# Patient Record
Sex: MALE | Race: WHITE | URBAN - METROPOLITAN AREA
[De-identification: names, ages, dates, MRNs, and addresses within clinical notes are randomized per-mention and may not be internally consistent; named-entity substitution may affect disease eponyms.]

---

## 2017-09-28 ENCOUNTER — TRANSFERRED RECORDS (OUTPATIENT)
Dept: HEALTH INFORMATION MANAGEMENT | Facility: CLINIC | Age: 82
End: 2017-09-28

## 2018-01-29 ENCOUNTER — TRANSFERRED RECORDS (OUTPATIENT)
Dept: HEALTH INFORMATION MANAGEMENT | Facility: CLINIC | Age: 83
End: 2018-01-29

## 2018-02-02 ENCOUNTER — TELEPHONE (OUTPATIENT)
Dept: SURGERY | Facility: CLINIC | Age: 83
End: 2018-02-02

## 2018-02-02 NOTE — TELEPHONE ENCOUNTER
Received referral from [facility] for newly diagnosed colon cancer.  Called and spoke with patient's daughter (per referral request).  Patient is scheduled 2/9/18 at 4:30 pm.  Provided address and check-in location.  [place] [facility].  Contacted our film room to link images, and left a message.

## 2018-02-09 ENCOUNTER — OFFICE VISIT (OUTPATIENT)
Dept: SURGERY | Facility: CLINIC | Age: 83
End: 2018-02-09
Payer: MEDICARE

## 2018-02-09 VITALS
WEIGHT: 199.4 LBS | DIASTOLIC BLOOD PRESSURE: 46 MMHG | BODY MASS INDEX: 31.3 KG/M2 | HEART RATE: 65 BPM | TEMPERATURE: 98.6 F | OXYGEN SATURATION: 96 % | HEIGHT: 67 IN | SYSTOLIC BLOOD PRESSURE: 114 MMHG

## 2018-02-09 DIAGNOSIS — I73.9 PVD (PERIPHERAL VASCULAR DISEASE) (H): ICD-10-CM

## 2018-02-09 DIAGNOSIS — C18.2 MALIGNANT NEOPLASM OF ASCENDING COLON (H): Primary | ICD-10-CM

## 2018-02-09 RX ORDER — CITALOPRAM HYDROBROMIDE 40 MG/1
40 TABLET ORAL EVERY MORNING
COMMUNITY
Start: 2016-07-13

## 2018-02-09 RX ORDER — SIMVASTATIN 20 MG
20 TABLET ORAL AT BEDTIME
COMMUNITY
Start: 2016-07-27

## 2018-02-09 RX ORDER — LISINOPRIL AND HYDROCHLOROTHIAZIDE 12.5; 2 MG/1; MG/1
TABLET ORAL
Status: ON HOLD | COMMUNITY
Start: 2015-09-09 | End: 2018-03-30

## 2018-02-09 RX ORDER — ASPIRIN 325 MG
325 TABLET ORAL EVERY MORNING
Status: ON HOLD | COMMUNITY
Start: 2017-04-10 | End: 2018-03-23

## 2018-02-09 RX ORDER — GABAPENTIN 300 MG/1
300 CAPSULE ORAL EVERY MORNING
COMMUNITY
Start: 2016-08-12

## 2018-02-09 ASSESSMENT — ENCOUNTER SYMPTOMS
BRUISES/BLEEDS EASILY: 0
BOWEL INCONTINENCE: 0
ABDOMINAL PAIN: 0
BLOATING: 0
NAUSEA: 0
RECTAL PAIN: 0
DIARRHEA: 0
EXERCISE INTOLERANCE: 1
CONSTIPATION: 0
PALPITATIONS: 0
VOMITING: 0
HYPOTENSION: 0
LIGHT-HEADEDNESS: 1
SWOLLEN GLANDS: 0
BLOOD IN STOOL: 0
HYPERTENSION: 0
SLEEP DISTURBANCES DUE TO BREATHING: 0
LEG PAIN: 1
SYNCOPE: 0
HEARTBURN: 0
JAUNDICE: 0
ORTHOPNEA: 0

## 2018-02-09 ASSESSMENT — PAIN SCALES - GENERAL: PAINLEVEL: NO PAIN (0)

## 2018-02-09 NOTE — MR AVS SNAPSHOT
After Visit Summary   2/9/2018    Pranav Jackson    MRN: [id number]           Patient Information     Date Of Birth          11/28/1934        Visit Information        Provider Department      2/9/2018 4:30 PM Brandin Ibarra MD [facility] Colon and Rectal Surgery        Today's Diagnoses     Malignant neoplasm of ascending colon (H)    -  1    PVD (peripheral vascular disease) (H)           Follow-ups after your visit        Additional Services     PAC Visit Referral (For [facility] Only)                 Your next 10 appointments already scheduled     Mar 07, 2018 11:40 AM CST   (Arrive by 11:25 AM)   CT CHEST W/O & W CONTRAST with UCCT2   [facility] Imaging Center CT ([facility])    [address]  [facility] [address] [phone]           Please bring any scans or X-rays taken at other hospitals, if similar tests were done. Also bring a list of your medicines, including vitamins, minerals and over-the-counter drugs. It is safest to leave personal items at home.  Be sure to tell your doctor:   If you have any allergies.   If there s any chance you are pregnant.   If you are breastfeeding.    If you have diabetes as your medication may need to be adjusted for this exam.  You will have contrast for this exam. To prepare:   Do not eat or drink for 2 hours before your exam. If you need to take medicine, you may take it with small sips of water. (We may ask you to take liquid medicine as well.)   The day before your exam, drink extra fluids at least six 8-ounce glasses (unless your doctor tells you to restrict your fluids).  Patients over 70 or patients with diabetes or kidney problems:   If you haven t had a blood test (creatinine test) within the last 30 days, the Cardiologist/Radiologist may require you to get this test prior to your exam.  Please wear loose clothing, such as a sweat suit or jogging clothes. Avoid snaps, zippers and other metal. We may ask you to  undress and put on a hospital gown.  If you have any questions, please call the Imaging Department where you will have your exam.            Mar 07, 2018  1:00 PM CST   (Arrive by 12:45 PM)   PAC EVALUATION with Torrey Pac Lilia 1   [facility] Preoperative Assessment [place] ([facility])    [address] [place] [id number]   [phone]            Mar 07, 2018  2:00 PM CST   (Arrive by 1:45 PM)   PAC RN ASSESSMENT with Torrey Pac Rn   [facility] Preoperative Assessment [place] ([facility])    [address] [place] [id number]   [phone]            Mar 07, 2018  2:30 PM CST   (Arrive by 2:15 PM)   PAC Anesthesia Consult with Torrey Pac Anesthesiologist   [facility] Assessment [place] ([facility])    [address] [place] [address]   [phone]              Future tests that were ordered for you today     Open Future Orders        Priority Expected Expires Ordered    CT Chest w & w/o contrast Routine  2/9/2019 2/9/2018    US Carotid Bilateral Routine  2/9/2019 2/9/2018            Who to contact     Please call your clinic at [phone] to:    Ask questions about your health    Make or cancel appointments    Discuss your medicines    Learn about your test results    Speak to your doctor            Additional Information About Your Visit        MyChart Information     [employer] is an electronic gateway that provides easy, online access to your medical records. With [email or web address], you can request a clinic appointment, read your test results, renew a prescription or communicate with your care team.     To sign up for [employer] visit the website at [email or web address]   You will be asked to enter the access code listed below, as well as some personal information. Please follow the directions to create your username and password.     Your access code is: [email or web address]  Expires:  "2018  6:30 AM     Your access code will  in 90 days. If you need help or a new code, please contact your [facility] or call [phone] for assistance.        Care EveryWhere ID     This is your Care EveryWhere ID. This could be used by other organizations to access your [place] medical records  [id number]        Your Vitals Were     Pulse Temperature Height Pulse Oximetry BMI (Body Mass Index)       65 98.6  F (37  C) (Oral) 5' 7\" 96% 31.23 kg/m2        Blood Pressure from Last 3 Encounters:   18 114/46    Weight from Last 3 Encounters:   18 199 lb 6.4 oz              We Performed the Following     PAC Visit Referral (For [facility] Only)     Melony-Operative Worksheet        Primary Care Provider Office Phone # Fax #    Beto WILKS Josiah,  [phone]       [facility] [address]   [facility] [address]        Equal Access to Services     SAGE ROBLEDO : Hadii aad ku hadasho Soomaali, waaxda luqadaha, qaybta kaalmada adeegyada, waxay idiin hayaan adeeg kharash la'aan ah. So [facility] [phone].    ATENCIÓN: Si habla español, tiene a stoner disposición servicios gratuitos de asistencia lingüística. Kathy al [phone].    We comply with applicable federal civil rights laws and [place] laws. We do not discriminate on the basis of race, color, national origin, age, disability, sex, sexual orientation, or gender identity.            Thank you!     Thank you for choosing [facility] COLON AND RECTAL SURGERY  for your care. Our goal is always to provide you with excellent care. Hearing back from our patients is one way we can continue to improve our services. Please take a few minutes to complete the written survey that you may receive in the mail after your visit with us. Thank you!             Your Updated Medication List - Protect others around you: Learn how to safely use, store and throw away your medicines at www.disposemymeds.org.        "   This list is accurate as of 2/9/18  6:03 PM.  Always use your most recent med list.                   Brand Name Dispense Instructions for use Diagnosis    aspirin 325 MG tablet      Take 325 mg by mouth        citalopram 40 MG tablet    celeXA     Take 40 mg by mouth        gabapentin 300 MG capsule    NEURONTIN     Take 300 mg by mouth        IRON SUPPLEMENT PO           lisinopril-hydrochlorothiazide 20-12.5 MG per tablet    PRINZIDE/ZESTORETIC     TAKE ONE TABLET BY MOUTH ONCE DAILY        metFORMIN 1000 MG tablet    GLUCOPHAGE     Take 1,000 mg by mouth        MULTIVITAMIN ADULT PO           omeprazole 20 MG CR capsule    priLOSEC     Take 20 mg by mouth        simvastatin 20 MG tablet    ZOCOR     Take 20 mg by mouth        [facility]

## 2018-02-09 NOTE — LETTER
"2018       RE: Pranav Jackson  [address]    [place] [address]     Dear Colleague,    Thank you for referring your patient, Pranav Jackson, to the [facility] COLON AND RECTAL SURGERY at [facility]. Please see a copy of my visit note below.    Colon and Rectal Surgery Clinic Note      RE: Pranav Jackson  : 1934  SANDRA: 2018    Pranav Jackson is a very pleasant 83-year-old male who presents today for ascending colon cancer.    Pranav presents today with his daughter Sherri, son Ozzy, and son-in-law Dale.  He underwent a colonoscopy on 2018 for anemia and black stools.  He was found to have a tumor in the ascending colon that covered at least 1/4-1/3 of the circumference of the bowel.  This was tattooed.  Biopsy showed invasive colorectal adenocarcinoma, moderately differentiated, with focal mucin production.  CT abdomen pelvis with no evidence of metastatic disease.  CEA 0.9.  Hemoglobin 10.4.  Pranav reports that he has lost 12-14 pounds recently but also changed his diet as his diabetes was poorly managed.  He denies any nausea or vomiting.  He denies any abdominal pain.  He has been having black stools.  He has reportedly had polyps on colonoscopies in the past.  He denies any family history of any colon cancer.    PMH: Diabetic on trulicity.  HTN. ASPVD. He reportedly was told that his aorta is \"like a rock\" and he was unable to have an abdominal bypass so he underwent a femoral to femoral bypass.  The patient recently underwent evaluation for orthopedic surgery and had an exercise stress test within the last 3 months.  This apparently was normal.  He also is known to have some degree of carotid stenosis but he had a carotid ultrasound 3 months ago that did not preclude surgery    PSH: Orthopedic surgeries and femoral bypass.  No prior history of anesthesia complications.    Social: Pranav lives independently in an apartment.  He quit smoking 15 years ago.  " He is a retired .  Moderate alcohol use.     PHYSICAL EXAM: Examination was chaperoned by Yakelin Cisneros RN.  The abdomen is soft.  There is a well-healed midline laparotomy scar.  There are no palpable masses.  The liver is not enlarged.  There is minimal tenderness in the left lower quadrant.    IMAGING: I personally reviewed the patient's abdominal pelvis CT.  I cannot clearly delineate the mass, described in the report at the hepatic flexure.  There appears to be bowel wall thickening and mild stranding in the left lower quadrant consistent with diverticulitis.  The patient has not been acutely ill with diverticulitis, but he allows he has had some left lower quadrant discomfort around the time of his colonoscopy.    PLAN: We will obtain a chest CT to complete the metastatic workup. Will obtain cardiac stress test and carotid ultrasound from [place], [place].  The patient will need to be seen in the preanesthesia clinic prior to surgery.  We offered the patient a surgery date in about 10 days, but due to family considerations he would like to put the surgery off for an additional month.  I advised that there was probably some small risk of tumor spread during that interval but it would be reasonable to proceed according to their wishes.  We discussed the medical and surgical risks associated with colectomy.  I plan a laparoscopic approach.  Our discussion included but was not limited to bleeding, infection, DVT/PE, pneumonia, MI/CVA, anastomotic failure and its potential consequences, and death.  We discussed his increased risk of cardiovascular morbidity based on his significant peripheral vascular disease.  I answered all of Pranav and his family's questions to their stated satisfaction.  They expressed her understanding and agreement with the proposed plan.    Total time 45 minutes.  Greater than half the time was spent counseling.    For details of past medical history, surgical history, family  "history, medications, allergies, and review of systems, please see details below.    Medical history:  No past medical history on file.    Surgical history:  No past surgical history on file.    Family history:  No family history on file.    Medications:  Current Outpatient Prescriptions   Medication Sig Dispense Refill     aspirin 325 MG tablet Take 325 mg by mouth       Multiple Vitamins-Minerals (MULTIVITAMIN ADULT PO)        gabapentin (NEURONTIN) 300 MG capsule Take 300 mg by mouth       omeprazole (PRILOSEC) 20 MG CR capsule Take 20 mg by mouth       lisinopril-hydrochlorothiazide (PRINZIDE/ZESTORETIC) 20-12.5 MG per tablet TAKE ONE TABLET BY MOUTH ONCE DAILY       metFORMIN (GLUCOPHAGE) 1000 MG tablet Take 1,000 mg by mouth       simvastatin (ZOCOR) 20 MG tablet Take 20 mg by mouth       citalopram (CELEXA) 40 MG tablet Take 40 mg by mouth       Ferrous Sulfate (IRON SUPPLEMENT PO)        Dulaglutide (TRULICITY SC)        Allergies:  The patientis allergic to penicillins.    Social history:  Social History   Substance Use Topics     Smoking status: Former Smoker     Smokeless tobacco: Never Used     Alcohol use Yes     Marital status: .    Review of Systems:  Nursing Notes:   Sanjana Marcus LPN  2/9/2018  4:01 PM  Signed  Chief Complaint   Patient presents with     Clinic Care Coordination - Initial     New pt consult, colon ca.        Vitals:    02/09/18 1553   BP: 114/46   BP Location: Left arm   Patient Position: Chair   Cuff Size: Adult Large   Pulse: 65   Temp: 98.6  F (37  C)   TempSrc: Oral   SpO2: 96%   Weight: 199 lb 6.4 oz   Height: 5' 7\"       Body mass index is 31.23 kg/(m^2).    Jordi HUITRON LPN     Referring Provider:  Beto Rahman DO  [facility]  [address] [place] [address]     Primary Care Provider:  Beto Rahman    Again, thank you for allowing me to participate in the care of your patient.      Sincerely,    Brandin Ibarra MD      "

## 2018-02-09 NOTE — PROGRESS NOTES
"Colon and Rectal Surgery Clinic Note      RE: Pranav Jackson  : 1934  SANDRA: 2018    Pranav Jackson is a very pleasant 83-year-old male who presents today for ascending colon cancer.    Pranav presents today with his daughter Sherri, son Ozzy, and son-in-law Dale.  He underwent a colonoscopy on 2018 for anemia and black stools.  He was found to have a tumor in the ascending colon that covered at least 1/4-1/3 of the circumference of the bowel.  This was tattooed.  Biopsy showed invasive colorectal adenocarcinoma, moderately differentiated, with focal mucin production.  CT abdomen pelvis with no evidence of metastatic disease.  CEA 0.9.  Hemoglobin 10.4.  Pranav reports that he has lost 12-14 pounds recently but also changed his diet as his diabetes was poorly managed.  He denies any nausea or vomiting.  He denies any abdominal pain.  He has been having black stools.  He has reportedly had polyps on colonoscopies in the past.  He denies any family history of any colon cancer.    PMH: Diabetic on trulicity.  HTN. ASPVD. He reportedly was told that his aorta is \"like a rock\" and he was unable to have an abdominal bypass so he underwent a femoral to femoral bypass.  The patient recently underwent evaluation for orthopedic surgery and had an exercise stress test within the last 3 months.  This apparently was normal.  He also is known to have some degree of carotid stenosis but he had a carotid ultrasound 3 months ago that did not preclude surgery    PSH: Orthopedic surgeries and femoral bypass.  No prior history of anesthesia complications.    Social: Pranav lives independently in an apartment.  He quit smoking 15 years ago.  He is a retired .  Moderate alcohol use.     PHYSICAL EXAM: Examination was chaperoned by Yakelin Cisneros RN.  The abdomen is soft.  There is a well-healed midline laparotomy scar.  There are no palpable masses.  The liver is not enlarged.  There is minimal tenderness in the " left lower quadrant.    IMAGING: I personally reviewed the patient's abdominal pelvis CT.  I cannot clearly delineate the mass, described in the report at the hepatic flexure.  There appears to be bowel wall thickening and mild stranding in the left lower quadrant consistent with diverticulitis.  The patient has not been acutely ill with diverticulitis, but he allows he has had some left lower quadrant discomfort around the time of his colonoscopy.    PLAN: We will obtain a chest CT to complete the metastatic workup. Will obtain cardiac stress test and carotid ultrasound from [place], [place].  The patient will need to be seen in the preanesthesia clinic prior to surgery.  We offered the patient a surgery date in about 10 days, but due to family considerations he would like to put the surgery off for an additional month.  I advised that there was probably some small risk of tumor spread during that interval but it would be reasonable to proceed according to their wishes.  We discussed the medical and surgical risks associated with colectomy.  I plan a laparoscopic approach.  Our discussion included but was not limited to bleeding, infection, DVT/PE, pneumonia, MI/CVA, anastomotic failure and its potential consequences, and death.  We discussed his increased risk of cardiovascular morbidity based on his significant peripheral vascular disease.  I answered all of Pranav and his family's questions to their stated satisfaction.  They expressed her understanding and agreement with the proposed plan.    Total time 45 minutes.  Greater than half the time was spent counseling.    For details of past medical history, surgical history, family history, medications, allergies, and review of systems, please see details below.    Medical history:  No past medical history on file.    Surgical history:  No past surgical history on file.    Family history:  No family history on file.    Medications:  Current Outpatient Prescriptions  "  Medication Sig Dispense Refill     aspirin 325 MG tablet Take 325 mg by mouth       Multiple Vitamins-Minerals (MULTIVITAMIN ADULT PO)        gabapentin (NEURONTIN) 300 MG capsule Take 300 mg by mouth       omeprazole (PRILOSEC) 20 MG CR capsule Take 20 mg by mouth       lisinopril-hydrochlorothiazide (PRINZIDE/ZESTORETIC) 20-12.5 MG per tablet TAKE ONE TABLET BY MOUTH ONCE DAILY       metFORMIN (GLUCOPHAGE) 1000 MG tablet Take 1,000 mg by mouth       simvastatin (ZOCOR) 20 MG tablet Take 20 mg by mouth       citalopram (CELEXA) 40 MG tablet Take 40 mg by mouth       Ferrous Sulfate (IRON SUPPLEMENT PO)        Dulaglutide (TRULICITY SC)        Allergies:  The patientis allergic to penicillins.    Social history:  Social History   Substance Use Topics     Smoking status: Former Smoker     Smokeless tobacco: Never Used     Alcohol use Yes     Marital status: .    Review of Systems:  Nursing Notes:   Sanjana Marcus LPN  2/9/2018  4:01 PM  Signed  Chief Complaint   Patient presents with     Clinic Care Coordination - Initial     New pt consult, colon ca.        Vitals:    02/09/18 1553   BP: 114/46   BP Location: Left arm   Patient Position: Chair   Cuff Size: Adult Large   Pulse: 65   Temp: 98.6  F (37  C)   TempSrc: Oral   SpO2: 96%   Weight: 199 lb 6.4 oz   Height: 5' 7\"       Body mass index is 31.23 kg/(m^2).    JULIÁN Pierce MD   Professor and Chief  Division of Colon and Rectal Surgery  [facility] [place]      Referring Provider:  Beto Rahman [facility]  [address] [place] [address]     Primary Care Provider:  Beto Rahman  "

## 2018-02-20 ENCOUNTER — TELEPHONE (OUTPATIENT)
Dept: SURGERY | Facility: CLINIC | Age: 83
End: 2018-02-20

## 2018-02-20 DIAGNOSIS — C18.9 COLON CANCER (H): Primary | ICD-10-CM

## 2018-02-20 NOTE — TELEPHONE ENCOUNTER
Called patient daughter and left message stating that she needed to call this RN and to discuss fathers plan of care. Direct number was left for patient's daughter ness.

## 2018-02-21 ENCOUNTER — TELEPHONE (OUTPATIENT)
Dept: SURGERY | Facility: CLINIC | Age: 83
End: 2018-02-21

## 2018-02-21 NOTE — TELEPHONE ENCOUNTER
Sherri, the patients daughter returned this RN phone call today. This RN explained to the daughter that the carotid  artery studies that were obtained from [facility] showed blockage in his carotid arteries. She was informed that before Dr. Ibarra performs surgery on the patient he would like him to have an office visit with the vascular surgeon Dr. Walters to determine if it  is safe for him to undergo an abdominal surgery. Daughter verbalized understanding and will await for Renita Beauchamp to call her with a date and time of an office visit. The daughter Sherri, gave other contact numbers to be reached during the day, these will be listed below.    Sherri:  [phone]  Cell [phone]

## 2018-02-27 ENCOUNTER — TELEPHONE (OUTPATIENT)
Dept: SURGERY | Facility: CLINIC | Age: 83
End: 2018-02-27

## 2018-02-27 NOTE — TELEPHONE ENCOUNTER
Patient is scheduled to see Dr. Walters this Thursday 3/1/18 at 1:15 pm.  Called and spoke with patient's daughter Sherri.  Sherri confirms appointment and check-in location.  Offered to reschedule PAC and CT for same day.  Sherri kindly declined as there would be a larger wait time between PAC and other appointments.  Sherri has my direct number for additional questions or concerns.

## 2018-03-01 ENCOUNTER — RADIANT APPOINTMENT (OUTPATIENT)
Dept: ULTRASOUND IMAGING | Facility: CLINIC | Age: 83
End: 2018-03-01
Attending: COLON & RECTAL SURGERY
Payer: MEDICARE

## 2018-03-01 ENCOUNTER — RADIANT APPOINTMENT (OUTPATIENT)
Dept: CT IMAGING | Facility: CLINIC | Age: 83
End: 2018-03-01
Attending: SURGERY
Payer: MEDICARE

## 2018-03-01 ENCOUNTER — OFFICE VISIT (OUTPATIENT)
Dept: VASCULAR SURGERY | Facility: CLINIC | Age: 83
End: 2018-03-01
Payer: MEDICARE

## 2018-03-01 ENCOUNTER — CARE COORDINATION (OUTPATIENT)
Dept: VASCULAR SURGERY | Facility: CLINIC | Age: 83
End: 2018-03-01

## 2018-03-01 VITALS
HEART RATE: 57 BPM | SYSTOLIC BLOOD PRESSURE: 122 MMHG | RESPIRATION RATE: 16 BRPM | DIASTOLIC BLOOD PRESSURE: 58 MMHG | OXYGEN SATURATION: 97 %

## 2018-03-01 DIAGNOSIS — I65.23 CAROTID STENOSIS, ASYMPTOMATIC, BILATERAL: ICD-10-CM

## 2018-03-01 DIAGNOSIS — I65.23 CAROTID STENOSIS, ASYMPTOMATIC, BILATERAL: Primary | ICD-10-CM

## 2018-03-01 DIAGNOSIS — I65.23 BILATERAL CAROTID ARTERY STENOSIS WITHOUT CEREBRAL INFARCTION: Primary | ICD-10-CM

## 2018-03-01 DIAGNOSIS — C18.2 MALIGNANT NEOPLASM OF ASCENDING COLON (H): ICD-10-CM

## 2018-03-01 LAB
CREAT BLD-MCNC: 1.1 MG/DL (ref 0.66–1.25)
GFR SERPL CREATININE-BSD FRML MDRD: 64 ML/MIN/1.7M2

## 2018-03-01 RX ORDER — IOPAMIDOL 755 MG/ML
75 INJECTION, SOLUTION INTRAVASCULAR ONCE
Status: COMPLETED | OUTPATIENT
Start: 2018-03-01 | End: 2018-03-01

## 2018-03-01 RX ADMIN — IOPAMIDOL 75 ML: 755 INJECTION, SOLUTION INTRAVASCULAR at 12:41

## 2018-03-01 ASSESSMENT — PAIN SCALES - GENERAL: PAINLEVEL: NO PAIN (0)

## 2018-03-01 NOTE — MR AVS SNAPSHOT
After Visit Summary   3/1/2018    Pranav Jackson    MRN: [id number]           Patient Information     Date Of Birth          11/28/1934        Visit Information        Provider Department      3/1/2018 1:15 PM Allyn Walters MD [facility]        Today's Diagnoses     Bilateral carotid artery stenosis without cerebral infarction    -  1       Follow-ups after your visit        Follow-up notes from your care team     Return in about 3 months (around 6/1/2018).      Your next 10 appointments already scheduled     Mar 07, 2018 11:40 AM CST   (Arrive by 11:25 AM)   CT CHEST W/O & W CONTRAST with UCCT2   [facility] [place] [facility] ([facility])    [address]  1st Floor  [facility] [address] [phone]           Please bring any scans or X-rays taken at other hospitals, if similar tests were done. Also bring a list of your medicines, including vitamins, minerals and over-the-counter drugs. It is safest to leave personal items at home.  Be sure to tell your doctor:   If you have any allergies.   If there s any chance you are pregnant.   If you are breastfeeding.    If you have diabetes as your medication may need to be adjusted for this exam.  You will have contrast for this exam. To prepare:   Do not eat or drink for 2 hours before your exam. If you need to take medicine, you may take it with small sips of water. (We may ask you to take liquid medicine as well.)   The day before your exam, drink extra fluids at least six 8-ounce glasses (unless your doctor tells you to restrict your fluids).  Patients over 70 or patients with diabetes or kidney problems:   If you haven t had a blood test (creatinine test) within the last 30 days, the Cardiologist/Radiologist may require you to get this test prior to your exam.  Please wear loose clothing, such as a sweat suit or jogging clothes. Avoid snaps, zippers and other metal. We may ask you to undress and put on a  [facility].  If you have any questions, please call the Imaging Department where you will have your exam.            Mar 07, 2018  1:00 PM CST   (Arrive by 12:45 PM)   PAC EVALUATION with  Pac Lilia 1   [facility] ([facility])    [address] [place] [address]  [address] [facility] [address] [phone]            Mar 07, 2018  2:00 PM CST   (Arrive by 1:45 PM)   PAC RN ASSESSMENT with  Pac Rn   [facility] ([facility])    [address] [place] [address]  4th [facility] [id number] [phone]            Mar 07, 2018  2:30 PM CST   (Arrive by 2:15 PM)   PAC Anesthesia Consult with  Pac Anesthesiologist   [facility] [place] ([facility])    [address] [place] [address]  4th [facility] [id number] [phone]            Mar 21, 2018   Procedure with Brandin Ibarra MD   [facility] [place], Same Day Surgery (--)    [address] [address] [phone]            Jun 07, 2018 12:30 PM CDT   US CAROTID BILATERAL with UCUSV2   [facility] ([facility])    [address] [place] [address]  [address]st [facility] [id number] [phone]           Please bring a list of your medicines (including vitamins, minerals and over-the-counter drugs). Also, tell your doctor about any allergies you may have. Wear comfortable clothes and leave your valuables at home.  You do not need to do anything special to prepare for your exam.  Please call the Imaging Department at your exam site with any questions.            Jun 07, 2018  1:45 PM CDT   (Arrive by 1:30 PM)   Return Vascular Visit with Allyn Walters MD   [facility] ([facility])    [address] [place] [address]  3rd [facility] [id number] [phone]              Who to contact     Please call your clinic at [phone] to:    Ask  questions about your health    Make or cancel appointments    Discuss your medicines    Learn about your test results    Speak to your doctor            Additional Information About Your Visit        MyChart Information     MedPassage is an electronic gateway that provides easy, online access to your medical records. With MedPassage, you can request a clinic appointment, read your test results, renew a prescription or communicate with your care team.     To sign up for MedPassage visit the website at [email or web address]   You will be asked to enter the access code listed below, as well as some personal information. Please follow the directions to create your username and password.     Your access code is: [email or web address]  Expires: 2018  6:30 AM     Your access code will  in 90 days. If you need help or a new code, please contact your [facility] or call [phone] for assistance.        Care EveryWhere ID     This is your Care EveryWhere ID. This could be used by other organizations to access your [facility] medical records  [id number]        Your Vitals Were     Pulse Respirations Pulse Oximetry             57 16 97%          Blood Pressure from Last 3 Encounters:   18 122/58   18 114/46    Weight from Last 3 Encounters:   18 199 lb 6.4 oz               Primary Care Provider Office Phone # Fax #    Beto WILKS DO Josiah [phone]       [facility] [place] [address]   [facility] [address]        Equal Access to Services     SAGE ROBLEDO AH: Hadii aad ku hadasho Soomaali, waaxda luqadaha, qaybta kaalmada adeegyada, waxay alma hayac butler la'ac cohen. So [facility] [phone].    ATENCIÓN: Si habla español, tiene a stoner disposición servicios gratuitos de asistencia lingüística. Llame al [phone].    We comply with applicable federal civil rights laws and [place] laws. We do not discriminate on the basis of race, color, national  origin, age, disability, sex, sexual orientation, or gender identity.            Thank you!     Thank you for choosing [facility]  for your care. Our goal is always to provide you with excellent care. Hearing back from our patients is one way we can continue to improve our services. Please take a few minutes to complete the written survey that you may receive in the mail after your visit with us. Thank you!             Your Updated Medication List - Protect others around you: Learn how to safely use, store and throw away your medicines at www.disposemymeds.org.          This list is accurate as of 3/1/18 11:59 PM.  Always use your most recent med list.                   Brand Name Dispense Instructions for use Diagnosis    aspirin 325 MG tablet      Take 325 mg by mouth        citalopram 40 MG tablet    celeXA     Take 40 mg by mouth        gabapentin 300 MG capsule    NEURONTIN     Take 300 mg by mouth        IRON SUPPLEMENT PO           lisinopril-hydrochlorothiazide 20-12.5 MG per tablet    PRINZIDE/ZESTORETIC     TAKE ONE TABLET BY MOUTH ONCE DAILY        metFORMIN 1000 MG tablet    GLUCOPHAGE     Take 1,000 mg by mouth        MULTIVITAMIN ADULT PO           omeprazole 20 MG CR capsule    priLOSEC     Take 20 mg by mouth        simvastatin 20 MG tablet    ZOCOR     Take 20 mg by mouth        [facility]

## 2018-03-01 NOTE — PROGRESS NOTES
Mr Manuel referred to Dr Walters for evaluation of carotid artery stenosis pre surgery.    Mr Manuel to have a CT scan prior to appointment since there is no recent imaging available.  Spoke with daughter Sherri to confirm plan.

## 2018-03-01 NOTE — NURSING NOTE
Chief Complaint   Patient presents with     Consult     Consult carotid stenosis       Vitals:    03/01/18 1339   BP: 122/58   BP Location: Right arm   Pulse: 57   Resp: 16   SpO2: 97%       There is no height or weight on file to calculate BMI.                 Juana Wilde LPN

## 2018-03-01 NOTE — LETTER
"3/1/2018       RE: Pranav Jackson  [address]   [facility] [address]     Dear Colleague,    Thank you for referring your patient, Pranav Jackson, to the [facility] at [facility]. Please see a copy of my visit note below.    VASCULAR SURGERY CLINIC CONSULTATION   Vascular surgeon: Allyn Walters MD  Date of Service: 3/1/2018    Pranav Jackson   Medical Record #:  [id number]  YOB: 1934  Age:  83 year old   PCP: Beto Rahman    Reason for visit:  Carotid artery stenosis    HPI:  Pranav Jackson is a 83 year old year old male who has a PMH significant for Diabetic on trulicity.  HTN. ASPVD. Pt was referred from Dr. Ibarra of colorectal surgery for evaluation of carotid artery stenosis. Pt is asymptomatic and has never had a stroke, TIA, limb or facial droop, monocular vision changes, or loss of speech. Pt does have a history of vascular intervention in [place] by Dr. Mitchell (sp?) around 16 years ago. He was supposed to have what sounds like and aorto-bifem bypass, but  was told that his aorta is \"like a rock\" and he was unable to have an abdominal bypass so he underwent a femoral to femoral bypass. Pt feels his bypass is still intact. He is able to move well without problems. Pt has not noted any LE edema, redness, or problems. As noted below, pt's family history is significant for mother having strokes. The patient recently underwent evaluation for orthopedic surgery and had an exercise stress test within the last 3 months.  This apparently was normal.     Review of Systems:  General: Denies F/C  Respiratory: Denies SOB  Cardiovascular: Denies CP  Gastrointestinal: Denies N/V  Musculoskeletal: Denies LE pain  : denies changes to bladder habits  Neurologic: Denies HA  Psychiatric: Denies confusion  Skin: no concerning lesions  Hematology/immunology: no unexpected bruising    Past medical History:  Diabetic on trulicity.  HTN. ASPVD    Past " Surgical History:   Orthopedic surgeries and femoral bypass.  No prior history of anesthesia complications.    Family History:  No siblings. Mother  of cancer and strokes. Dad  of cancer    SOCIAL HISTORY:   Social History   Substance Use Topics     Smoking status: Former Smoker     Smokeless tobacco: Never Used     Alcohol use Yes     Home medications:  Prior to Admission medications    Medication Sig Start Date End Date Taking? Authorizing Provider   aspirin 325 MG tablet Take 325 mg by mouth 4/10/17   Reported, Patient   Multiple Vitamins-Minerals (MULTIVITAMIN ADULT PO)     Reported, Patient   gabapentin (NEURONTIN) 300 MG capsule Take 300 mg by mouth 16   Reported, Patient   omeprazole (PRILOSEC) 20 MG CR capsule Take 20 mg by mouth 16   Reported, Patient   lisinopril-hydrochlorothiazide (PRINZIDE/ZESTORETIC) 20-12.5 MG per tablet TAKE ONE TABLET BY MOUTH ONCE DAILY 9/9/15   Reported, Patient   metFORMIN (GLUCOPHAGE) 1000 MG tablet Take 1,000 mg by mouth 12/16/15   Reported, Patient   simvastatin (ZOCOR) 20 MG tablet Take 20 mg by mouth 16   Reported, Patient   citalopram (CELEXA) 40 MG tablet Take 40 mg by mouth 16   Reported, Patient   Ferrous Sulfate (IRON SUPPLEMENT PO)     Reported, Patient   Dulaglutide (TRULICITY SC)     Reported, Patient     Vital signs:  /58 (BP Location: Right arm)  Pulse 57  Resp 16  SpO2 97%    0 lbs 0 oz    Physical exam:  Constitutional: healthy, alert, no acute distress and cooperative   Cardiovascular: RRR  Respiratory: breathing unlabored without secondary muscle use  Psychiatric: mentation appears normal and affect normal/bright  Neck: no asymmetry  GI/Abdomen: abdomen soft, non-tender. No palpable masses  MSK: able to move all extremities without weakness or ataxia  Extremities: no open lesions, extremities warm and well perfused  Hematology: no bruising on visible skin  Pulses: right common femoral: 1+ Left common femoral 0+, bilateral  DP: 0+, Bilateral PT: 0+    Imaging:    Right Carotid      Left carotid    I personally reviewed the images and my interpretation is bilateral carotid artery stenosis    Assessment:  Bilateral carotid artery stenosis with plaques on both sides. Plaque on the right is soft, plaque on the left is calcified and irregular.     Plan:  -Continue Aspirin, Simvastatin  -Could consider a carotid endarterectomy, but would likely be best to do electively once pt has fully recovered from his upcoming 3/21/18 colectomy with Dr. Ibarra  -F/U in clinic with Dr. Walters in 3 months to discuss possible procedure with a carotid duplex      TOBACCO CESSATION: Pt quit smoking ~20 years ago      Alice Luevano PA-C   Division of Vascular Surgery  [facility]      I have seen and assessed this patient with the above provider and agree with her above documentation, impression and plan. This patient is a vasculopath with asymptomatic bilateral carotid stenosis and a history of what sounds like a femoral-femoral crossover graft to improve flow to his left leg.  He is sent to me primarily for the asymptomatic carotid disease. This appears to be about 70% on each side.  He also has a very poor [place] and that the posterior communicating arteries appear to be nonexistent on the right and fairly trivial on the left. In theory this means that he has a higher risk of watershed stroke with severe hypotension.  This might be a factor if we were contemplating cardiac bypass on pump, but there is reason to believe that he is importantly at risk of stroke at the time of colectomy.  Overall after colectomy is done surgery to lower stroke risk may be worthwhile, but this assumes a 5 year survival, and so we might wait for two years to look for early cancer recurrence.  Will plan on seeing him back in three months and revisiting this question.  Risk for stroke with colectomy is likely in the 1- 2% range and I anesthesia should make  an effort to avoid hypotension.    I spent>50% of this 45 min visit in counseling.    Again, thank you for allowing me to participate in the care of your patient.      Sincerely,    Allyn Walters MD

## 2018-03-02 NOTE — PROGRESS NOTES
I have seen and assessed this patient with the above provider and agree with her above documentation, impression and plan. This patient is a vasculopath with asymptomatic bilateral carotid stenosis and a history of what sounds like a femoral-femoral crossover graft to improve flow to his left leg.  He is sent to me primarily for the asymptomatic carotid disease. This appears to be about 70% on each side.  He also has a very poor [place] of [place] and that the posterior communicating arteries appear to be nonexistent on the right and fairly trivial on the left. In theory this means that he has a higher risk of watershed stroke with severe hypotension.  This might be a factor if we were contemplating cardiac bypass on pump, but there is reason to believe that he is importantly at risk of stroke at the time of colectomy.  Overall after colectomy is done surgery to lower stroke risk may be worthwhile, but this assumes a 5 year survival, and so we might wait for two years to look for early cancer recurrence.  Will plan on seeing him back in three months and revisiting this question.  Risk for stroke with colectomy is likely in the 1- 2% range and I anesthesia should make an effort to avoid hypotension.    I spent>50% of this 45 min visit in counseling.

## 2018-03-05 LAB — RADIOLOGIST FLAGS: NORMAL

## 2018-03-07 ENCOUNTER — APPOINTMENT (OUTPATIENT)
Dept: SURGERY | Facility: CLINIC | Age: 83
End: 2018-03-07
Payer: MEDICARE

## 2018-03-07 ENCOUNTER — OFFICE VISIT (OUTPATIENT)
Dept: SURGERY | Facility: CLINIC | Age: 83
End: 2018-03-07
Payer: MEDICARE

## 2018-03-07 ENCOUNTER — RADIANT APPOINTMENT (OUTPATIENT)
Dept: CT IMAGING | Facility: CLINIC | Age: 83
End: 2018-03-07
Attending: COLON & RECTAL SURGERY
Payer: MEDICARE

## 2018-03-07 ENCOUNTER — ANESTHESIA EVENT (OUTPATIENT)
Dept: SURGERY | Facility: CLINIC | Age: 83
DRG: 331 | End: 2018-03-07
Payer: MEDICARE

## 2018-03-07 ENCOUNTER — ALLIED HEALTH/NURSE VISIT (OUTPATIENT)
Dept: SURGERY | Facility: CLINIC | Age: 83
End: 2018-03-07
Payer: MEDICARE

## 2018-03-07 VITALS
SYSTOLIC BLOOD PRESSURE: 133 MMHG | WEIGHT: 204.7 LBS | OXYGEN SATURATION: 97 % | RESPIRATION RATE: 18 BRPM | HEIGHT: 67 IN | TEMPERATURE: 97.5 F | BODY MASS INDEX: 32.13 KG/M2 | DIASTOLIC BLOOD PRESSURE: 62 MMHG | HEART RATE: 61 BPM

## 2018-03-07 DIAGNOSIS — C18.9 MALIGNANT NEOPLASM OF COLON, UNSPECIFIED PART OF COLON (H): ICD-10-CM

## 2018-03-07 DIAGNOSIS — C18.2 MALIGNANT NEOPLASM OF ASCENDING COLON (H): ICD-10-CM

## 2018-03-07 DIAGNOSIS — Z01.818 PREOP EXAMINATION: Primary | ICD-10-CM

## 2018-03-07 LAB
ANION GAP SERPL CALCULATED.3IONS-SCNC: 9 MMOL/L (ref 3–14)
BUN SERPL-MCNC: 27 MG/DL (ref 7–30)
CALCIUM SERPL-MCNC: 8.7 MG/DL (ref 8.5–10.1)
CHLORIDE SERPL-SCNC: 104 MMOL/L (ref 94–109)
CO2 SERPL-SCNC: 23 MMOL/L (ref 20–32)
CREAT SERPL-MCNC: 0.86 MG/DL (ref 0.66–1.25)
ERYTHROCYTE [DISTWIDTH] IN BLOOD BY AUTOMATED COUNT: 17.1 % (ref 10–15)
GFR SERPL CREATININE-BSD FRML MDRD: 85 ML/MIN/1.7M2
GLUCOSE SERPL-MCNC: 111 MG/DL (ref 70–99)
HBA1C MFR BLD: 6.8 % (ref 4.3–6)
HCT VFR BLD AUTO: 33.5 % (ref 40–53)
HGB BLD-MCNC: 10.2 G/DL (ref 13.3–17.7)
MCH RBC QN AUTO: 27.7 PG (ref 26.5–33)
MCHC RBC AUTO-ENTMCNC: 30.4 G/DL (ref 31.5–36.5)
MCV RBC AUTO: 91 FL (ref 78–100)
PLATELET # BLD AUTO: 332 10E9/L (ref 150–450)
POTASSIUM SERPL-SCNC: 4.3 MMOL/L (ref 3.4–5.3)
RBC # BLD AUTO: 3.68 10E12/L (ref 4.4–5.9)
SODIUM SERPL-SCNC: 136 MMOL/L (ref 133–144)
WBC # BLD AUTO: 9.6 10E9/L (ref 4–11)

## 2018-03-07 RX ORDER — IOPAMIDOL 755 MG/ML
97 INJECTION, SOLUTION INTRAVASCULAR ONCE
Status: COMPLETED | OUTPATIENT
Start: 2018-03-07 | End: 2018-03-07

## 2018-03-07 RX ADMIN — IOPAMIDOL 97 ML: 755 INJECTION, SOLUTION INTRAVASCULAR at 11:48

## 2018-03-07 ASSESSMENT — LIFESTYLE VARIABLES: TOBACCO_USE: 1

## 2018-03-07 NOTE — ANESTHESIA PREPROCEDURE EVALUATION
Anesthesia Evaluation     . Pt has had prior anesthetic. Type: General and MAC    No history of anesthetic complications          ROS/MED HX    ENT/Pulmonary: Comment: 40 pack year history, quit 20 years ago    (+)tobacco use, Past use , . .    Neurologic:     (+)neuropathy - BLE,     Cardiovascular: Comment: S/p fem-fem bypass    (+) Dyslipidemia, hypertension-range: diastolic typically 40-50s, Peripheral Vascular Disease-- Carotid Stenosis and Other, --. Taking blood thinners Pt has received instructions: Instructions Given to patient: Will switch to ASA 81 mg one week before surgery and remain on until surgically safe. . . :. . Previous cardiac testing date:results:Stress Testdate:9/28/17 results:ECG reviewed date:9/2017 results:NSR, no acute ischemic changes date: results:          METS/Exercise Tolerance: Comment: Walks with walker due to hip and leg pain 1 - Eating, dressing   Hematologic:     (+) Other Hematologic Disorder-Family history of DVT and Factor V Leiden in children (wife had clot history), no issues for patient     (-) History of Transfusion   Musculoskeletal:   (+) , , other musculoskeletal- Left hip pain, needs replacement      GI/Hepatic:     (+) GERD Asymptomatic on medication,       Renal/Genitourinary:     (+) chronic renal disease, type: CRI, Pt does not require dialysis, Pt has no history of transplant,       Endo:     (+) type II DM Last HgA1c: 6.8 date: 3/7/18 Not using insulin - not using insulin pump Normal glucose range: 100-140 Diabetic complications: nephropathy neuropathy, .      Psychiatric:     (+) psychiatric history anxiety      Infectious Disease:  - neg infectious disease ROS       Malignancy:   (+) Malignancy History of Other  Other CA colon cancer Active status post         Other:    (+) No chance of pregnancy C-spine cleared: N/A, H/O Chronic Pain,no other significant disability                    Physical Exam      Airway   Mallampati: II  TM distance: >3 FB  Neck ROM:  limited    Dental   (+) upper dentures and lower dentures    Cardiovascular   Rhythm and rate: regular and normal      Pulmonary    breath sounds clear to auscultation    Other findings: 3/1/18 Carotid US  Impression:     1. Right side:         Degree of stenosis: >/= 70%. Compatible with same-day CTA  findings.     2. Left side:          Degree of stenosis: >/= 70%. Compatible with same-day CTA  findings.     3. A vessel in the region of the right vertebral artery demonstrates  flow with a velocity of 125 cm/s, however, same-day CTA demonstrates  occlusion of the right vertebral artery. The measured vessel may  represent the most proximal segment of the vertebral artery arising  from the subclavian artery, proximal to the occlusion.    CTA Head 3/1/18  Impression:    1. 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  2. Approximately 65-75% stenosis of the proximal left internal carotid  artery and 55-65% stenosis of the proximal right internal carotid  artery.  3. Widely patent major intracranial arteries. No aneurysm or stenosis.  4. No acute intracranial pathology. 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  5. 4 mm right apical pulmonary nodule. Per the Fleischner guidelines,  follow up can be considered in 12 months if the patient is at high  risk for developing lung cancer.     9/28/17 Stress test   Indications:              Pre-op    ECG Stress Conclusions        Regadenoson stress was performed.         No chest pain with exercise.         Resting and Stress ECG reported separately.       Myocardial Perfusion Conclusions        Myocardial perfusion imaging is normal.  There is no scan         evidence of ischemia or infarction.         Left ventricular ejection fraction is 70%.               PAC Discussion and Assessment    ASA Classification: 3  Case is suitable for: [place]  Anesthetic techniques and relevant risks  discussed: GA and GA with regional block for post-op pain control  Invasive monitoring and risk discussed: No  Types:   Possibility and Risk of blood transfusion discussed: Yes  NPO instructions given:   Additional anesthetic preparation and risks discussed:   Needs early admission to pre-op area:   Other:     PAC Resident/NP Anesthesia Assessment:  Pranav Jackson is a 83 year old male scheduled to undergo laparoscopic right hemicolectomy with Dr. Ibarra on 3/21/18. He has the following specific operative considerations:   - RCRI : No serious risk of major adverse cardiac event.   - VTE risk: 3%  - HARRIETT # of risks 4/8 = Intermediate risk  - Risk of PONV score = 2.  If > 2, anti-emetic intervention recommended.    Last procedure for endoscopy. GA in past. No history of problems with anesthesia.     --Adenocarcinoma of colon. Above procedure now planned.   --HLD. Simvastatin. HTN. Will hold Lisinopril-HCTZ on DOS. PVD, s/p carotid artery stenosis and history of fem-fem bypass. Evaluated by Vascular surgery with recommendation for CEA after above procedure.  mg daily and Vascular prefers that he stay on ASA if possible. Recommended that patient switch to 81 mg one week before surgery, and remain on during periop period. After it is surgically safe he should resume his 325 mg. Confirmed with surgery team. Testing above. Good activity tolerance. Consider arterial catheter during surgery to monitor MAP.   --Former smoker. No pulmonary symptoms.    --GERD. Will take Omeprazole on DOS.    --DM II. Last A1C 6.8. Will hold Trulicity and Metformin on DOS. Neuropathy. Will take Gabapentin on DOS.    --Nephropathy. Cr normal today.   --Anxiety. Will take Citalopram on DOS.    --Pain management. Discussed possibility of nerve block if appropriate for patient's procedure. Final counseling and decisions by regional team on DOS.   --Severe DJD left hip. Walks with walker. Will require careful positioning.    --Difficult IV  stick. Patient becomes easily agitated. May require Vascular Access team.   --Enhanced recovery pathway initiated.  Type and screen drawn today.    Discussed with Dr. Holcomb.    Reviewed and Signed by PAC Mid-Level Provider/Resident  Mid-Level Provider/Resident: PARTH Steele CNS  Date: 3/7/18  Time: 12:09am    Attending Anesthesiologist Anesthesia Assessment:  83 year old for lap right hemicolectomy. Patient has no significant cardiac or pulmonary disease; normal stress test. Known PAD, with carotid artery stenosis (occluded right vertebral, 65% right and left ICU) and S/P fem-fem bypass; vascular has recommended CEA, but will be done after this cancer surgery. BP today normal (133/80) so can likely achieve adequate cerebral perfusion with normal MAP; might consider art line as any period of hypotension might put him at risk for stroke.    Patient/case discussed with UMA. No need to see patient. Patient is appropriate for the planned procedure without further work-up or medical management.      Reviewed and Signed by PAC Anesthesiologist  Anesthesiologist: ray  Date: 3/7/2018  Time:   Pass/Fail: Pass  Disposition:     PAC Pharmacist Assessment:        Pharmacist:   Date:   Time:      Anesthesia Plan      History & Physical Review  History and physical reviewed and following examination; no interval change.    ASA Status:  3 .    NPO Status:  > 8 hours    Plan for General and ETT with Intravenous and Propofol induction. Maintenance will be Balanced.    PONV prophylaxis:  Ondansetron (or other 5HT-3)  Additional equipment: 2nd IV and Arterial Line      Postoperative Care  Postoperative pain management:  Multi-modal analgesia.      Consents  Anesthetic plan, risks, benefits and alternatives discussed with:  Patient.  Use of blood products discussed: Yes.   Use of blood products discussed with Patient.  Consented to blood products.  .                          .

## 2018-03-07 NOTE — MR AVS SNAPSHOT
After Visit Summary   3/7/2018    Pranav Jackson    MRN: [id number]           Patient Information     Date Of Birth          1934        Visit Information        Provider Department      3/7/2018 2:00 PM Rn, [facility]        Care Instructions    Preparing for Your Surgery      Name:  Pranav Jackson   MRN:  [id number]   :  1934   Today's Date:  3/7/2018     Arriving for surgery:  Surgery date:  18  Arrival time:  0530 AM  Please come to:       [facility] [address]  [address] [place]  [address]    -  [employer] parking is available in front of the hospital from 5:15 am to 8:00 pm    -  Stop at the Information Desk in the lobby    -   Inform the information person that you are here for surgery. An escort to 3c will be provided. If you would not like an escort, please proceed to 3C on the 3rd floor. [phone]     What can I eat or drink?  -  You may have solid food or milk products until 8 hours prior to your surgery. 1100 PM Tuesday tonio.  -  You may have water, apple juice or 7up/Sprite until 2 hours prior to your surgery. 0530 AM Wednesday.    Which medicines can I take?  -  Do NOT take these medications in the morning, the day of surgery:  HOLD Lisinopril/HCTZ (BP MED) AM OF SURGERY. HOLD METFORMIN AM OF SURGERY, HOLD MULTIVITAMIN AND IRON  AM OF SURGERY.  STOP ASPIRIN 325 mg. 18  -  Please take these medications the day of surgery:  Scheduled meds. START ASPIRIN 81 MG 18 and take up through AM Of Surgery.    How do I prepare myself?  -  Take two showers: one the night before surgery; and one the morning of surgery.         Use Scrubcare or Hibiclens to wash from neck down.  You may use your own shampoo and conditioner. No other hair products.   -  Do NOT use lotion, powder, deodorant, or antiperspirant the day of your surgery.  -  Do NOT wear any makeup, fingernail polish or jewelry.  -   Begin using Incentive Spirometer 1 week prior to surgery.  Use 4 times per day, up to 5 breaths each time.  Bring Incentive Spirometer to hospital.  -Do not bring your own medications to the hospital, except for inhalers and eye drops.  -  Bring your ID and insurance card.    Questions or Concerns:  If you have questions or concerns, please call the  Preoperative Assessment Center (PAC), Monday-Friday 7AM-7PM:  Call [phone], PAC, for questions regarding the day of surgery.  After surgery please call your surgeons office.   AFTER YOUR SURGERY  Breathing exercises   Breathing exercises help you recover faster. Take deep breaths and let the air out slowly. This will:     Help you wake up after surgery.    Help prevent complications like pneumonia.  Preventing complications will help you go home sooner.   We may give you a breathing device (incentive spirometer) to encourage you to breathe deeply.   Nausea and vomiting   You may feel sick to your stomach after surgery; if so, let your nurse know.    Pain control:  After surgery, you may have pain. Our goal is to help you manage your pain. Pain medicine will help you feel comfortable enough to do activities that will help you heal.  These activities may include breathing exercises, walking and physical therapy.   To help your health care team treat your pain we will ask: 1) If you have pain  2) where it is located 3) describe your pain in your words  Methods of pain control include medications given by mouth, vein or by nerve block for some surgeries.  We may give you a pain control pump that will:  1) Deliver the medicine through a tube placed in your vein  2) Control the amount of medicine you receive  3) Allow you to push a button to deliver a dose of pain medicine  Sequential Compression Device (SCD) or Pneumo Boots:  You may need to wear SCD S on your legs or feet. These are wraps connected to a machine that pumps in air and releases it. The repeated pumping  helps prevent blood clots from forming.   Using an Incentive Spirometer    An incentive spirometer is a device that helps you do deep breathing exercises. These exercises expand your lungs, aid in circulation, and help prevent pneumonia. Deep breathing exercises also help you breathe better and improve the function of your lungs by:    Keeping your lungs clear    Strengthening your breathing muscles    Helping prevent respiratory complications or problems  The incentive spirometer gives you a way to take an active part in your care. A nurse or therapist will teach you breathing exercises. To do these exercises, you will breathe in through your mouth and not your nose. The incentive spirometer only works correctly if you breathe in through your mouth.    Steps to clear lungs  Step 1. Exhale normally. Then, inhale normally.    Relax and breathe out.  Step 2. Place your lips tightly around the mouthpiece.    Make sure the device is upright and not tilted.  Step 3. Inhale as much air as you can through the mouthpiece (don't breath through your nose).    Inhale slowly and deeply.    Hold your breath long enough to keep the balls or disk raised for at least 3 to 5 seconds, or as instructed by your healthcare provider.  Step 4. Repeat the exercise regularly.    Begin using the Incentive Spirometer one week prior to your surgery, 4 times per day-5 times each.  Enhanced Recovery After Surgery     This is a team effort, including you, to get you back on your feet, eating and drinking normally and out of the hospital as quickly as possible.  The goals are: 1) NO INFECTIONS and   2) RETURN TO NORMAL DIET    How can we achieve these goals?  1) STAY ACTIVE: Walk every day before your surgery; try to increase the amount every day.  Walk after surgery as much as you can-the nurses will help you.  Walking speeds healing and gets you home quicker, you heal better at home and have less risk of infection.     2) INCENTIVE SPIROMETER:  Practice your incentive spirometer 4 times per day with 5-10 repetitions each time.  Using the incentive spirometer can strengthen your muscles between your ribs and help you have a strong cough after surgery.  A more effective cough can help prevent problems with your lungs.    3) STAY HYDRATED: Drink clear liquids up until 2 hours before your surgery. We would like you to purchase a drink such as Gatorade or Ensure Clear (not the milkshake type).  Drink this before bedtime and on the way into the hospital, drink between 8-12 ounces or until you feel hydrated.  Keeping well hydrated leads to your veins being plump, you wake up faster, and you are less likely to be nauseated. Start drinking water as soon as you can after surgery and advance to clear liquids and food as tolerated.  IV fluids contain salt, drinking fluids will minimize the amount of IV fluids you need and decrease the amount of salt you get.    The most common reason for the patient to be readmitted is dehydration. Staying hydrated after you go home from the hospital is very important.  Ensure or Ensure Clear are good options to keep you hydrated.     4) PAIN MANAGEMENT: If we minimize the amount of opioids and narcotics, and use regional blocks (which numb the area where your surgery is) along with oral pain medications; you will have less side effects of nausea and constipation. Narcotics can slow down your bowels and cause you to stay in the hospital longer.     Our goal is to keep you comfortable; eating and drinking normally and back home safely.                       Follow-ups after your visit        Your next 10 appointments already scheduled     Mar 07, 2018  2:00 PM CST   (Arrive by 1:45 PM)   PAC RN ASSESSMENT with Torrey Pac Rn   [facility] ([facility])    [address] [place] [address]  4th [facility] [id number] [phone]            Mar 07, 2018  2:30 PM CST   (Arrive by 2:15 PM)    PAC Anesthesia Consult with  Pac Anesthesiologist   [facility] ([facility])    [address] [facility]  4th [facility] [id number] [phone]            Mar 07, 2018  2:45 PM CST   LAB with  LAB   [facility] Lab ([facility])    [address] [place] [id number] [phone]           Please do not eat 10-12 hours before your appointment if you are coming in fasting for labs on lipids, cholesterol, or glucose (sugar). This does not apply to pregnant women. Water, hot tea and black coffee (with nothing added) are okay. Do not drink other fluids, diet soda or chew gum.            Mar 21, 2018   Procedure with Brandin Ibarra MD   [facility] [place], Same Day Surgery (--)    [address]  [facility] [place] [address] [phone]            Jun 07, 2018 12:30 PM CDT   US CAROTID BILATERAL with [id number]   [facility] US ([facility])    [address] [place] [id number] [phone]           Please bring a list of your medicines (including vitamins, minerals and over-the-counter drugs). Also, tell your doctor about any allergies you may have. Wear comfortable clothes and leave your valuables at home.  You do not need to do anything special to prepare for your exam.  Please call the Imaging Department at your exam site with any questions.            Jun 07, 2018  1:45 PM CDT   (Arrive by 1:30 PM)   Return Vascular Visit with Allyn Walters MD   [facility] ([facility])    [address] [place] [address]  3rd [facility] [id number] [phone]              Future tests that were ordered for you today     Open Future Orders        Priority Expected Expires Ordered    ABO/Rh type and screen Routine 3/7/2018 4/6/2018 3/7/2018    Basic metabolic panel Routine 3/7/2018 4/6/2018 3/7/2018    CBC with platelets Routine  3/7/2018 2018 3/7/2018    Hemoglobin A1c Routine 3/7/2018 2018 3/7/2018            Who to contact     Please call your clinic at [phone] to:    Ask questions about your health    Make or cancel appointments    Discuss your medicines    Learn about your test results    Speak to your doctor            Additional Information About Your Visit        MyChart Information     [employer] is an electronic gateway that provides easy, online access to your medical records. With [employer], you can request a clinic appointment, read your test results, renew a prescription or communicate with your care team.     To sign up for [employer] visit the website at [email or web address]   You will be asked to enter the access code listed below, as well as some personal information. Please follow the directions to create your username and password.     Your access code is: [email or web address]  Expires: 2018  6:30 AM     Your access code will  in 90 days. If you need help or a new code, please contact your [facility] or call [phone] for assistance.        Care EveryWhere ID     This is your Care EveryWhere ID. This could be used by other organizations to access your [facility] medical records  [id number]         Blood Pressure from Last 3 Encounters:   18 133/62   18 122/58   18 114/46    Weight from Last 3 Encounters:   18 92.9 kg (204 lb 11.2 oz)   18 90.4 kg (199 lb 6.4 oz)              Today, you had the following     No orders found for display       Primary Care Provider Office Phone # Fax #    Beto Rahman [phone]       [facility] [address]   [facility] [address]        Equal Access to Services     SAGE ROBLEDO AH: Hadii aad ku hadasho Soomaali, waaxda luqadaha, qaybta kaalmada adeegyada, waxay alma hayac cohen. So [facility] [phone].    ATENCIÓN: Si habla español, tiene a stoner disposición  servicios gratuitos de asistencia lingüística. Kathy arreola [phone].    We comply with applicable federal civil rights laws and [place] laws. We do not discriminate on the basis of race, color, national origin, age, disability, sex, sexual orientation, or gender identity.            Thank you!     Thank you for choosing [facility]  for your care. Our goal is always to provide you with excellent care. Hearing back from our patients is one way we can continue to improve our services. Please take a few minutes to complete the written survey that you may receive in the mail after your visit with us. Thank you!             Your Updated Medication List - Protect others around you: Learn how to safely use, store and throw away your medicines at www.disposemymeds.org.          This list is accurate as of 3/7/18  1:55 PM.  Always use your most recent med list.                   Brand Name Dispense Instructions for use Diagnosis    ACETAMINOPHEN PO      Take 1,000 mg by mouth 2 times daily        aspirin 325 MG tablet      Take 325 mg by mouth every morning        citalopram 40 MG tablet    celeXA     Take 40 mg by mouth every morning        gabapentin 300 MG capsule    NEURONTIN     Take 300 mg by mouth every morning        IRON SUPPLEMENT PO      Take 65 mg by mouth 2 times daily (with meals)        lisinopril-hydrochlorothiazide 20-12.5 MG per tablet    PRINZIDE/ZESTORETIC     TAKE ONE TABLET BY MOUTH ONCE DAILY IN THE MORNING        metFORMIN 1000 MG tablet    GLUCOPHAGE     Take 1,000 mg by mouth 2 times daily (with meals)        MULTIVITAMIN ADULT PO      Take by mouth every morning        omeprazole 20 MG CR capsule    priLOSEC     Take 20 mg by mouth every morning        simvastatin 20 MG tablet    ZOCOR     Take 20 mg by mouth At Bedtime        TRULICITY SC      Inject Subcutaneous once a week WED MORNING

## 2018-03-07 NOTE — PATIENT INSTRUCTIONS
Preparing for Your Surgery      Name:  Pranav Jackosn   MRN:  [id number]   :  1934   Today's Date:  3/7/2018     Arriving for surgery:  Surgery date:  18  Arrival time:  0530 AM  Please come to:       [facility] [address]  [address] [place]  [address]    -   parking is available in front of the hospital from 5:15 am to 8:00 pm    -  Stop at the Information Desk in the lobby    -   Inform the information person that you are here for surgery. An escort to 3c will be provided. If you would not like an escort, please proceed to 3C on the 3rd floor. [phone]     What can I eat or drink?  -  You may have solid food or milk products until 8 hours prior to your surgery. 1200 PM Monday tonio. (Cl. Liquids starting Tuesday. AM per surgeon instructions.)  -  You may have water, apple juice or 7up/Sprite until 2 hours prior to your surgery. 0530 AM Wednesday.    Which medicines can I take?  -  Do NOT take these medications in the morning, the day of surgery:  HOLD Lisinopril/HCTZ (BP MED) AM OF SURGERY. HOLD METFORMIN AM OF SURGERY, HOLD MULTIVITAMIN AND IRON  AM OF SURGERY.  STOP ASPIRIN 325 mg. 18  -  Please take these medications the day of surgery:  Scheduled meds. START ASPIRIN 81 MG 18 and take up through AM Of Surgery.    How do I prepare myself?  -  Take two showers: one the night before surgery; and one the morning of surgery.         Use Scrubcare or Hibiclens to wash from neck down.  You may use your own shampoo and conditioner. No other hair products.   -  Do NOT use lotion, powder, deodorant, or antiperspirant the day of your surgery.  -  Do NOT wear any makeup, fingernail polish or jewelry.  -  Begin using Incentive Spirometer 1 week prior to surgery.  Use 4 times per day, up to 5 breaths each time.  Bring Incentive Spirometer to hospital.  -Do not bring your own medications to the hospital, except for inhalers and eye drops.  -  Bring  your ID and insurance card.    Questions or Concerns:  If you have questions or concerns, please call the  Preoperative Assessment Center (PAC), Monday-Friday 7AM-7PM:  Call [phone], PAC, for questions regarding the day of surgery.  After surgery please call your surgeons office.   AFTER YOUR SURGERY  Breathing exercises   Breathing exercises help you recover faster. Take deep breaths and let the air out slowly. This will:     Help you wake up after surgery.    Help prevent complications like pneumonia.  Preventing complications will help you go home sooner.   We may give you a breathing device (incentive spirometer) to encourage you to breathe deeply.   Nausea and vomiting   You may feel sick to your stomach after surgery; if so, let your nurse know.    Pain control:  After surgery, you may have pain. Our goal is to help you manage your pain. Pain medicine will help you feel comfortable enough to do activities that will help you heal.  These activities may include breathing exercises, walking and physical therapy.   To help your health care team treat your pain we will ask: 1) If you have pain  2) where it is located 3) describe your pain in your words  Methods of pain control include medications given by mouth, vein or by nerve block for some surgeries.  We may give you a pain control pump that will:  1) Deliver the medicine through a tube placed in your vein  2) Control the amount of medicine you receive  3) Allow you to push a button to deliver a dose of pain medicine  Sequential Compression Device (SCD) or Pneumo Boots:  You may need to wear SCD S on your legs or feet. These are wraps connected to a machine that pumps in air and releases it. The repeated pumping helps prevent blood clots from forming.   Using an Incentive Spirometer    An incentive spirometer is a device that helps you do deep breathing exercises. These exercises expand your lungs, aid in circulation, and help prevent pneumonia. Deep  breathing exercises also help you breathe better and improve the function of your lungs by:    Keeping your lungs clear    Strengthening your breathing muscles    Helping prevent respiratory complications or problems  The incentive spirometer gives you a way to take an active part in your care. A nurse or therapist will teach you breathing exercises. To do these exercises, you will breathe in through your mouth and not your nose. The incentive spirometer only works correctly if you breathe in through your mouth.    Steps to clear lungs  Step 1. Exhale normally. Then, inhale normally.    Relax and breathe out.  Step 2. Place your lips tightly around the mouthpiece.    Make sure the device is upright and not tilted.  Step 3. Inhale as much air as you can through the mouthpiece (don't breath through your nose).    Inhale slowly and deeply.    Hold your breath long enough to keep the balls or disk raised for at least 3 to 5 seconds, or as instructed by your healthcare provider.  Step 4. Repeat the exercise regularly.    Begin using the Incentive Spirometer one week prior to your surgery, 4 times per day-5 times each.  Enhanced Recovery After Surgery     This is a team effort, including you, to get you back on your feet, eating and drinking normally and out of the hospital as quickly as possible.  The goals are: 1) NO INFECTIONS and   2) RETURN TO NORMAL DIET    How can we achieve these goals?  1) STAY ACTIVE: Walk every day before your surgery; try to increase the amount every day.  Walk after surgery as much as you can-the nurses will help you.  Walking speeds healing and gets you home quicker, you heal better at home and have less risk of infection.     2) INCENTIVE SPIROMETER: Practice your incentive spirometer 4 times per day with 5-10 repetitions each time.  Using the incentive spirometer can strengthen your muscles between your ribs and help you have a strong cough after surgery.  A more effective cough can help  prevent problems with your lungs.    3) STAY HYDRATED: Drink clear liquids up until 2 hours before your surgery. We would like you to purchase a drink such as Gatorade or Ensure Clear (not the milkshake type).  Drink this before bedtime and on the way into the hospital, drink between 8-12 ounces or until you feel hydrated.  Keeping well hydrated leads to your veins being plump, you wake up faster, and you are less likely to be nauseated. Start drinking water as soon as you can after surgery and advance to clear liquids and food as tolerated.  IV fluids contain salt, drinking fluids will minimize the amount of IV fluids you need and decrease the amount of salt you get.    The most common reason for the patient to be readmitted is dehydration. Staying hydrated after you go home from the hospital is very important.  Ensure or Ensure Clear are good options to keep you hydrated.     4) PAIN MANAGEMENT: If we minimize the amount of opioids and narcotics, and use regional blocks (which numb the area where your surgery is) along with oral pain medications; you will have less side effects of nausea and constipation. Narcotics can slow down your bowels and cause you to stay in the hospital longer.     Our goal is to keep you comfortable; eating and drinking normally and back home safely.

## 2018-03-07 NOTE — H&P
Pre-Operative H & P     CC:  Preoperative exam to assess for increased cardiopulmonary risk while undergoing surgery and anesthesia.    Date of Encounter: 3/7/2018  Primary Care Physician:  Beto Rahman  Reason for visit: Malignant neoplasm of ascending colon (H) [C18.2]  - Primary   HPI  Pranav Jackson is a 83 year old male who presents for pre-operative H & P in preparation for laparoscopic right hemicolectomy with Dr. Ibarra on 3/21/18 at [facility]. History is obtained from the patient.   Patient who was discovered to have invasive adenocarcinoma after presenting with black stools and anemia. Staging showed no evidence of metastatic disease. Consult with Dr. Ibarra with above procedure planned.   He was referred to Vascular surgery due to his history of PVD with carotid stenosis and fem-fem bypass. He was seen on 3/1/18. Evaluation revealed bilateral carotid artery stenosis with plaques on both sides. Plaque on the right is soft, plaque on the left is calcified and irregular. He has no history of CVA/TIA. It was recommended that he consider CEA electively after his colorectal surgery. He will follow up with Dr. Walters in the near future. Vascular surgery recommended that patient remain on his aspirin during surgery process if possible.    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      Hx of abnormal bleeding or anti-platelet use:  mg daily    Menstrual history: No LMP for male patient.    Steroid use in the last year: Denies.     Personal or FH with difficulty with Anesthesia:  Denies.     Prior to Admission Medications  Current Outpatient Prescriptions   Medication Sig Dispense Refill     aspirin 325 MG tablet Take 325 mg by mouth every morning        Multiple Vitamins-Minerals (MULTIVITAMIN ADULT PO) Take by mouth every morning        gabapentin (NEURONTIN) 300 MG capsule Take 300 mg by mouth every morning        omeprazole (PRILOSEC) 20 MG CR capsule Take 20 mg by mouth every morning        lisinopril-hydrochlorothiazide (PRINZIDE/ZESTORETIC) 20-12.5 MG per tablet TAKE ONE TABLET BY MOUTH ONCE DAILY IN THE MORNING       metFORMIN (GLUCOPHAGE) 1000 MG tablet Take 1,000 mg by mouth 2 times daily (with meals)        simvastatin (ZOCOR) 20 MG tablet Take 20 mg by mouth At Bedtime        citalopram (CELEXA) 40 MG tablet Take 40 mg by mouth every morning        Ferrous Sulfate (IRON SUPPLEMENT PO) Take 65 mg by mouth 2 times daily (with meals)        Dulaglutide (TRULICITY SC) Inject Subcutaneous once a week WED MORNING       ACETAMINOPHEN PO Take 1,000 mg by mouth 2 times daily         Allergies  Allergies   Allergen Reactions     Penicillins Hives     Tolerated PO cephalosporin 7/2016       Social History  Social History     Social History     Marital status:      Spouse name: N/A     Number of children: N/A     Years of education: N/A     Occupational History     Not on file.     Social History Main Topics     Smoking status: Former Smoker     Packs/day: 1.00     Years: 40.00     Types: Cigarettes     Smokeless tobacco: Never Used      Comment: Quit 20 yearsago.     Alcohol use Yes       Comment: 1x/month     Drug use: No     Sexual activity: Not on file     Other Topics Concern     Not on file     Social History Narrative       Family History  Family History   Problem Relation Age of Onset     Other Cancer Mother      gyn     Other Cancer Father      spine     Thrombosis Son      Factor V Leiden     Thrombosis Daughter        Review of Systems    The complete review of systems is negative other than noted in the HPI or here.     ROS/MED HX    ENT/Pulmonary:     (+)tobacco use, Past use 40 pack year history.   Neurologic:     (+)neuropathy BLE   Cardiovascular: Comment: S/p fem-fem bypass    (+) Dyslipidemia, hypertension-Peripheral Vascular Disease-- Carotid Stenosis and Other, --. Taking blood thinners Pt has received instructions: . . . :. . Previous cardiac testing date:results:Stress Testdate:9/28/17 results:ECG reviewed date:9/2017 results:NSR, no acute ischemic changes date: results:          METS/Exercise Tolerance:  Walks with walker due to leg and left hip pain.   Hematologic:  - FH of Factor V Leiden and DVT/PE in children. No issues for patient.     Musculoskeletal:  - Left hip DJD.     GI/Hepatic:     (+) GERD Asymptomatic on medication,       Renal/Genitourinary:     (+) chronic renal disease, type: CRI, Pt does not require dialysis, Pt has no history of transplant,       Endo:     (+) type II DM Not using insulin - not using insulin pump Diabetic complications: nephropathy neuropathy, .      Psychiatric:     (+) psychiatric history anxiety      Infectious Disease:  - neg infectious disease ROS       Malignancy:   (+) Malignancy History of Other  Other CA colon cancer Active status post         Other:    (+) No chance of pregnancy C-spine cleared: N/A, no H/O Chronic Pain,no other significant disability        Physical Exam      Airway   Mallampati: II  TM distance: >3 FB  Neck ROM: limited      Temp: 97.5  F (36.4  C) Temp src: Oral BP: 133/62 Pulse: 61   Resp: 18 SpO2: 97 %          "204 lbs 11.2 oz  5' 7\"   Body mass index is 32.06 kg/(m^2).       Physical Exam  Constitutional: Awake, alert, cooperative, no apparent distress, and appears stated age. Accompanied by family.  Eyes: Pupils equal, round and reactive to light, extra ocular muscles intact, sclera clear, conjunctiva normal.  HENT: Normocephalic, oral pharynx with moist mucus membranes, dentures. No goiter appreciated. Bilateral hearing aids.  Respiratory: Clear to auscultation bilaterally, no crackles or wheezing. No cough or obvious dyspnea.  Cardiovascular: Regular rate and rhythm, normal S1 and S2, and no murmur noted. Carotids +2, no bruits. No edema. Palpable pulses to radial. Weaker DP and PT arteries.   GI: Normal bowel sounds, soft, non-distended, non-tender, no masses palpated, no hepatosplenomegaly.  Surgical scars: midline, well healed.  Lymph/Hematologic: No cervical lymphadenopathy and no supraclavicular lymphadenopathy.  Genitourinary: Deferred.   Skin: Warm and dry.  No rashes at anticipated surgical site.   Musculoskeletal: Mildly limited neck extension. There is no redness, warmth, or swelling of the joints. Gross motor strength is limited.    Neurologic: Awake, alert, oriented to name, place and time. Cranial nerves II-XII are grossly intact. Gait is irregular.  Neuropsychiatric: Calm, cooperative. Normal affect.     Labs: (personally reviewed)  Lab Results   Component Value Date    WBC 9.6 03/07/2018     Lab Results   Component Value Date    RBC 3.68 03/07/2018     Lab Results   Component Value Date    HGB 10.2 03/07/2018     Lab Results   Component Value Date    HCT 33.5 03/07/2018     Lab Results   Component Value Date    MCV 91 03/07/2018     Lab Results   Component Value Date    MCH 27.7 03/07/2018     Lab Results   Component Value Date    MCHC 30.4 03/07/2018     Lab Results   Component Value Date    RDW 17.1 03/07/2018     Lab Results   Component Value Date     03/07/2018     Last Basic Metabolic " Panel:  Lab Results   Component Value Date     03/07/2018      Lab Results   Component Value Date    POTASSIUM 4.3 03/07/2018     Lab Results   Component Value Date    CHLORIDE 104 03/07/2018     Lab Results   Component Value Date    MIKALA 8.7 03/07/2018     Lab Results   Component Value Date    CO2 23 03/07/2018     Lab Results   Component Value Date    BUN 27 03/07/2018     Lab Results   Component Value Date    CR 0.86 03/07/2018     Lab Results   Component Value Date     03/07/2018   A1C 6.8  EKG: Personally reviewed 9/2017 Normal sinus rhythm, no acute ischemic changes.  Stress test: 9/28/17     ECG Stress Conclusions        Regadenoson stress was performed.         No chest pain with exercise.         Resting and Stress ECG reported separately.     Myocardial Perfusion Conclusions        Myocardial perfusion imaging is normal.  There is no scan         evidence of ischemia or infarction.         Left ventricular ejection fraction is 70%.    3/1/18 Carotid US  Impression:  1. Right side:      Degree of stenosis: >/= 70%. Compatible with same-day CTA  findings.  2. Left side:       Degree of stenosis: >/= 70%. Compatible with same-day CTA  findings.  3. A vessel in the region of the right vertebral artery demonstrates  flow with a velocity of 125 cm/s, however, same-day CTA demonstrates  occlusion of the right vertebral artery. The measured vessel may  represent the most proximal segment of the vertebral artery arising  from the subclavian artery, proximal to the occlusion.  CTA Head 3/1/18  Impression:    1. 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  2. Approximately 65-75% stenosis of the proximal left internal carotid  artery and 55-65% stenosis of the proximal right internal carotid  artery.  3. Widely patent major intracranial arteries. No aneurysm or stenosis.  4. No acute intracranial pathology. Chronic inferior right cerebellar  hemisphere infarct  within the right PICA territory.  5. 4 mm right apical pulmonary nodule. Per the Fleischner guidelines,  follow up can be considered in 12 months if the patient is at high  risk for developing lung cancer.  CT Chest 3/7/18      IMPRESSION:   1. Scattered sub-6 mm solid partially calcified and noncalcified  pulmonary nodules, suggesting granulomata given the partial  calcifications, however given this patient's history of malignancy,  recommend follow-up if no prior studies are available to establish  chronicity.  2. Indeterminate 11 mm mediastinal lymph node. Attention on follow-up.  3. Round medullary lytic lesion in the posterior right 9th rib.  Differential includes multiple myeloma, benign etiologies such as  enchondroma less likely but possible skeletal skeletal metastasis.     Outside records reviewed from: Care Everywhere    ASSESSMENT and PLAN  Pranav Jackson is a 83 year old male scheduled to undergo laparoscopic right hemicolectomy with Dr. Ibarra on 3/21/18. He has the following specific operative considerations:   - RCRI : No serious risk of major adverse cardiac event.   - Anesthesia considerations:  Refer to PAC assessment in anesthesia records  - VTE risk: 3%  - HARRIETT # of risks 4/8 = Intermediate risk  - Risk of PONV score = 2.  If > 2, anti-emetic intervention recommended.     --Adenocarcinoma of colon. Above procedure now planned.   --HLD. Simvastatin. HTN. Will hold Lisinopril-HCTZ on DOS. PVD, s/p carotid artery stenosis and history of fem-fem bypass. Evaluated by Vascular surgery with recommendation for CEA after above procedure.  mg daily and Vascular prefers that he stay on ASA if possible. Recommended that patient switch to 81 mg one week before surgery, and remain on during periop period. After it is surgically safe he should resume his 325 mg. Confirmed with surgery team. Testing above. Good activity tolerance. Consider arterial catheter during surgery to monitor MAP.   --Former smoker.  No pulmonary symptoms.    --GERD. Will take Omeprazole on DOS.    --DM II. Last A1C 6.8. Will hold Trulicity and Metformin on DOS. Neuropathy. Will take Gabapentin on DOS.    --Nephropathy. Normal Cr today.   --Anxiety. Will take Citalopram on DOS.    --Pain management. Discussed possibility of nerve block if appropriate for patient's procedure. Final counseling and decisions by regional team on DOS.   --Severe DJD left hip. Walks with walker. Will require careful positioning.    --Difficult IV stick. Patient becomes easily agitated. May require Vascular Access team.   --Enhanced recovery pathway initiated.  Type and screen drawn today.  Arrival time, NPO, shower and medication instructions provided by nursing staff today. Preparing For Your Surgery handout given.  Patient was discussed with Dr Holcomb.    PARTH Dumont CNS  Preoperative Assessment Center  [facility]  Clinic and Surgery Center  Phone: [phone]  Fax: [phone]

## 2018-03-07 NOTE — DISCHARGE INSTRUCTIONS

## 2018-03-13 DIAGNOSIS — C18.9 COLON CANCER (H): Primary | ICD-10-CM

## 2018-03-13 RX ORDER — NEOMYCIN SULFATE 500 MG/1
TABLET ORAL
Qty: 6 TABLET | Refills: 0 | Status: ON HOLD | OUTPATIENT
Start: 2018-03-13 | End: 2018-03-23

## 2018-03-13 RX ORDER — ONDANSETRON 4 MG/1
TABLET, FILM COATED ORAL
Qty: 3 TABLET | Refills: 0 | Status: ON HOLD | OUTPATIENT
Start: 2018-03-13 | End: 2018-03-23

## 2018-03-13 RX ORDER — METRONIDAZOLE 500 MG/1
500 TABLET ORAL EVERY 8 HOURS
Qty: 3 TABLET | Refills: 0 | Status: SHIPPED | OUTPATIENT
Start: 2018-03-13 | End: 2018-03-14

## 2018-03-13 NOTE — PROGRESS NOTES
The son Ozzy called and requested that the  prescriptions for the fathers bowel prep were sent to the [facility]. Ozzy was called back at [phone] to inform him the prep was sent and will be ready for .

## 2018-03-21 ENCOUNTER — ANESTHESIA (OUTPATIENT)
Dept: SURGERY | Facility: CLINIC | Age: 83
DRG: 331 | End: 2018-03-21
Payer: MEDICARE

## 2018-03-21 ENCOUNTER — HOSPITAL ENCOUNTER (INPATIENT)
Facility: CLINIC | Age: 83
LOS: 3 days | Discharge: HOME | DRG: 331 | End: 2018-03-24
Attending: COLON & RECTAL SURGERY | Admitting: COLON & RECTAL SURGERY
Payer: MEDICARE

## 2018-03-21 DIAGNOSIS — Z90.49 S/P RIGHT HEMICOLECTOMY: Primary | ICD-10-CM

## 2018-03-21 PROBLEM — C18.9 COLON CANCER (H): Status: ACTIVE | Noted: 2018-03-21

## 2018-03-21 LAB
ABO + RH BLD: NORMAL
ABO + RH BLD: NORMAL
BLD GP AB SCN SERPL QL: NORMAL
BLOOD BANK CMNT PATIENT-IMP: NORMAL
BLOOD BANK CMNT PATIENT-IMP: NORMAL
GLUCOSE BLDC GLUCOMTR-MCNC: 151 MG/DL (ref 70–99)
GLUCOSE BLDC GLUCOMTR-MCNC: 157 MG/DL (ref 70–99)
GLUCOSE BLDC GLUCOMTR-MCNC: 159 MG/DL (ref 70–99)
GLUCOSE BLDC GLUCOMTR-MCNC: 163 MG/DL (ref 70–99)
GLUCOSE BLDC GLUCOMTR-MCNC: 217 MG/DL (ref 70–99)
GLUCOSE BLDC GLUCOMTR-MCNC: 242 MG/DL (ref 70–99)
HBA1C MFR BLD: 6.6 % (ref 4.3–6)
PLATELET # BLD AUTO: 315 10E9/L (ref 150–450)
SPECIMEN EXP DATE BLD: NORMAL

## 2018-03-21 PROCEDURE — C9290 INJ, BUPIVACAINE LIPOSOME: HCPCS | Performed by: ANESTHESIOLOGY

## 2018-03-21 PROCEDURE — 83036 HEMOGLOBIN GLYCOSYLATED A1C: CPT | Performed by: COLON & RECTAL SURGERY

## 2018-03-21 PROCEDURE — 36000064 ZZH SURGERY LEVEL 4 EA 15 ADDTL MIN - UMMC: Performed by: COLON & RECTAL SURGERY

## 2018-03-21 PROCEDURE — 25000125 ZZHC RX 250: Performed by: NURSE ANESTHETIST, CERTIFIED REGISTERED

## 2018-03-21 PROCEDURE — 25000132 ZZH RX MED GY IP 250 OP 250 PS 637: Mod: GY | Performed by: STUDENT IN AN ORGANIZED HEALTH CARE EDUCATION/TRAINING PROGRAM

## 2018-03-21 PROCEDURE — A9270 NON-COVERED ITEM OR SERVICE: HCPCS | Mod: GY | Performed by: SURGERY

## 2018-03-21 PROCEDURE — 0DNU4ZZ RELEASE OMENTUM, PERCUTANEOUS ENDOSCOPIC APPROACH: ICD-10-PCS | Performed by: COLON & RECTAL SURGERY

## 2018-03-21 PROCEDURE — 36000062 ZZH SURGERY LEVEL 4 1ST 30 MIN - UMMC: Performed by: COLON & RECTAL SURGERY

## 2018-03-21 PROCEDURE — 88309 TISSUE EXAM BY PATHOLOGIST: CPT | Performed by: COLON & RECTAL SURGERY

## 2018-03-21 PROCEDURE — 71000014 ZZH RECOVERY PHASE 1 LEVEL 2 FIRST HR: Performed by: COLON & RECTAL SURGERY

## 2018-03-21 PROCEDURE — 40000170 ZZH STATISTIC PRE-PROCEDURE ASSESSMENT II: Performed by: COLON & RECTAL SURGERY

## 2018-03-21 PROCEDURE — 88342 IMHCHEM/IMCYTCHM 1ST ANTB: CPT | Performed by: COLON & RECTAL SURGERY

## 2018-03-21 PROCEDURE — C9399 UNCLASSIFIED DRUGS OR BIOLOG: HCPCS | Performed by: NURSE ANESTHETIST, CERTIFIED REGISTERED

## 2018-03-21 PROCEDURE — 40000556 ZZH STATISTIC PERIPHERAL IV START W US GUIDANCE

## 2018-03-21 PROCEDURE — 27210794 ZZH OR GENERAL SUPPLY STERILE: Performed by: COLON & RECTAL SURGERY

## 2018-03-21 PROCEDURE — 25000128 H RX IP 250 OP 636: Performed by: ANESTHESIOLOGY

## 2018-03-21 PROCEDURE — 25000132 ZZH RX MED GY IP 250 OP 250 PS 637: Mod: GY | Performed by: COLON & RECTAL SURGERY

## 2018-03-21 PROCEDURE — 25000125 ZZHC RX 250: Performed by: ANESTHESIOLOGY

## 2018-03-21 PROCEDURE — 37000009 ZZH ANESTHESIA TECHNICAL FEE, EACH ADDTL 15 MIN: Performed by: COLON & RECTAL SURGERY

## 2018-03-21 PROCEDURE — 25000132 ZZH RX MED GY IP 250 OP 250 PS 637: Mod: GY | Performed by: SURGERY

## 2018-03-21 PROCEDURE — A9270 NON-COVERED ITEM OR SERVICE: HCPCS | Mod: GY | Performed by: STUDENT IN AN ORGANIZED HEALTH CARE EDUCATION/TRAINING PROGRAM

## 2018-03-21 PROCEDURE — 25000132 ZZH RX MED GY IP 250 OP 250 PS 637: Mod: GY | Performed by: PHYSICIAN ASSISTANT

## 2018-03-21 PROCEDURE — 25000128 H RX IP 250 OP 636: Performed by: SURGERY

## 2018-03-21 PROCEDURE — 27210995 ZZH RX 272: Performed by: COLON & RECTAL SURGERY

## 2018-03-21 PROCEDURE — 0DTF0ZZ RESECTION OF RIGHT LARGE INTESTINE, OPEN APPROACH: ICD-10-PCS | Performed by: COLON & RECTAL SURGERY

## 2018-03-21 PROCEDURE — A9270 NON-COVERED ITEM OR SERVICE: HCPCS | Mod: GY | Performed by: COLON & RECTAL SURGERY

## 2018-03-21 PROCEDURE — A9270 NON-COVERED ITEM OR SERVICE: HCPCS | Mod: GY | Performed by: PHYSICIAN ASSISTANT

## 2018-03-21 PROCEDURE — 71000015 ZZH RECOVERY PHASE 1 LEVEL 2 EA ADDTL HR: Performed by: COLON & RECTAL SURGERY

## 2018-03-21 PROCEDURE — 36415 COLL VENOUS BLD VENIPUNCTURE: CPT | Performed by: COLON & RECTAL SURGERY

## 2018-03-21 PROCEDURE — 12000008 ZZH R&B INTERMEDIATE UMMC

## 2018-03-21 PROCEDURE — 25000566 ZZH SEVOFLURANE, EA 15 MIN: Performed by: COLON & RECTAL SURGERY

## 2018-03-21 PROCEDURE — 25000128 H RX IP 250 OP 636: Performed by: NURSE ANESTHETIST, CERTIFIED REGISTERED

## 2018-03-21 PROCEDURE — 25000131 ZZH RX MED GY IP 250 OP 636 PS 637: Mod: GY | Performed by: NURSE ANESTHETIST, CERTIFIED REGISTERED

## 2018-03-21 PROCEDURE — 37000008 ZZH ANESTHESIA TECHNICAL FEE, 1ST 30 MIN: Performed by: COLON & RECTAL SURGERY

## 2018-03-21 PROCEDURE — 00000146 ZZHCL STATISTIC GLUCOSE BY METER IP

## 2018-03-21 PROCEDURE — 85049 AUTOMATED PLATELET COUNT: CPT | Performed by: COLON & RECTAL SURGERY

## 2018-03-21 PROCEDURE — 88341 IMHCHEM/IMCYTCHM EA ADD ANTB: CPT | Performed by: COLON & RECTAL SURGERY

## 2018-03-21 PROCEDURE — 25000125 ZZHC RX 250: Performed by: COLON & RECTAL SURGERY

## 2018-03-21 PROCEDURE — 40000275 ZZH STATISTIC RCP TIME EA 10 MIN

## 2018-03-21 PROCEDURE — 40000014 ZZH STATISTIC ARTERIAL MONITORING DAILY

## 2018-03-21 PROCEDURE — 25000131 ZZH RX MED GY IP 250 OP 636 PS 637: Mod: GY | Performed by: SURGERY

## 2018-03-21 RX ORDER — SODIUM CHLORIDE, SODIUM LACTATE, POTASSIUM CHLORIDE, CALCIUM CHLORIDE 600; 310; 30; 20 MG/100ML; MG/100ML; MG/100ML; MG/100ML
INJECTION, SOLUTION INTRAVENOUS CONTINUOUS
Status: DISCONTINUED | OUTPATIENT
Start: 2018-03-21 | End: 2018-03-21 | Stop reason: HOSPADM

## 2018-03-21 RX ORDER — DEXTROSE MONOHYDRATE 25 G/50ML
25-50 INJECTION, SOLUTION INTRAVENOUS
Status: DISCONTINUED | OUTPATIENT
Start: 2018-03-21 | End: 2018-03-24 | Stop reason: HOSPADM

## 2018-03-21 RX ORDER — GABAPENTIN 300 MG/1
300 CAPSULE ORAL EVERY MORNING
Status: DISCONTINUED | OUTPATIENT
Start: 2018-03-22 | End: 2018-03-24 | Stop reason: HOSPADM

## 2018-03-21 RX ORDER — ASPIRIN 81 MG/1
81 TABLET, CHEWABLE ORAL DAILY
Status: DISCONTINUED | OUTPATIENT
Start: 2018-03-21 | End: 2018-03-23

## 2018-03-21 RX ORDER — HYDRALAZINE HYDROCHLORIDE 20 MG/ML
2.5-5 INJECTION INTRAMUSCULAR; INTRAVENOUS EVERY 10 MIN PRN
Status: DISCONTINUED | OUTPATIENT
Start: 2018-03-21 | End: 2018-03-21 | Stop reason: HOSPADM

## 2018-03-21 RX ORDER — CITALOPRAM HYDROBROMIDE 20 MG/1
40 TABLET ORAL EVERY MORNING
Status: DISCONTINUED | OUTPATIENT
Start: 2018-03-22 | End: 2018-03-24 | Stop reason: HOSPADM

## 2018-03-21 RX ORDER — BUPIVACAINE HYDROCHLORIDE 2.5 MG/ML
INJECTION, SOLUTION EPIDURAL; INFILTRATION; INTRACAUDAL PRN
Status: DISCONTINUED | OUTPATIENT
Start: 2018-03-21 | End: 2018-03-21

## 2018-03-21 RX ORDER — LABETALOL HYDROCHLORIDE 5 MG/ML
10 INJECTION, SOLUTION INTRAVENOUS
Status: DISCONTINUED | OUTPATIENT
Start: 2018-03-21 | End: 2018-03-21 | Stop reason: HOSPADM

## 2018-03-21 RX ORDER — ESMOLOL HYDROCHLORIDE 10 MG/ML
INJECTION INTRAVENOUS PRN
Status: DISCONTINUED | OUTPATIENT
Start: 2018-03-21 | End: 2018-03-21

## 2018-03-21 RX ORDER — PROPOFOL 10 MG/ML
INJECTION, EMULSION INTRAVENOUS PRN
Status: DISCONTINUED | OUTPATIENT
Start: 2018-03-21 | End: 2018-03-21

## 2018-03-21 RX ORDER — NICOTINE POLACRILEX 4 MG
15-30 LOZENGE BUCCAL
Status: DISCONTINUED | OUTPATIENT
Start: 2018-03-21 | End: 2018-03-24 | Stop reason: HOSPADM

## 2018-03-21 RX ORDER — ASPIRIN 81 MG/1
81 TABLET, CHEWABLE ORAL ONCE
Status: COMPLETED | OUTPATIENT
Start: 2018-03-21 | End: 2018-03-21

## 2018-03-21 RX ORDER — LABETALOL HYDROCHLORIDE 5 MG/ML
INJECTION, SOLUTION INTRAVENOUS PRN
Status: DISCONTINUED | OUTPATIENT
Start: 2018-03-21 | End: 2018-03-21

## 2018-03-21 RX ORDER — HEPARIN SODIUM 5000 [USP'U]/.5ML
5000 INJECTION, SOLUTION INTRAVENOUS; SUBCUTANEOUS EVERY 8 HOURS
Status: DISCONTINUED | OUTPATIENT
Start: 2018-03-22 | End: 2018-03-22

## 2018-03-21 RX ORDER — CEFOTETAN DISODIUM 2 G/20ML
2 INJECTION, POWDER, FOR SOLUTION INTRAMUSCULAR; INTRAVENOUS
Status: DISCONTINUED | OUTPATIENT
Start: 2018-03-21 | End: 2018-03-21 | Stop reason: HOSPADM

## 2018-03-21 RX ORDER — ONDANSETRON 2 MG/ML
INJECTION INTRAMUSCULAR; INTRAVENOUS PRN
Status: DISCONTINUED | OUTPATIENT
Start: 2018-03-21 | End: 2018-03-21

## 2018-03-21 RX ORDER — PROPOFOL 10 MG/ML
INJECTION, EMULSION INTRAVENOUS CONTINUOUS PRN
Status: DISCONTINUED | OUTPATIENT
Start: 2018-03-21 | End: 2018-03-21

## 2018-03-21 RX ORDER — OXYCODONE HYDROCHLORIDE 5 MG/1
5-10 TABLET ORAL EVERY 4 HOURS PRN
Status: DISCONTINUED | OUTPATIENT
Start: 2018-03-21 | End: 2018-03-21

## 2018-03-21 RX ORDER — NALOXONE HYDROCHLORIDE 0.4 MG/ML
.1-.4 INJECTION, SOLUTION INTRAMUSCULAR; INTRAVENOUS; SUBCUTANEOUS
Status: DISCONTINUED | OUTPATIENT
Start: 2018-03-21 | End: 2018-03-21 | Stop reason: HOSPADM

## 2018-03-21 RX ORDER — LIDOCAINE HYDROCHLORIDE 20 MG/ML
INJECTION, SOLUTION INFILTRATION; PERINEURAL PRN
Status: DISCONTINUED | OUTPATIENT
Start: 2018-03-21 | End: 2018-03-21

## 2018-03-21 RX ORDER — EPHEDRINE SULFATE 50 MG/ML
INJECTION, SOLUTION INTRAMUSCULAR; INTRAVENOUS; SUBCUTANEOUS PRN
Status: DISCONTINUED | OUTPATIENT
Start: 2018-03-21 | End: 2018-03-21

## 2018-03-21 RX ORDER — ALVIMOPAN 12 MG/1
12 CAPSULE ORAL 2 TIMES DAILY
Status: DISCONTINUED | OUTPATIENT
Start: 2018-03-22 | End: 2018-03-21

## 2018-03-21 RX ORDER — NALOXONE HYDROCHLORIDE 0.4 MG/ML
.1-.4 INJECTION, SOLUTION INTRAMUSCULAR; INTRAVENOUS; SUBCUTANEOUS
Status: DISCONTINUED | OUTPATIENT
Start: 2018-03-21 | End: 2018-03-22

## 2018-03-21 RX ORDER — CEFOTETAN DISODIUM 2 G/20ML
2 INJECTION, POWDER, FOR SOLUTION INTRAMUSCULAR; INTRAVENOUS SEE ADMIN INSTRUCTIONS
Status: DISCONTINUED | OUTPATIENT
Start: 2018-03-21 | End: 2018-03-21 | Stop reason: HOSPADM

## 2018-03-21 RX ORDER — FENTANYL CITRATE 50 UG/ML
25-50 INJECTION, SOLUTION INTRAMUSCULAR; INTRAVENOUS
Status: DISCONTINUED | OUTPATIENT
Start: 2018-03-21 | End: 2018-03-21 | Stop reason: HOSPADM

## 2018-03-21 RX ORDER — ONDANSETRON 2 MG/ML
4 INJECTION INTRAMUSCULAR; INTRAVENOUS EVERY 30 MIN PRN
Status: DISCONTINUED | OUTPATIENT
Start: 2018-03-21 | End: 2018-03-21 | Stop reason: HOSPADM

## 2018-03-21 RX ORDER — GLYCOPYRROLATE 0.2 MG/ML
INJECTION, SOLUTION INTRAMUSCULAR; INTRAVENOUS PRN
Status: DISCONTINUED | OUTPATIENT
Start: 2018-03-21 | End: 2018-03-21

## 2018-03-21 RX ORDER — HYDRALAZINE HYDROCHLORIDE 20 MG/ML
INJECTION INTRAMUSCULAR; INTRAVENOUS PRN
Status: DISCONTINUED | OUTPATIENT
Start: 2018-03-21 | End: 2018-03-21

## 2018-03-21 RX ORDER — ONDANSETRON 4 MG/1
4 TABLET, ORALLY DISINTEGRATING ORAL EVERY 30 MIN PRN
Status: DISCONTINUED | OUTPATIENT
Start: 2018-03-21 | End: 2018-03-21 | Stop reason: HOSPADM

## 2018-03-21 RX ORDER — CITALOPRAM HYDROBROMIDE 20 MG/1
40 TABLET ORAL ONCE
Status: COMPLETED | OUTPATIENT
Start: 2018-03-21 | End: 2018-03-21

## 2018-03-21 RX ORDER — ACETAMINOPHEN 325 MG/1
975 TABLET ORAL ONCE
Status: COMPLETED | OUTPATIENT
Start: 2018-03-21 | End: 2018-03-21

## 2018-03-21 RX ORDER — NALOXONE HYDROCHLORIDE 0.4 MG/ML
.1-.4 INJECTION, SOLUTION INTRAMUSCULAR; INTRAVENOUS; SUBCUTANEOUS
Status: DISCONTINUED | OUTPATIENT
Start: 2018-03-21 | End: 2018-03-24 | Stop reason: HOSPADM

## 2018-03-21 RX ORDER — FENTANYL CITRATE 50 UG/ML
INJECTION, SOLUTION INTRAMUSCULAR; INTRAVENOUS PRN
Status: DISCONTINUED | OUTPATIENT
Start: 2018-03-21 | End: 2018-03-21

## 2018-03-21 RX ORDER — LIDOCAINE 40 MG/G
CREAM TOPICAL
Status: DISCONTINUED | OUTPATIENT
Start: 2018-03-21 | End: 2018-03-24 | Stop reason: HOSPADM

## 2018-03-21 RX ORDER — HYDROMORPHONE HCL/0.9% NACL/PF 0.2MG/0.2
.2-.4 SYRINGE (ML) INTRAVENOUS
Status: DISCONTINUED | OUTPATIENT
Start: 2018-03-21 | End: 2018-03-24 | Stop reason: HOSPADM

## 2018-03-21 RX ORDER — ACETAMINOPHEN 325 MG/1
975 TABLET ORAL EVERY 8 HOURS
Status: DISCONTINUED | OUTPATIENT
Start: 2018-03-21 | End: 2018-03-23

## 2018-03-21 RX ORDER — FLUMAZENIL 0.1 MG/ML
0.2 INJECTION, SOLUTION INTRAVENOUS
Status: DISCONTINUED | OUTPATIENT
Start: 2018-03-21 | End: 2018-03-21 | Stop reason: HOSPADM

## 2018-03-21 RX ORDER — CITALOPRAM HYDROBROMIDE 20 MG/1
20 TABLET ORAL ONCE
Status: DISCONTINUED | OUTPATIENT
Start: 2018-03-21 | End: 2018-03-21 | Stop reason: CLARIF

## 2018-03-21 RX ORDER — SODIUM CHLORIDE, SODIUM LACTATE, POTASSIUM CHLORIDE, CALCIUM CHLORIDE 600; 310; 30; 20 MG/100ML; MG/100ML; MG/100ML; MG/100ML
INJECTION, SOLUTION INTRAVENOUS CONTINUOUS
Status: DISCONTINUED | OUTPATIENT
Start: 2018-03-21 | End: 2018-03-22

## 2018-03-21 RX ORDER — HYDRALAZINE HYDROCHLORIDE 20 MG/ML
10 INJECTION INTRAMUSCULAR; INTRAVENOUS EVERY 4 HOURS PRN
Status: DISCONTINUED | OUTPATIENT
Start: 2018-03-21 | End: 2018-03-23

## 2018-03-21 RX ORDER — SODIUM CHLORIDE, SODIUM LACTATE, POTASSIUM CHLORIDE, CALCIUM CHLORIDE 600; 310; 30; 20 MG/100ML; MG/100ML; MG/100ML; MG/100ML
INJECTION, SOLUTION INTRAVENOUS CONTINUOUS PRN
Status: DISCONTINUED | OUTPATIENT
Start: 2018-03-21 | End: 2018-03-21

## 2018-03-21 RX ADMIN — HYDRALAZINE HYDROCHLORIDE 4 MG: 20 INJECTION INTRAMUSCULAR; INTRAVENOUS at 09:40

## 2018-03-21 RX ADMIN — FENTANYL CITRATE 50 MCG: 50 INJECTION, SOLUTION INTRAMUSCULAR; INTRAVENOUS at 09:35

## 2018-03-21 RX ADMIN — FENTANYL CITRATE 25 MCG: 50 INJECTION INTRAMUSCULAR; INTRAVENOUS at 12:01

## 2018-03-21 RX ADMIN — PHENYLEPHRINE HYDROCHLORIDE 100 MCG: 10 INJECTION, SOLUTION INTRAMUSCULAR; INTRAVENOUS; SUBCUTANEOUS at 10:20

## 2018-03-21 RX ADMIN — Medication 0.3 MG: at 12:41

## 2018-03-21 RX ADMIN — FENTANYL CITRATE 25 MCG: 50 INJECTION, SOLUTION INTRAMUSCULAR; INTRAVENOUS at 07:14

## 2018-03-21 RX ADMIN — ACETAMINOPHEN 975 MG: 325 TABLET, FILM COATED ORAL at 06:33

## 2018-03-21 RX ADMIN — FENTANYL CITRATE 25 MCG: 50 INJECTION, SOLUTION INTRAMUSCULAR; INTRAVENOUS at 11:12

## 2018-03-21 RX ADMIN — HYDROMORPHONE HYDROCHLORIDE 0.3 MG: 1 INJECTION, SOLUTION INTRAMUSCULAR; INTRAVENOUS; SUBCUTANEOUS at 11:12

## 2018-03-21 RX ADMIN — Medication 5 MG: at 08:22

## 2018-03-21 RX ADMIN — Medication 0.2 MG: at 14:46

## 2018-03-21 RX ADMIN — GLYCOPYRROLATE 0.2 MG: 0.2 INJECTION, SOLUTION INTRAMUSCULAR; INTRAVENOUS at 08:22

## 2018-03-21 RX ADMIN — ACETAMINOPHEN 975 MG: 325 TABLET, FILM COATED ORAL at 14:24

## 2018-03-21 RX ADMIN — FENTANYL CITRATE 50 MCG: 50 INJECTION, SOLUTION INTRAMUSCULAR; INTRAVENOUS at 08:38

## 2018-03-21 RX ADMIN — INSULIN ASPART 1 UNITS: 100 INJECTION, SOLUTION INTRAVENOUS; SUBCUTANEOUS at 15:53

## 2018-03-21 RX ADMIN — INSULIN ASPART 1 UNITS: 100 INJECTION, SOLUTION INTRAVENOUS; SUBCUTANEOUS at 20:21

## 2018-03-21 RX ADMIN — SODIUM CHLORIDE, POTASSIUM CHLORIDE, SODIUM LACTATE AND CALCIUM CHLORIDE: 600; 310; 30; 20 INJECTION, SOLUTION INTRAVENOUS at 09:00

## 2018-03-21 RX ADMIN — ROCURONIUM BROMIDE 20 MG: 10 INJECTION INTRAVENOUS at 08:47

## 2018-03-21 RX ADMIN — INSULIN ASPART 3 UNITS: 100 INJECTION, SOLUTION INTRAVENOUS; SUBCUTANEOUS at 12:05

## 2018-03-21 RX ADMIN — LABETALOL HYDROCHLORIDE 2.5 MG: 5 INJECTION INTRAVENOUS at 10:55

## 2018-03-21 RX ADMIN — PROPOFOL 50 MG: 10 INJECTION, EMULSION INTRAVENOUS at 07:43

## 2018-03-21 RX ADMIN — HYDRALAZINE HYDROCHLORIDE 4 MG: 20 INJECTION INTRAMUSCULAR; INTRAVENOUS at 10:50

## 2018-03-21 RX ADMIN — Medication 10 MG: at 07:52

## 2018-03-21 RX ADMIN — OXYCODONE HYDROCHLORIDE 2.5 MG: 5 TABLET ORAL at 22:48

## 2018-03-21 RX ADMIN — BENZOCAINE, MENTHOL 1 LOZENGE: 15; 3.6 LOZENGE ORAL at 20:21

## 2018-03-21 RX ADMIN — SODIUM CHLORIDE, POTASSIUM CHLORIDE, SODIUM LACTATE AND CALCIUM CHLORIDE: 600; 310; 30; 20 INJECTION, SOLUTION INTRAVENOUS at 07:27

## 2018-03-21 RX ADMIN — ONDANSETRON 4 MG: 2 INJECTION INTRAMUSCULAR; INTRAVENOUS at 10:55

## 2018-03-21 RX ADMIN — FENTANYL CITRATE 25 MCG: 50 INJECTION INTRAMUSCULAR; INTRAVENOUS at 12:15

## 2018-03-21 RX ADMIN — LABETALOL HYDROCHLORIDE 2.5 MG: 5 INJECTION INTRAVENOUS at 11:34

## 2018-03-21 RX ADMIN — HYDRALAZINE HYDROCHLORIDE 4 MG: 20 INJECTION INTRAMUSCULAR; INTRAVENOUS at 08:59

## 2018-03-21 RX ADMIN — CEFOTETAN DISODIUM 2 G: 2 INJECTION, POWDER, FOR SOLUTION INTRAMUSCULAR; INTRAVENOUS at 07:11

## 2018-03-21 RX ADMIN — PROPOFOL 30 MG: 10 INJECTION, EMULSION INTRAVENOUS at 08:53

## 2018-03-21 RX ADMIN — PROPOFOL 150 MCG/KG/MIN: 10 INJECTION, EMULSION INTRAVENOUS at 08:05

## 2018-03-21 RX ADMIN — PROPOFOL 100 MG: 10 INJECTION, EMULSION INTRAVENOUS at 07:38

## 2018-03-21 RX ADMIN — HYDROMORPHONE HYDROCHLORIDE 0.3 MG: 1 INJECTION, SOLUTION INTRAMUSCULAR; INTRAVENOUS; SUBCUTANEOUS at 11:06

## 2018-03-21 RX ADMIN — ASPIRIN 81 MG CHEWABLE TABLET 81 MG: 81 TABLET CHEWABLE at 16:44

## 2018-03-21 RX ADMIN — FENTANYL CITRATE 50 MCG: 50 INJECTION, SOLUTION INTRAMUSCULAR; INTRAVENOUS at 07:38

## 2018-03-21 RX ADMIN — CITALOPRAM HYDROBROMIDE 40 MG: 20 TABLET ORAL at 19:39

## 2018-03-21 RX ADMIN — HYDRALAZINE HYDROCHLORIDE 4 MG: 20 INJECTION INTRAMUSCULAR; INTRAVENOUS at 09:31

## 2018-03-21 RX ADMIN — ROCURONIUM BROMIDE 20 MG: 10 INJECTION INTRAVENOUS at 10:19

## 2018-03-21 RX ADMIN — ACETAMINOPHEN 975 MG: 325 TABLET, FILM COATED ORAL at 20:22

## 2018-03-21 RX ADMIN — FENTANYL CITRATE 50 MCG: 50 INJECTION, SOLUTION INTRAMUSCULAR; INTRAVENOUS at 08:51

## 2018-03-21 RX ADMIN — Medication 0.4 MG: at 17:44

## 2018-03-21 RX ADMIN — Medication 0.2 MG: at 19:39

## 2018-03-21 RX ADMIN — ROCURONIUM BROMIDE 20 MG: 10 INJECTION INTRAVENOUS at 08:20

## 2018-03-21 RX ADMIN — ESMOLOL HYDROCHLORIDE 20 MG: 10 INJECTION, SOLUTION INTRAVENOUS at 08:55

## 2018-03-21 RX ADMIN — LABETALOL HYDROCHLORIDE 5 MG: 5 INJECTION INTRAVENOUS at 09:45

## 2018-03-21 RX ADMIN — BUPIVACAINE 20 ML: 13.3 INJECTION, SUSPENSION, LIPOSOMAL INFILTRATION at 07:11

## 2018-03-21 RX ADMIN — LIDOCAINE HYDROCHLORIDE 100 MG: 20 INJECTION, SOLUTION INFILTRATION; PERINEURAL at 07:38

## 2018-03-21 RX ADMIN — BUPIVACAINE HYDROCHLORIDE 20 ML: 2.5 INJECTION, SOLUTION EPIDURAL; INFILTRATION; INTRACAUDAL at 07:11

## 2018-03-21 RX ADMIN — ASPIRIN 81 MG: 81 TABLET, COATED ORAL at 07:25

## 2018-03-21 RX ADMIN — HYDROMORPHONE HYDROCHLORIDE 0.2 MG: 1 INJECTION, SOLUTION INTRAMUSCULAR; INTRAVENOUS; SUBCUTANEOUS at 11:00

## 2018-03-21 RX ADMIN — ROCURONIUM BROMIDE 50 MG: 10 INJECTION INTRAVENOUS at 07:43

## 2018-03-21 RX ADMIN — ROCURONIUM BROMIDE 10 MG: 10 INJECTION INTRAVENOUS at 09:46

## 2018-03-21 RX ADMIN — Medication 0.2 MG: at 15:48

## 2018-03-21 RX ADMIN — SUGAMMADEX 180 MG: 100 INJECTION, SOLUTION INTRAVENOUS at 11:10

## 2018-03-21 RX ADMIN — ESMOLOL HYDROCHLORIDE 20 MG: 10 INJECTION, SOLUTION INTRAVENOUS at 07:38

## 2018-03-21 RX ADMIN — FENTANYL CITRATE 25 MCG: 50 INJECTION INTRAMUSCULAR; INTRAVENOUS at 12:07

## 2018-03-21 ASSESSMENT — PAIN DESCRIPTION - DESCRIPTORS
DESCRIPTORS: SORE
DESCRIPTORS: ACHING;SORE

## 2018-03-21 NOTE — ANESTHESIA PROCEDURE NOTES
Arterial Line Procedure Note  Staff:     Anesthesiologist:  ANABELLE LEÓN  Location: In OR After Induction  Procedure Start/Stop Times:     patient identified, IV checked, site marked, risks and benefits discussed, informed consent, monitors and equipment checked, pre-op evaluation and at physician/surgeon's request      Correct Patient: Yes      Correct Position: Yes      Correct Site: Yes      Correct Procedure: Yes      Correct Laterality:  Yes    Site Marked:  Yes  Line Placement:     Procedure:  Arterial Line    Insertion Site:  Radial    Insertion laterality:  Left    Skin Prep: Chloraprep      Patient Prep: patient draped      Local skin infiltration:  None    Ultrasound Guided?: No      Catheter size:  20 gauge, 12 cm    Cath secured with: suture      Complications:  None obvious    Arterial waveform: Yes      IBP within 10% of NIBP: Yes

## 2018-03-21 NOTE — OP NOTE
Procedure Date: 03/21/2018      DATE OF PROCEDURE:  03/21/2018      PREOPERATIVE DIAGNOSIS:  Carcinoma of the ascending colon.      POSTOPERATIVE DIAGNOSIS:  Carcinoma of the ascending colon.      PROCEDURE:  Laparoscopic right hemicolectomy, extensive lysis of adhesions (greater than 1 hour).      HISTORY:  This 83-year-old man presented with anemia and black stools and was found to have an ascending colon adenocarcinoma.  There was no evidence of hepatic metastatic disease.  Several calcified and noncalcified nodules in the lung were felt most likely related to granulomas, though followup was recommended.  The patient has had a previous attempted aortobifemoral graft, but his aortic calcification was too extensive and the surgery was aborted.  He has known atherosclerotic cerebrovascular disease and was seen in consultation preoperatively by Dr. Walters, who advised resection of the colon cancer first with potential consideration for carotid endarterectomy in the future.  We plan to perform the surgery with the patient maintained on aspirin.  I discussed the risks of surgery in detail to the patient and his family.  I answered all of his questions to his stated satisfaction.  He expressed understanding and provided informed consent.      SURGEON:  Brandin Ibarra MD      ASSISTANT:  Luc Balderas MD; Rohit Delgado MD.      DESCRIPTION OF PROCEDURE:  After induction of adequate endotracheal anesthesia, the patient was placed in supine position on the operating table.  The abdomen was prepped and draped in a sterile fashion.  A timeout was performed and the patient and procedure confirmed.  We began by attempting placement of a balloon port using a Angulo technique just above the umbilicus at the inferior portion of the patient's previous scar.  We encountered adherent abdominal content beneath the incision and this did not appear to be a suitable point for entry.  We next used a Veress needle to access the abdomen  at [place]aya's point.  We confirmed that the needle was within the peritoneal cavity with aspiration and a saline drip test.  A pneumoperitoneum was established.  We used an optical trocar to place a 5-mm port in the left upper quadrant.  Laparoscopic exploration demonstrated nothing suggestive of hepatic metastatic disease.  There were moderately extensive adhesions to the anterior abdominal wall.  We placed a 5 mm port in the left lower quadrant and took down the adhesions using a LigaSure.  During this process we placed our third 5 mm port in the lower midline.  Once we had free access to the abdominal cavity, laparoscopic inspection showed no evidence of intraperitoneal malignancy.  There was a prominent tattoo in the mid ascending colon.  We began a medial to lateral dissection.  We isolated the ileocolic pedicle and completely freed this.  We identified and preserved the duodenum.  We mobilized completely the abdominal wall.  We created a mesenteric window.  Because of the patient's calcific atherosclerotic disease, I felt it would be safer to divide his pedicle with a stapler.  We used a 30 mm vascular load Endo-CAITLIN and divided the pedicle at its base.  Careful inspection demonstrated that the pedicle was entirely hemostatic.  We next performed our medial to lateral dissection.  The transverse colon was adherent to the lower margin of the liver and there were additional moderately dense adhesions to the gallbladder.  These were all taken down using the LigaSure.  Total lysis of adhesions time was approximately 1 hour and 15 minutes.  At this point, we had excellent mobility of the colon, so we released our pneumoperitoneum and created a short midline incision beginning at the 12-mm balloon port above the umbilicus.  An Adrian retractor was placed.  The colon and ileum were easily delivered into the wound.  We selected points for transection in the right transverse colon, preserving the middle colic vessels, and  in the distal ileum.  The remaining mesentery was taken down using the LigaSure.  The wound was draped off with towels and a functional end-to-end anastomosis was performed with a longitudinal antimesenteric firing of the CAITLIN 80 blue load stapler, including the antimesenteric tenia of the transverse colon and the anastomosis.  The staple line was inspected and was hemostatic.  The staple lines were offset and the anastomosis was completed with a perpendicular application of a 90 mm Ethicon linear stapler at the blue load setting.  The specimen was clamped, resected, and sent to pathology for permanent section.  The tumor was easily palpable underlying the tattoo.  The TA staple line was inspected and was hemostatic.  The apex of the anastomosis was reinforced with a 3-0 Vicryl stitch.  The anastomosis was patulous, tension free and well vascularized.  It was returned to the abdomen.  We had utilized a 12 mm trocar in the left lower quadrant to permit use of the Endo-CAITLIN and this trocar site was closed with 0 Vicryl using a Dale-Rock needle under direct laparoscopic vision.  The midline fascia was closed with running #1 PDS suture.  The subcutaneous tissues were irrigated.  The skin was closed with 4-0 Monocryl and Dermabond.        Estimated blood loss was 5 mL.  The patient tolerated the procedure well and without evident complications.  Sponge, needle and instrument counts were reported as correct at the conclusion of the case x 2.         AIDEE BUSTILLOS MD             D: 2018   T: 2018   MT: CC      Name:     SOCORRO STEINBERG   MRN:      [id number]        Account:        [id number]   :      1934           Procedure Date: 2018      Document: [id number]       cc: REYNOLD Mcgrath DO       [facility] Dinorah Rahman DO

## 2018-03-21 NOTE — PLAN OF CARE
Problem: Patient Care Overview  Goal: Plan of Care/Patient Progress Review  Outcome: No Change  VSS since arriving to PCU around 1330- has a hx of HTN and bradycardic at times. Pt c/o pain controlled using PRN and scheduled analgesics. Pt tolerating clear liquids well; denies N/V. UOP adequate via conley. No gas/last BM 3/20. PIV to right hand SL, right forearm running LR at 75 mL/hr. Pt turned using 2 assist to remove linens; has not yet dangled. Adult children came to visit- were very supportive. PLAN: continue POC    Pt ok with bedside report if awake.

## 2018-03-21 NOTE — IP AVS SNAPSHOT
[address] [address]    Phone:  [phone]                                       After Visit Summary   3/21/2018    Pranav Jackson    MRN: [id number]           After Visit Summary Signature Page     I have received my discharge instructions, and my questions have been answered. I have discussed any challenges I see with this plan with the nurse or doctor.    ..........................................................................................................................................  Patient/Patient Representative Signature      ..........................................................................................................................................  Patient Representative Print Name and Relationship to Patient    ..................................................               ................................................  Date                                            Time    ..........................................................................................................................................  Reviewed by Signature/Title    ...................................................              ..............................................  Date                                                            Time

## 2018-03-21 NOTE — ANESTHESIA POSTPROCEDURE EVALUATION
Patient: Pranav Jackson    Procedure(s):  Laparoscopic Right Hemicolectomy, Extensive Lysis of Adhesions,, Anesthesia Block (ERAS Patient) - Wound Class: II-Clean Contaminated    Diagnosis:Malignant Neoplasm Of Ascending Colon   Diagnosis Additional Information: No value filed.    Anesthesia Type:  No value filed.    Note:  Anesthesia Post Evaluation    Patient location during evaluation: PACU and Bedside  Patient participation: Able to fully participate in evaluation  Level of consciousness: awake and alert  Pain management: adequate  Airway patency: patent  Cardiovascular status: acceptable  Respiratory status: acceptable  Hydration status: acceptable  PONV: none     Anesthetic complications: None          Last vitals:  Vitals:    03/21/18 1215 03/21/18 1230 03/21/18 1245   BP: 145/54 157/62 144/63   Resp: 18 16 18   Temp: 36  C (96.8  F) 36.1  C (97  F) 36.1  C (97  F)   SpO2: 95% 93% 96%         Electronically Signed By: Kerline Weiner MD  March 21, 2018  12:55 PM

## 2018-03-21 NOTE — ADDENDUM NOTE
Addendum  created 03/21/18 1435 by Kerline Weiner MD    Anesthesia Attestations filed, Anesthesia Intra Blocks edited, Child order released for a procedure order, Sign clinical note

## 2018-03-21 NOTE — PROGRESS NOTES
"Colorectal surgery postop check.    Subjective:  Patient reports doing well pain is minimal, has a great sense of humor all family at bedside no distress.    Objective:  Vital signs:  Temp: 98.2  F (36.8  C) Temp src: Oral BP: 161/48   Heart Rate: 61 Resp: 10 SpO2: 97 % O2 Device: Nasal cannula Oxygen Delivery: 3 LPM Height: 170.2 cm (5' 7\") Weight: 90.3 kg (199 lb 1.2 oz)  Estimated body mass index is 31.18 kg/(m^2) as calculated from the following:    Height as of this encounter: 1.702 m (5' 7\").    Weight as of this encounter: 90.3 kg (199 lb 1.2 oz).    General: Alert and oriented no acute distress interacting appropriately  Resp: Nonlabored breathing on 3 L nasal cannula end-tidal CO2 within normal limits  Abdomen: Incision looks good, no erythema, Dermabond intact.    Assessment and plan  83-year-old with ascending colon adenocarcinoma, now postop day 0 status post laparoscopic right hemicolectomy with extensive lysis of adhesions.  Doing well immediate postop, very interactive with great sense of humor, family at bedside.    Pain control with scheduled Tylenol, Neurontin, and as needed Dilaudid oxycodone and tramadol   Hemodynamically stable, continue incentive spirometry for pulmonary toilet.  Okay for clears, monitor for nausea/vomiting and back down appropriately, antiemetics also on board.  Please chart I's and O's, monitor urine output.  Up ad leticia. with assist  We will follow up on a.m. labs, restart all appropriate home meds, and DVT ppx    Niko Morejon MD (PGY1)  General Surgery  [phone]  "

## 2018-03-21 NOTE — ANESTHESIA PROCEDURE NOTES
Peripheral Nerve Block Procedure Note    Staff:     Anesthesiologist:  YOLIS DWYER    Resident/CRNA:  LUIS JIMENEZ    Block performed by resident/CRNA in the presence of a teaching physician    Location: Pre-op  Procedure Start/Stop TImes:      3/21/2018 7:10 AM     3/21/2018 7:15 AM    patient identified, IV checked, site marked, risks and benefits discussed, informed consent, monitors and equipment checked, pre-op evaluation, at physician/surgeon's request and post-op pain management      Correct Patient: Yes      Correct Position: Yes      Correct Site: Yes      Correct Procedure: Yes      Correct Laterality:  Yes    Site Marked:  Yes  Procedure details:     Procedure:  TAP    ASA:  3    Laterality:  Bilateral    Position:  Supine    Sterile Prep: chloraprep      Needle:  Insulated    Needle gauge:  21    Needle length (mm):  110    Ultrasound: Yes      Ultrasound used to identify targeted nerve, plexus, or vascular structure and placed a needle adjacent to it      Permanent Image entered into patiient's record      Abnormal pain on injection: No      Blood Aspirated: No      Paresthesias:  No    Bleeding at site: No      Test dose negative for signs of intravascular injection: Yes      Bolus via:  Needle    Infusion Method:  Single Shot    Complications:  None  Assessment/Narrative:     Injection made incrementally with aspirations every (mL):  5

## 2018-03-21 NOTE — IP AVS SNAPSHOT
MRN:[id number]                      After Visit Summary   3/21/2018    Pranav Jackson    MRN: [id number]           Thank you!     Thank you for choosing [facility] for your care. Our goal is always to provide you with excellent care. Hearing back from our patients is one way we can continue to improve our services. Please take a few minutes to complete the written survey that you may receive in the mail after you visit with us. Thank you!        Patient Information     Date Of Birth          11/28/1934        Designated Caregiver       Most Recent Value    Caregiver    Will someone help with your care after discharge? yes    Name of designated caregiver Jack Mendoza    Phone number of caregiver see facesheet    Caregiver address see facesheet      About your hospital stay     You were admitted on:  March 21, 2018 You last received care in the:  [address] [facility]    You were discharged on:  March 24, 2018        Reason for your hospital stay       You underwent:  Laparoscopic right hemicolectomy, extensive lysis of adhesions (greater than 1 hour).                  Who to Call     For medical emergencies, please call 911.  For non-urgent questions about your medical care, please call your primary care provider or clinic, [phone]  For questions related to your surgery, please call your surgery clinic        Attending Provider     Provider Specialty    Brandin Ibarra MD Colon and Rectal Surgery       Primary Care Provider Office Phone # Fax #    Isacc VACA Madiamond [phone]      After Care Instructions     Activity       Your activity upon discharge:   -No lifting, pushing, pulling greater than 10 lbs and no strenuous exercise for 6 weeks   -No driving while on narcotic analgesics (i.e. Percocet, oxycodone, Vicodin)  -No driving until you are able to fully twist to both sides or slam on brakes quickly and without any pain            Diet       Follow this diet upon  discharge:   -Low Residue Diet for at least 4-6 weeks unless cleared by Colorectal surgery.  No raw vegetables, fruit skins, fibrous foods that require a lot of chewing, nuts, seeds, corn, popcorn.   -We recommend eating slowly, chewing thoroughly, eating small frequent meals throughout the day  -Stay well hydrated.                  Follow-up Appointments     Adult [facility] Follow-up and recommended labs and tests       DIET  -Low Residue Diet for at least 4-6 weeks unless cleared by Colorectal surgery.  No raw vegetables, fruit skins, fibrous foods that require a lot of chewing, nuts, seeds, corn, popcorn.   -We recommend eating slowly, chewing thoroughly, eating small frequent meals throughout the day  -Stay well hydrated.      ACTIVITY  -No lifting, pushing, pulling greater than 10 lbs and no strenuous exercise for 6 weeks   -No driving while on narcotic analgesics (i.e. Percocet, oxycodone, Vicodin)  -No driving until you are able to fully twist to both sides or slam on brakes quickly and without any pain    WOUND CLINIC  -Inspect your wounds daily for signs of infection (increased redness, drainage, pain)  -Keep your wound clean and dry  -You may shower, but do not soak in tub or pool    NOTIFY  Please contact Jordi Marcus LPN, Yakelin Cisneros RN or Karishma Wilde RN at [phone] for problems after discharge such as:  -Temperature > 101F, chills, rigors, dizziness  -Redness around or purulent drainage from wound  -Inability to tolerate diet, nausea or vomiting  -You stop passing gas, develop significant bloating, abdominal pain  -Have blood in stools/vomit  -Have severe diarrhea/constipation  -Any other questions or concerns.  - At nights (after 4:30pm), on weekends, or if urgent, call [phone] and ask the  to speak with the on-call Colorectal Surgery resident or fellow      Medication Instructions  Some of your medications may have changed. Please take only prescribed and resumed medications      FOLLOW-UP  1.  You will need to follow-up with Emily Solorio NP in the Colon and Rectal Surgery clinic in 1 week(s) and then with CRS Staff: Dr. Ibarra in 2-3 weeks after.  Please contact our clinic scheduler Renita Beauchamp (phone # [phone]) if you have not heard from our clinic in 3 business days afer discharge to schedule a follow-up appointment.     2.  Follow up with your primary care provider in 1-2 weeks after discharge from the hospital to review this hospitalization.     3.  Please taper off pain medications over the next week.    4.  Please hold aspirin for one week after discharge.      Appointments on [facility] and/or [facility] (with [facility] or [facility] provider or service). Call [phone] if you haven't heard regarding these appointments within 7 days of discharge.                  Your next 10 appointments already scheduled     Jun 07, 2018 12:30 PM CDT   US CAROTID BILATERAL with UCUSV2   [facility] ([facility])    [address] [place] [address]  [address] [facility] [address] [phone]           Please bring a list of your medicines (including vitamins, minerals and over-the-counter drugs). Also, tell your doctor about any allergies you may have. Wear comfortable clothes and leave your valuables at home.  You do not need to do anything special to prepare for your exam.  Please call the Imaging Department at your exam site with any questions.            Jun 07, 2018  1:45 PM CDT   (Arrive by 1:30 PM)   Return Vascular Visit with Allyn Walters MD   [facility] ([facility])    [address] [place] [address]  3rd Floor  [facility] [address] [phone]              Pending Results     Date and Time Order Name Status Description    3/21/2018 1044 Surgical pathology exam In process             Statement of Approval     Ordered          03/24/18 1124  I have reviewed and agree with all the recommendations  "and orders detailed in this document.  EFFECTIVE NOW     Approved and electronically signed by:  Bryan Moreojn MD             Admission Information     Date & Time Provider Department Dept. Phone    3/21/2018 Brandin Ibarra MD [address] [facility] [phone]      Your Vitals Were     Blood Pressure Pulse Temperature Respirations Height Weight    159/61 (BP Location: Left arm) 62 97.3  F (36.3  C) (Oral) 18 1.702 m (5' 7\") 89.1 kg (196 lb 6.4 oz)    Pulse Oximetry BMI (Body Mass Index)                96% 30.76 kg/m2          MyChart Information     [employer] lets you send messages to your doctor, view your test results, renew your prescriptions, schedule appointments and more. To sign up, go to [email or web address] . Click on \"Log in\" on the left side of the screen, which will take you to the Welcome page. Then click on \"Sign up Now\" on the right side of the page.     You will be asked to enter the access code listed below, as well as some personal information. Please follow the directions to create your username and password.     Your access code is: [email or web address]  Expires: 2018  7:30 AM     Your access code will  in 90 days. If you need help or a new code, please call your [facility] clinic or [phone].        Care EveryWhere ID     This is your Care EveryWhere ID. This could be used by other organizations to access your [facility] medical records  [id number]        Equal Access to Services     [facility] AH: Hadii luanne ku hadasho Soomaali, waaxda luqadaha, qaybta kaalmada adeegyada, jana cohen. So [facility] [phone].    ATENCIÓN: Si habla español, tiene a stoner disposición servicios gratuitos de asistencia lingüística. Llame al [phone].    We comply with applicable federal civil rights laws and [place] laws. We do not discriminate on the basis of race, color, national origin, age, disability, sex, sexual orientation, or gender identity.               Review of " your medicines      START taking        Dose / Directions    enoxaparin 40 MG/0.4ML injection   Commonly known as:  LOVENOX   Used for:  S/P right hemicolectomy        Dose:  40 mg   Inject 0.4 mLs (40 mg) Subcutaneous every 24 hours for 26 days   Quantity:  10.4 mL   Refills:  0       ibuprofen 400 MG tablet   Commonly known as:  ADVIL/MOTRIN   Used for:  S/P right hemicolectomy        Dose:  400-800 mg   Take 1-2 tablets (400-800 mg) by mouth 3 times daily As scheduled for the next 5-7 days, then decrease both dose and frequency to as needed.   Quantity:  50 tablet   Refills:  0       oxyCODONE IR 5 MG tablet   Commonly known as:  ROXICODONE   Used for:  S/P right hemicolectomy        Dose:  5 mg   Take 1 tablet (5 mg) by mouth every 6 hours as needed for severe pain   Quantity:  28 tablet   Refills:  0       traMADol 50 MG tablet   Commonly known as:  ULTRAM   Used for:  S/P right hemicolectomy        Dose:  25-50 mg   Take 0.5-1 tablets (25-50 mg) by mouth every 6 hours as needed for moderate pain   Quantity:  28 tablet   Refills:  0         CONTINUE these medicines which may have CHANGED, or have new prescriptions. If we are uncertain of the size of tablets/capsules you have at home, strength may be listed as something that might have changed.        Dose / Directions    acetaminophen 500 MG tablet   Commonly known as:  TYLENOL   This may have changed:    - medication strength  - when to take this  - additional instructions   Used for:  S/P right hemicolectomy        Dose:  1000 mg   Take 2 tablets (1,000 mg) by mouth 4 times daily As scheduled for the next 5-7 days, then decrease both dose and frequency to as needed.   Quantity:  50 tablet   Refills:  0         CONTINUE these medicines which have NOT CHANGED        Dose / Directions    citalopram 40 MG tablet   Commonly known as:  celeXA        Dose:  40 mg   Take 40 mg by mouth every morning   Refills:  0       gabapentin 300 MG capsule   Commonly known as:   NEURONTIN        Dose:  300 mg   Take 300 mg by mouth every morning   Refills:  0       IRON SUPPLEMENT PO        Dose:  65 mg   Take 65 mg by mouth 2 times daily (with meals)   Refills:  0       lisinopril-hydrochlorothiazide 20-12.5 MG per tablet   Commonly known as:  PRINZIDE/ZESTORETIC        TAKE ONE TABLET BY MOUTH ONCE DAILY IN THE MORNING   Refills:  0       metFORMIN 1000 MG tablet   Commonly known as:  GLUCOPHAGE        Dose:  1000 mg   Take 1,000 mg by mouth 2 times daily (with meals)   Refills:  0       MULTIVITAMIN ADULT PO        Take by mouth every morning   Refills:  0       omeprazole 20 MG CR capsule   Commonly known as:  priLOSEC        Dose:  20 mg   Take 20 mg by mouth every morning   Refills:  0       simvastatin 20 MG tablet   Commonly known as:  ZOCOR        Dose:  20 mg   Take 20 mg by mouth At Bedtime   Refills:  0       TRULICITY SC        Inject Subcutaneous once a week WED MORNING   Refills:  0         STOP taking     aspirin 325 MG tablet           neomycin 500 MG tablet   Commonly known as:  MYCIFRADIN           ondansetron 4 MG tablet   Commonly known as:  ZOFRAN           polyethylene glycol 236 G suspension   Commonly known as:  GoLYTELY/NuLYTELY                Where to get your medicines      These medications were sent to [facility], [place] - [address]  [address] [facility] [address]     Phone:  [phone]     acetaminophen 500 MG tablet    enoxaparin 40 MG/0.4ML injection    ibuprofen 400 MG tablet         Some of these will need a paper prescription and others can be bought over the counter. Ask your nurse if you have questions.     Bring a paper prescription for each of these medications     oxyCODONE IR 5 MG tablet    traMADol 50 MG tablet                Protect others around you: Learn how to safely use, store and throw away your medicines at www.disposemymeds.org.        Information about OPIOIDS     PRESCRIPTION OPIOIDS:  WHAT YOU NEED TO KNOW    Prescription opioids can be used to help relieve moderate to severe pain and are often prescribed following a surgery or injury, or for certain health conditions. These medications can be an important part of treatment but also come with serious risks. It is important to work with your health care provider to make sure you are getting the safest, most effective care.    WHAT ARE THE RISKS AND SIDE EFFECTS OF OPIOID USE?  Prescription opioids carry serious risks of addiction and overdose, especially with prolonged use. An opioid overdose, often marked by slowed breathing can cause sudden death. The use of prescription opioids can have a number of side effects as well, even when taken as directed:      Tolerance - meaning you might need to take more of a medication for the same pain relief    Physical dependence - meaning you have symptoms of withdrawal when a medication is stopped    Increased sensitivity to pain    Constipation    Nausea, vomiting, and dry mouth    Sleepiness and dizziness    Confusion    Depression    Low levels of testosterone that can result in lower sex drive, energy, and strength    Itching and sweating    RISKS ARE GREATER WITH:    History of drug misuse, substance use disorder, or overdose    Mental health conditions (such as depression or anxiety)    Sleep apnea    Older age (65 years or older)    Pregnancy    Avoid alcohol while taking prescription opioids.   Also, unless specifically advised by your health care provider, medications to avoid include:    Benzodiazepines (such as Xanax or Valium)    Muscle relaxants (such as Soma or Flexeril)    Hypnotics (such as Ambien or Lunesta)    Other prescription opioids    KNOW YOUR OPTIONS:  Talk to your health care provider about ways to manage your pain that do not involve prescription opioids. Some of these options may actually work better and have fewer risks and side effects:    Pain relievers such as acetaminophen,  ibuprofen, and naproxen    Some medications that are also used for depression or seizures    Physical therapy and exercise    Cognitive behavioral therapy, a psychological, goal-directed approach, in which patients learn how to modify physical, behavioral, and emotional triggers of pain and stress    IF YOU ARE PRESCRIBED OPIOIDS FOR PAIN:    Never take opioids in greater amounts or more often than prescribed    Follow up with your primary health care provider and work together to create a plan on how to manage your pain.    Talk about ways to help manage your pain that do not involve prescription opioids    Talk about all concerns and side effects    Help prevent misuse and abuse    Never sell or share prescription opioids    Never use another person's prescription opioids    Store prescription opioids in a secure place and out of reach of others (this may include visitors, children, friends, and family)    Visit www.cdc.gov/drugoverdose to learn about risks of opioid abuse and overdose    If you believe you may be struggling with addiction, tell your health care provider and ask for guidance or call [facility]'s National Helpline at 0-190-775-HELP    LEARN MORE / www.cdc.gov/drugoverdose/prescribing/guideline.html    Safely dispose of unused prescription opioids: Find your local drug take-back programs and more information about the importance of safe disposal at www.doseofreality.[place].gov             Medication List: This is a list of all your medications and when to take them. Check marks below indicate your daily home schedule. Keep this list as a reference.      Medications           Morning Afternoon Evening Bedtime As Needed    acetaminophen 500 MG tablet   Commonly known as:  TYLENOL   Take 2 tablets (1,000 mg) by mouth 4 times daily As scheduled for the next 5-7 days, then decrease both dose and frequency to as needed.   Last time this was given:  1,000 mg on 3/24/2018  8:08 AM                                 citalopram 40 MG tablet   Commonly known as:  celeXA   Take 40 mg by mouth every morning   Last time this was given:  40 mg on 3/24/2018  8:08 AM                                enoxaparin 40 MG/0.4ML injection   Commonly known as:  LOVENOX   Inject 0.4 mLs (40 mg) Subcutaneous every 24 hours for 26 days   Last time this was given:  40 mg on 3/23/2018  1:12 PM                                gabapentin 300 MG capsule   Commonly known as:  NEURONTIN   Take 300 mg by mouth every morning   Last time this was given:  300 mg on 3/24/2018  8:08 AM                                ibuprofen 400 MG tablet   Commonly known as:  ADVIL/MOTRIN   Take 1-2 tablets (400-800 mg) by mouth 3 times daily As scheduled for the next 5-7 days, then decrease both dose and frequency to as needed.   Last time this was given:  800 mg on 3/24/2018  8:08 AM                                IRON SUPPLEMENT PO   Take 65 mg by mouth 2 times daily (with meals)                                lisinopril-hydrochlorothiazide 20-12.5 MG per tablet   Commonly known as:  PRINZIDE/ZESTORETIC   TAKE ONE TABLET BY MOUTH ONCE DAILY IN THE MORNING                                metFORMIN 1000 MG tablet   Commonly known as:  GLUCOPHAGE   Take 1,000 mg by mouth 2 times daily (with meals)                                MULTIVITAMIN ADULT PO   Take by mouth every morning                                omeprazole 20 MG CR capsule   Commonly known as:  priLOSEC   Take 20 mg by mouth every morning   Last time this was given:  20 mg on 3/24/2018  8:08 AM                                oxyCODONE IR 5 MG tablet   Commonly known as:  ROXICODONE   Take 1 tablet (5 mg) by mouth every 6 hours as needed for severe pain   Last time this was given:  5 mg on 3/23/2018  8:46 AM                                simvastatin 20 MG tablet   Commonly known as:  ZOCOR   Take 20 mg by mouth At Bedtime   Last time this was given:  20 mg on 3/23/2018  8:52 PM                                 traMADol 50 MG tablet   Commonly known as:  ULTRAM   Take 0.5-1 tablets (25-50 mg) by mouth every 6 hours as needed for moderate pain                                TRULICITY SC   Inject Subcutaneous once a week WED MORNING

## 2018-03-21 NOTE — ANESTHESIA CARE TRANSFER NOTE
Patient: Pranav Jackson    Procedure(s):  Laparoscopic Right Hemicolectomy, Extensive Lysis of Adhesions,, Anesthesia Block (ERAS Patient) - Wound Class: II-Clean Contaminated    Diagnosis: Malignant Neoplasm Of Ascending Colon   Diagnosis Additional Information: No value filed.    Anesthesia Type:   No value filed.     Note:  Airway :Face Mask  Patient transferred to:PACU  Comments: Pt alert, no c/o pain. VSS. Breathing spontaneously on 6L O2 via FM. Report shared with RN.Handoff Report: Identifed the Patient, Identified the Reponsible Provider, Reviewed the pertinent medical history, Discussed the surgical course, Reviewed Intra-OP anesthesia mangement and issues during anesthesia, Set expectations for post-procedure period and Allowed opportunity for questions and acknowledgement of understanding      Vitals: (Last set prior to Anesthesia Care Transfer)    CRNA VITALS  3/21/2018 1057 - 3/21/2018 1131      3/21/2018             Pulse: 66    ART BP: 156/50    ART Mean: 87    SpO2: 95 %    Resp Rate (set): 10                Electronically Signed By: PARTH Pederson CRNA  March 21, 2018  11:31 AM

## 2018-03-21 NOTE — OR NURSING
upon arrival to PACU.  3 units of sq novolog given per Dr. Weiner's orders.  BG recheck 212.  Dr. Weiner notified.  No further orders received.  Plan to send pt to .

## 2018-03-22 LAB
ANION GAP SERPL CALCULATED.3IONS-SCNC: 11 MMOL/L (ref 3–14)
BUN SERPL-MCNC: 14 MG/DL (ref 7–30)
CALCIUM SERPL-MCNC: 8.4 MG/DL (ref 8.5–10.1)
CHLORIDE SERPL-SCNC: 100 MMOL/L (ref 94–109)
CO2 SERPL-SCNC: 23 MMOL/L (ref 20–32)
CREAT SERPL-MCNC: 0.89 MG/DL (ref 0.66–1.25)
ERYTHROCYTE [DISTWIDTH] IN BLOOD BY AUTOMATED COUNT: 17.7 % (ref 10–15)
GFR SERPL CREATININE-BSD FRML MDRD: 81 ML/MIN/1.7M2
GLUCOSE BLDC GLUCOMTR-MCNC: 115 MG/DL (ref 70–99)
GLUCOSE BLDC GLUCOMTR-MCNC: 132 MG/DL (ref 70–99)
GLUCOSE BLDC GLUCOMTR-MCNC: 154 MG/DL (ref 70–99)
GLUCOSE BLDC GLUCOMTR-MCNC: 158 MG/DL (ref 70–99)
GLUCOSE BLDC GLUCOMTR-MCNC: 183 MG/DL (ref 70–99)
GLUCOSE SERPL-MCNC: 169 MG/DL (ref 70–99)
HCT VFR BLD AUTO: 35.3 % (ref 40–53)
HGB BLD-MCNC: 10.7 G/DL (ref 13.3–17.7)
MAGNESIUM SERPL-MCNC: 1.4 MG/DL (ref 1.6–2.3)
MAGNESIUM SERPL-MCNC: 2.2 MG/DL (ref 1.6–2.3)
MCH RBC QN AUTO: 28 PG (ref 26.5–33)
MCHC RBC AUTO-ENTMCNC: 30.3 G/DL (ref 31.5–36.5)
MCV RBC AUTO: 92 FL (ref 78–100)
PHOSPHATE SERPL-MCNC: 3.6 MG/DL (ref 2.5–4.5)
PLATELET # BLD AUTO: 338 10E9/L (ref 150–450)
POTASSIUM SERPL-SCNC: 4.1 MMOL/L (ref 3.4–5.3)
RBC # BLD AUTO: 3.82 10E12/L (ref 4.4–5.9)
SODIUM SERPL-SCNC: 134 MMOL/L (ref 133–144)
WBC # BLD AUTO: 12.7 10E9/L (ref 4–11)

## 2018-03-22 PROCEDURE — 00000146 ZZHCL STATISTIC GLUCOSE BY METER IP

## 2018-03-22 PROCEDURE — 85027 COMPLETE CBC AUTOMATED: CPT | Performed by: COLON & RECTAL SURGERY

## 2018-03-22 PROCEDURE — 25000128 H RX IP 250 OP 636: Performed by: STUDENT IN AN ORGANIZED HEALTH CARE EDUCATION/TRAINING PROGRAM

## 2018-03-22 PROCEDURE — 83735 ASSAY OF MAGNESIUM: CPT | Performed by: COLON & RECTAL SURGERY

## 2018-03-22 PROCEDURE — 25000128 H RX IP 250 OP 636: Performed by: SURGERY

## 2018-03-22 PROCEDURE — 25000132 ZZH RX MED GY IP 250 OP 250 PS 637: Mod: GY | Performed by: STUDENT IN AN ORGANIZED HEALTH CARE EDUCATION/TRAINING PROGRAM

## 2018-03-22 PROCEDURE — 25000132 ZZH RX MED GY IP 250 OP 250 PS 637: Mod: GY | Performed by: PHYSICIAN ASSISTANT

## 2018-03-22 PROCEDURE — 99231 SBSQ HOSP IP/OBS SF/LOW 25: CPT | Performed by: NURSE PRACTITIONER

## 2018-03-22 PROCEDURE — A9270 NON-COVERED ITEM OR SERVICE: HCPCS | Mod: GY | Performed by: SURGERY

## 2018-03-22 PROCEDURE — A9270 NON-COVERED ITEM OR SERVICE: HCPCS | Mod: GY | Performed by: STUDENT IN AN ORGANIZED HEALTH CARE EDUCATION/TRAINING PROGRAM

## 2018-03-22 PROCEDURE — 12000001 ZZH R&B MED SURG/OB UMMC

## 2018-03-22 PROCEDURE — 25000132 ZZH RX MED GY IP 250 OP 250 PS 637: Mod: GY | Performed by: SURGERY

## 2018-03-22 PROCEDURE — 84100 ASSAY OF PHOSPHORUS: CPT | Performed by: COLON & RECTAL SURGERY

## 2018-03-22 PROCEDURE — 36415 COLL VENOUS BLD VENIPUNCTURE: CPT | Performed by: COLON & RECTAL SURGERY

## 2018-03-22 PROCEDURE — A9270 NON-COVERED ITEM OR SERVICE: HCPCS | Mod: GY | Performed by: PHYSICIAN ASSISTANT

## 2018-03-22 PROCEDURE — 80048 BASIC METABOLIC PNL TOTAL CA: CPT | Performed by: COLON & RECTAL SURGERY

## 2018-03-22 RX ORDER — OXYCODONE HYDROCHLORIDE 5 MG/1
5-10 TABLET ORAL EVERY 4 HOURS PRN
Status: DISCONTINUED | OUTPATIENT
Start: 2018-03-22 | End: 2018-03-23

## 2018-03-22 RX ORDER — SIMVASTATIN 20 MG
20 TABLET ORAL AT BEDTIME
Status: DISCONTINUED | OUTPATIENT
Start: 2018-03-22 | End: 2018-03-24 | Stop reason: HOSPADM

## 2018-03-22 RX ORDER — MAGNESIUM SULFATE HEPTAHYDRATE 40 MG/ML
4 INJECTION, SOLUTION INTRAVENOUS ONCE
Status: COMPLETED | OUTPATIENT
Start: 2018-03-22 | End: 2018-03-22

## 2018-03-22 RX ADMIN — SIMVASTATIN 20 MG: 20 TABLET, FILM COATED ORAL at 21:54

## 2018-03-22 RX ADMIN — OXYCODONE HYDROCHLORIDE 2.5 MG: 5 TABLET ORAL at 00:08

## 2018-03-22 RX ADMIN — ACETAMINOPHEN 975 MG: 325 TABLET, FILM COATED ORAL at 21:54

## 2018-03-22 RX ADMIN — INSULIN ASPART 1 UNITS: 100 INJECTION, SOLUTION INTRAVENOUS; SUBCUTANEOUS at 21:57

## 2018-03-22 RX ADMIN — OMEPRAZOLE 20 MG: 20 CAPSULE, DELAYED RELEASE ORAL at 07:47

## 2018-03-22 RX ADMIN — ENOXAPARIN SODIUM 40 MG: 40 INJECTION SUBCUTANEOUS at 13:11

## 2018-03-22 RX ADMIN — OXYCODONE HYDROCHLORIDE 5 MG: 5 TABLET ORAL at 13:11

## 2018-03-22 RX ADMIN — OXYCODONE HYDROCHLORIDE 10 MG: 5 TABLET ORAL at 22:03

## 2018-03-22 RX ADMIN — ACETAMINOPHEN 975 MG: 325 TABLET, FILM COATED ORAL at 13:11

## 2018-03-22 RX ADMIN — OXYCODONE HYDROCHLORIDE 5 MG: 5 TABLET ORAL at 03:06

## 2018-03-22 RX ADMIN — CITALOPRAM HYDROBROMIDE 40 MG: 20 TABLET ORAL at 07:47

## 2018-03-22 RX ADMIN — ACETAMINOPHEN 975 MG: 325 TABLET, FILM COATED ORAL at 05:07

## 2018-03-22 RX ADMIN — INSULIN ASPART 1 UNITS: 100 INJECTION, SOLUTION INTRAVENOUS; SUBCUTANEOUS at 00:02

## 2018-03-22 RX ADMIN — OXYCODONE HYDROCHLORIDE 5 MG: 5 TABLET ORAL at 14:05

## 2018-03-22 RX ADMIN — INSULIN ASPART 1 UNITS: 100 INJECTION, SOLUTION INTRAVENOUS; SUBCUTANEOUS at 05:07

## 2018-03-22 RX ADMIN — ASPIRIN 81 MG CHEWABLE TABLET 81 MG: 81 TABLET CHEWABLE at 07:47

## 2018-03-22 RX ADMIN — GABAPENTIN 300 MG: 300 CAPSULE ORAL at 07:47

## 2018-03-22 RX ADMIN — INSULIN ASPART 1 UNITS: 100 INJECTION, SOLUTION INTRAVENOUS; SUBCUTANEOUS at 10:00

## 2018-03-22 RX ADMIN — SODIUM CHLORIDE, POTASSIUM CHLORIDE, SODIUM LACTATE AND CALCIUM CHLORIDE: 600; 310; 30; 20 INJECTION, SOLUTION INTRAVENOUS at 01:52

## 2018-03-22 RX ADMIN — OXYCODONE HYDROCHLORIDE 5 MG: 5 TABLET ORAL at 07:47

## 2018-03-22 RX ADMIN — Medication 0.4 MG: at 05:12

## 2018-03-22 RX ADMIN — OXYCODONE HYDROCHLORIDE 10 MG: 5 TABLET ORAL at 17:22

## 2018-03-22 RX ADMIN — MAGNESIUM SULFATE IN WATER 4 G: 40 INJECTION, SOLUTION INTRAVENOUS at 17:25

## 2018-03-22 ASSESSMENT — PAIN DESCRIPTION - DESCRIPTORS
DESCRIPTORS: SORE
DESCRIPTORS: SORE
DESCRIPTORS: ACHING
DESCRIPTORS: SORE
DESCRIPTORS: ACHING
DESCRIPTORS: SORE

## 2018-03-22 NOTE — PROGRESS NOTES
"Subjective:  No acute event overnight, doing very well, positive flatus but no BM, tolerated clears w/o N/V, requiring 2 L nasal cannula to sat above 92% but denies shortness of breath, chest pain, fever, chills, nausea or vomiting.    Objective:  /51 (BP Location: Left arm)  Pulse 61  Temp 97.6  F (36.4  C) (Oral)  Resp 27  Ht 1.702 m (5' 7\")  Wt 90.3 kg (199 lb 1.2 oz)  SpO2 97%  BMI 31.18 kg/m2    General: Alert and oriented, no distress, interacting appropriately.  Resp: Breathing is nonlabored, on 3 L nasal cannula  Abdo: Mildly distended, appropriately tender.  Incision looks good no redness, no fluctuance or purulence, Dermabond intact.    Intake/Output Summary (Last 24 hours) at 18 0747  Last data filed at 18 0659   Gross per 24 hour   Intake          3781.25 ml   Output             1815 ml   Net          1966.25 ml     Labs: Pending    Assessment and plan  83-year-old with ascending colon adenocarcinoma, now postop day 1 status post right hemicolectomy with extensive lysis of adhesions. Patient is doing very well, he has antegrade function with flatus, tolerated clears overnight without nausea or vomiting.    Continue pain and anxiety control with scheduled Tylenol, Neurontin, Celexa, and as needed Dilaudid IV, po. Oxycodone and tramadol   Hemodynamically stable, continue aspirin 81 and statin, keep holding other PTA BP meds  Can start full liquid diet today, monitoring for nausea/vomiting and back done appropriately, anti-emetics prn.  RONN Lepe today, continue to monitor I's and O's.  Upper ad leticia, encourage ambulation and out of bed today.    DVT ppx: pending labs this a.m.  Dispo: Pending return of bowel function and optimization.    Niko Morejon MD (PGY1)  General Surgery  [phone]  "

## 2018-03-22 NOTE — PROGRESS NOTES
"REGIONAL ANESTHESIA PAIN SERVICE  SUBJECTIVE  Interval history: Patient reports no pain at rest and abdominal soreness with twisting movement and cough, and he can tell skin is numb because he can't feel the insulin SC injections he's been getting.  States oxycodone helped, last given 4 hours ago.  Tolerating full liquid diet,  denies nausea.  Denies any weakness, paresthesias, circumoral numbness, metallic taste or tinnitus.  Lepe removed this AM.     Clinically Aligned Pain Assessment (CAPA):  Comfort (How is your pain?): Tolerable with discomfort  Change in Pain (Since your last medication/intervention?): About the same  Pain Control (How are your pain treatments working?):  Partially effective pain control    Medications related to Pain Management (Future)    Start     Dose/Rate Route Frequency Ordered Stop    03/25/18 0000  lidocaine 1 % 1 mL      1 mL Other EVERY 1 HOUR PRN 03/21/18 1333      03/22/18 1114  oxyCODONE IR (ROXICODONE) tablet 5-10 mg      5-10 mg Oral EVERY 4 HOURS PRN 03/22/18 1114      03/22/18 0800  gabapentin (NEURONTIN) capsule 300 mg      300 mg Oral EVERY MORNING 03/21/18 1333      03/21/18 1353  traMADol (ULTRAM) half-tab 25-50 mg      25-50 mg Oral EVERY 6 HOURS PRN 03/21/18 1353      03/21/18 1345  aspirin chewable tablet 81 mg      81 mg Oral DAILY 03/21/18 1333      03/21/18 1345  acetaminophen (TYLENOL) tablet 975 mg      975 mg Oral EVERY 8 HOURS 03/21/18 1333      03/21/18 1333  bupivacaine liposome (EXPAREL) LONG ACTING injection was administered into the infiltration site to produce postsurgical analgesia. Duration of action is up to 72 hours, and other \"yareli\" medications should not be given for 96 hours with the exception of the lidocaine 5% patch (LIDODERM) and the lidocaine 10mg in potassium infusions. This entry is for INFORMATION ONLY.       Does not apply CONTINUOUS PRN 03/21/18 1333 03/25/18 1332    03/21/18 1333  lidocaine (LMX4) kit       Topical EVERY 1 HOUR PRN " "03/21/18 1333      03/21/18 1333  HYDROmorphone (DILAUDID) injection 0.2-0.4 mg      0.2-0.4 mg Intravenous EVERY 2 HOURS PRN 03/21/18 1333            OBJECTIVE:    /50 (BP Location: Left arm)  Pulse 61  Temp 99.1  F (37.3  C) (Oral)  Resp 20  Ht 1.702 m (5' 7\")  Wt 89.9 kg (198 lb 3.2 oz)  SpO2 92%  BMI 31.04 kg/m2  Exam:  General: alert and no distress  Neuro: Strength 5/5 and grossly symmetric BLE     ASSESSMENT/PLAN:    Pranav Jackson is a 83 year old male with malignant neoplasm of ascending colon, now POD #1 s/p  LAPAROSCOPIC ASSISTED COLECTOMY with single shot injection bilateral transversus abdominis plane (TAP) nerve block.  Total bupivacaine 0.25% 20 mL, then liposomal (long-acting) bupivacaine (Exparel) 1.3% 20mL administered 3/21/18 for postop pain control.  Pt is ambulating with walker without difficulty.  No weakness or paresthesias.  No evidence of adverse side effects associated with nerve block injections.  Pt acheiving adequate pain control, with nerve block and oral analgesics.  Anticipate up to 72 hours of pain control with long-acting local anesthetic. Additionally, pt will continue to require multimodal analgesia for visceral/muscle/musculoskeletal pain not controlled with long-acting local anesthetic.      - NO other local anesthetic use within 96 hours of liposomal bupivacaine (Exparel)  - patient received verbal and written instructions about liposomal bupivacaine and counseling about pharmacologic and nonpharmacologic measures for acute postoperative pain management  - please call RAPS if questions or concerns    [facility]  Regional Anesthesia Pain Service (RAPS)  3/22/2018 1:55 PM    [facility] Contact Info (for in-house use only):  Job code ID: [id number]   [facility] 0599  [facility] [address]  [employer] phone: dial [phone], enter jobcode ID, then enter call-back number.    Text: Use [employer]COM on the Intranet <Paging/Directory> tab and enter Jobcode ID.   If no call back at any " time, contact the hospital  and ask for RAPS attending or backup

## 2018-03-22 NOTE — PLAN OF CARE
Problem: Patient Care Overview  Goal: Plan of Care/Patient Progress Review  Outcome: Improving  VSS; has a hx of HTN and bradycardic at times. Has history of ESBL in urine from prior UTI so was moved to private room and placed on contact precautions. Pt c/o pain controlled using PRN and scheduled analgesics. Pt tolerating FULL liquids well; denies N/V. UOP adequate- conley pulled at 0945 so due to void bu 1600. +gas/small loose BM. PIV to right hand removed, right forearm SL. Up with SBA and walker. Adult children present- were very supportive. PLAN: continue POC     Pt ok with bedside report if awake.

## 2018-03-22 NOTE — PLAN OF CARE
Problem: Patient Care Overview  Goal: Plan of Care/Patient Progress Review  Outcome: Improving  POD 1. AVSS. Capnography in place, can be d/c at 1300. Pt reports abdominal pain. Given 2.5-5 mg of oxycodone q 4hrs, 0.4 mg of IV Dilaudid x1, scheduled tylenol, and a heating pack. Pt reports improvement in pain control. Denies nausea, on a clear liquid diet. Passing flatus but no BM yet. Conley with adequate urine volumes, conley to stay in til POD 2. Lap sites are c/d/i. Dangled on evenings, up with assist of one. Continue to monitor pain control, GI/ function and encourage OOB activity.

## 2018-03-22 NOTE — PLAN OF CARE
Problem: Patient Care Overview  Goal: Plan of Care/Patient Progress Review  Outcome: Improving  POD#0 s/p lap right hemicolectomy and lysis of adhesions. AVSS, on 2L nasal cannula. Capnography monitoring in progress. Pain managed well with PRN IV Dilaudid and scheduled Tylenol. Clear liquid diet, tolerating sips of water and meds, denies nausea. Pt declined to try other clears this evening. BG checks q4hrs, SSI given. Midline and laps x3 with dermabond, CDI CHRIS. Bowel sounds hypoactive, denies flatus, no BM. Lepe with adequate UOP. PIV infusing MIVF. Dangled with 2-assist, tolerated well, sat at bedside for a while. Lozenge given for sore throat. Using IS with encouragement. PCDs on. Son at bedside, attentive and supportive. Continue with POC.      Addendum: 2.5mg Oxycodone given, effective.

## 2018-03-22 NOTE — PROGRESS NOTES
CLINICAL NUTRITION SERVICES  Reason for Assessment:  Nutrition education regarding low fiber diet education   Diet History:  Patient reports to having no history of low fiber diet education.  Nutrition Diagnosis:  Food- and nutrition-related knowledge deficit r/t no previous knowledge of low fiber diet as evidenced by patient report  Interventions:    Nutrition Education (Content):  a) Provided handout Low Fiber Nutrition Therapy and Low Fiber Diet Hospital Menu  b) Discussed each food group and foods to eat and avoid.     Nutrition Education (Application):  a) Discussed eating habits and recommended alternative food choices  Goals:     Patient verbalizes understanding of diet by asking appropriate questions based on current diet and how to make modifications to meet low fiber.  Seemed to gain an understanding of what made grains low fiber - white breads, taking skins off potatoes, refined foods. Was able to decipher food group items that were low fiber without being told: canned peaches and pears without skins, pancakes (that weren't whole grain), tender/well chewed meats, etc.    All the above goals met during education session    Follow-up:    Patient to ask any further nutrition-related questions before discharge.  In addition, pt may request outpatient RD appointment.  Will re-evaluate in 7 days, on sooner, if nutrition status changes    Nneka Summers, Dietetic Intern  I have read and agree with the above education note.   Luana Lee RD, LD   Pr

## 2018-03-23 ENCOUNTER — APPOINTMENT (OUTPATIENT)
Dept: OCCUPATIONAL THERAPY | Facility: CLINIC | Age: 83
DRG: 331 | End: 2018-03-23
Attending: PHYSICIAN ASSISTANT
Payer: MEDICARE

## 2018-03-23 LAB
GLUCOSE BLDC GLUCOMTR-MCNC: 128 MG/DL (ref 70–99)
GLUCOSE BLDC GLUCOMTR-MCNC: 135 MG/DL (ref 70–99)
GLUCOSE BLDC GLUCOMTR-MCNC: 156 MG/DL (ref 70–99)
GLUCOSE BLDC GLUCOMTR-MCNC: 181 MG/DL (ref 70–99)
GLUCOSE BLDC GLUCOMTR-MCNC: 184 MG/DL (ref 70–99)
HGB BLD-MCNC: 10.5 G/DL (ref 13.3–17.7)

## 2018-03-23 PROCEDURE — 97530 THERAPEUTIC ACTIVITIES: CPT | Mod: GO

## 2018-03-23 PROCEDURE — 00000146 ZZHCL STATISTIC GLUCOSE BY METER IP

## 2018-03-23 PROCEDURE — 85018 HEMOGLOBIN: CPT | Performed by: PHYSICIAN ASSISTANT

## 2018-03-23 PROCEDURE — 25000132 ZZH RX MED GY IP 250 OP 250 PS 637: Mod: GY | Performed by: STUDENT IN AN ORGANIZED HEALTH CARE EDUCATION/TRAINING PROGRAM

## 2018-03-23 PROCEDURE — 97165 OT EVAL LOW COMPLEX 30 MIN: CPT | Mod: GO

## 2018-03-23 PROCEDURE — 25000128 H RX IP 250 OP 636: Performed by: STUDENT IN AN ORGANIZED HEALTH CARE EDUCATION/TRAINING PROGRAM

## 2018-03-23 PROCEDURE — A9270 NON-COVERED ITEM OR SERVICE: HCPCS | Mod: GY | Performed by: STUDENT IN AN ORGANIZED HEALTH CARE EDUCATION/TRAINING PROGRAM

## 2018-03-23 PROCEDURE — 97535 SELF CARE MNGMENT TRAINING: CPT | Mod: GO

## 2018-03-23 PROCEDURE — A9270 NON-COVERED ITEM OR SERVICE: HCPCS | Mod: GY | Performed by: SURGERY

## 2018-03-23 PROCEDURE — 25000132 ZZH RX MED GY IP 250 OP 250 PS 637: Mod: GY | Performed by: COLON & RECTAL SURGERY

## 2018-03-23 PROCEDURE — 40000133 ZZH STATISTIC OT WARD VISIT

## 2018-03-23 PROCEDURE — 36415 COLL VENOUS BLD VENIPUNCTURE: CPT | Performed by: PHYSICIAN ASSISTANT

## 2018-03-23 PROCEDURE — A9270 NON-COVERED ITEM OR SERVICE: HCPCS | Mod: GY | Performed by: COLON & RECTAL SURGERY

## 2018-03-23 PROCEDURE — 12000001 ZZH R&B MED SURG/OB UMMC

## 2018-03-23 PROCEDURE — 25000132 ZZH RX MED GY IP 250 OP 250 PS 637: Mod: GY | Performed by: SURGERY

## 2018-03-23 RX ORDER — IBUPROFEN 400 MG/1
400-800 TABLET, FILM COATED ORAL 3 TIMES DAILY
Qty: 50 TABLET | Refills: 0 | Status: ON HOLD | OUTPATIENT
Start: 2018-03-23 | End: 2018-03-28

## 2018-03-23 RX ORDER — ACETAMINOPHEN 500 MG
1000 TABLET ORAL 4 TIMES DAILY
Qty: 50 TABLET | Refills: 0 | Status: SHIPPED | OUTPATIENT
Start: 2018-03-23

## 2018-03-23 RX ORDER — HYDRALAZINE HYDROCHLORIDE 20 MG/ML
10 INJECTION INTRAMUSCULAR; INTRAVENOUS EVERY 4 HOURS PRN
Status: DISCONTINUED | OUTPATIENT
Start: 2018-03-23 | End: 2018-03-24 | Stop reason: HOSPADM

## 2018-03-23 RX ORDER — OXYCODONE HYDROCHLORIDE 5 MG/1
5 TABLET ORAL EVERY 6 HOURS PRN
Qty: 28 TABLET | Refills: 0 | Status: ON HOLD | OUTPATIENT
Start: 2018-03-23 | End: 2018-03-28

## 2018-03-23 RX ORDER — ACETAMINOPHEN 500 MG
1000 TABLET ORAL 4 TIMES DAILY
Status: DISCONTINUED | OUTPATIENT
Start: 2018-03-23 | End: 2018-03-24 | Stop reason: HOSPADM

## 2018-03-23 RX ORDER — TRAMADOL HYDROCHLORIDE 50 MG/1
25-50 TABLET ORAL EVERY 6 HOURS PRN
Qty: 28 TABLET | Refills: 0 | Status: ON HOLD | OUTPATIENT
Start: 2018-03-23 | End: 2018-03-28

## 2018-03-23 RX ORDER — IBUPROFEN 800 MG/1
800 TABLET, FILM COATED ORAL 3 TIMES DAILY
Status: DISCONTINUED | OUTPATIENT
Start: 2018-03-23 | End: 2018-03-24 | Stop reason: HOSPADM

## 2018-03-23 RX ORDER — OXYCODONE HYDROCHLORIDE 5 MG/1
5 TABLET ORAL EVERY 4 HOURS PRN
Status: DISCONTINUED | OUTPATIENT
Start: 2018-03-23 | End: 2018-03-24 | Stop reason: HOSPADM

## 2018-03-23 RX ADMIN — SIMVASTATIN 20 MG: 20 TABLET, FILM COATED ORAL at 20:52

## 2018-03-23 RX ADMIN — ACETAMINOPHEN 1000 MG: 500 TABLET, FILM COATED ORAL at 16:11

## 2018-03-23 RX ADMIN — IBUPROFEN 800 MG: 800 TABLET ORAL at 14:02

## 2018-03-23 RX ADMIN — OMEPRAZOLE 20 MG: 20 CAPSULE, DELAYED RELEASE ORAL at 08:43

## 2018-03-23 RX ADMIN — IBUPROFEN 800 MG: 800 TABLET ORAL at 08:47

## 2018-03-23 RX ADMIN — INSULIN ASPART 1 UNITS: 100 INJECTION, SOLUTION INTRAVENOUS; SUBCUTANEOUS at 08:43

## 2018-03-23 RX ADMIN — CITALOPRAM HYDROBROMIDE 40 MG: 20 TABLET ORAL at 08:43

## 2018-03-23 RX ADMIN — INSULIN ASPART 1 UNITS: 100 INJECTION, SOLUTION INTRAVENOUS; SUBCUTANEOUS at 00:37

## 2018-03-23 RX ADMIN — ENOXAPARIN SODIUM 40 MG: 40 INJECTION SUBCUTANEOUS at 13:12

## 2018-03-23 RX ADMIN — ACETAMINOPHEN 1000 MG: 500 TABLET, FILM COATED ORAL at 20:53

## 2018-03-23 RX ADMIN — ASPIRIN 81 MG CHEWABLE TABLET 81 MG: 81 TABLET CHEWABLE at 08:43

## 2018-03-23 RX ADMIN — ACETAMINOPHEN 975 MG: 325 TABLET, FILM COATED ORAL at 04:51

## 2018-03-23 RX ADMIN — GABAPENTIN 300 MG: 300 CAPSULE ORAL at 08:43

## 2018-03-23 RX ADMIN — ACETAMINOPHEN 1000 MG: 500 TABLET, FILM COATED ORAL at 11:43

## 2018-03-23 RX ADMIN — INSULIN ASPART 1 UNITS: 100 INJECTION, SOLUTION INTRAVENOUS; SUBCUTANEOUS at 04:51

## 2018-03-23 RX ADMIN — IBUPROFEN 800 MG: 800 TABLET ORAL at 20:52

## 2018-03-23 RX ADMIN — OXYCODONE HYDROCHLORIDE 10 MG: 5 TABLET ORAL at 04:51

## 2018-03-23 RX ADMIN — OXYCODONE HYDROCHLORIDE 5 MG: 5 TABLET ORAL at 08:46

## 2018-03-23 ASSESSMENT — PAIN DESCRIPTION - DESCRIPTORS
DESCRIPTORS: ACHING
DESCRIPTORS: SORE
DESCRIPTORS: ACHING

## 2018-03-23 NOTE — PROGRESS NOTES
Colorectal Surgery Progress Note    2018  Pranav Jackson  MRN: [id number]    Subjective  Patient did well overnight with no acute events. Pain is well controlled with oral pain medication, which he has been taking every 4 hours.Continues to pass gas and had a bowel movement yesterday. Urine output has remained adequate. Patient has been up and ambulating without issue.Tolerating full liquid diet. No fevers, chills, shortness of breath, chest pain, nausea, or vomiting.    Patient was notably diagnosed with EBSL E. coli UTI yesterday based on tests from an outside facility.    Objective  Temp: 96.3  F (35.7  C) Temp  Min: 96.3  F (35.7  C)  Max: 99.1  F (37.3  C)  Resp: 17 Resp  Min: 17  Max: 27  SpO2: 97 % SpO2  Min: 92 %  Max: 97 %  Heart Rate: 70 Heart Rate  Min: 60  Max: 70  BP: 169/68 Systolic (24hrs), Av , Min:143 , Max:169   Diastolic (24hrs), Av, Min:47, Max:68    Constitutional: Patient resting comfortably in bed, no acute distress.  Pulmonary: Lungs clears to auscultation bilaterally with no wheezes, rales, or rhonchi. On 2L NC.  CV: RRR. Normal S1 and S2 with no murmurs, rubs, or gallops. Peripheral pulses are 2+ and symmetric.  Abdomen: Abdomen is soft with mild diffuse tenderness to palpation, no rebound or guarding. Surgical incisions are clean, dry, and intact with no surrounding erythema, fluctuance, or purulent drainage.     I/O  In: 34 mL/hr PO  Urine: 82 mL/hr  Stool: One unmeasured BM    Labs  Hgb pending    Assessment:  Pranav Jackson is an 83 year old male who is POD#2 from laparoscopic right hemicolectomy and lysis of adhesions done for invasive adenocarcinoma. Patient has been doing well and recovering without issue.     Plan:  Neuro:  - Goal to reduce opioid pain med dosing down from q4h to q6h  - Continue Tylenol and Neurontin  Pulm:  - Encourage incentive spirometry and deep breathing  - Supplemental O2 for sats < 93%  GI/FEN  - Advance to low residue diet     Activity: As  tolerated  VTE Prophylaxis: Lovenox  Dispo: Home likely tomorrow

## 2018-03-23 NOTE — PROGRESS NOTES
"Colorectal Surgery Progress Note  Pranav Jackson  [id number]  3/23/2018    No acute events overnight. Pain controlled with oral medications. Passing gas, +BM yesterday. Good UOP after conley removal. Ambulating. Tolerating diet without nausea/vomiting.     /68 (BP Location: Left arm)  Pulse 70  Temp 96.3  F (35.7  C) (Oral)  Resp 17  Ht 1.702 m (5' 7\")  Wt 89.9 kg (198 lb 3.2 oz)  SpO2 97%  BMI 31.04 kg/m2  NAD, comfortable appearing  Non-labored breathing on 2L NC  RRR  Abdomen soft, moderately distended, appropriately tender  Incisions C/D/I, no erythema or drainage  Ext warm, well perfused    I/O:  1 BM yesterday  2250 cc UOP yesterday. 475 since midnight  700 cc PO intake yesterday    Labs reviewed.    A/P: Pranav Jackson is a 83 year old male with ascending colon adenocarcinoma POD#2 after right hemicolectomy with extensive TALIA. Doing well post op.    -- Continue scheduled Tylenol, PRN oxycodone for pain control  -- Home gabapentin, citalopram, simvastatin, omeprazole, aspirin (81 mg, restart 325 mg on discharge)  -- Encourage cough, deep breathing, IS, ambulation  -- PRN hydralazine for BP control, holding lisinopril-hydrochlorothiazide  -- Advance to low residue diet  -- Enoxaparin  -- SSI  -- Likely discharge tomorrow    Seen and discussed with colorectal surgery fellow, Dr. Balderas.    Rohit Delgado MD  PGY-3, General Surgery  "

## 2018-03-23 NOTE — PLAN OF CARE
Problem: Surgery Nonspecified (Adult)  Goal: Signs and Symptoms of Listed Potential Problems Will be Absent, Minimized or Managed (Surgery Nonspecified)  Signs and symptoms of listed potential problems will be absent, minimized or managed by discharge/transition of care (reference Surgery Nonspecified (Adult) CPG).   Outcome: Improving  AVSS. Pt reports abdominal pain. Given 10 mg of oxycodone q 4hrs and scheduled tylenol. Pt reports improvement in pain control. Denies nausea, on a full liquid diet. Passing flatus and had  BM yesterday. Voiding adequate urine volumes.  Lap sites are c/d/i. Up with assist of one. Continue to monitor pain control, GI/ function and encourage OOB activity.

## 2018-03-23 NOTE — PLAN OF CARE
Problem: Patient Care Overview  Goal: Plan of Care/Patient Progress Review  PT 7C: Defer - PT orders acknowledged and appreciated. Following discussion with OT and chart review, pt with no acute IP PT needs at this time. Pt mobilizing near baseline with personal 4WW and has good insight into safe mobility. OT to follow and please refer to notes for discharge recommendation. Will complete orders at this time. Thank you for your referral. Please reorder with change in status.

## 2018-03-23 NOTE — PLAN OF CARE
Problem: Patient Care Overview  Goal: Plan of Care/Patient Progress Review  Outcome: Therapy, progress towards functional goals is fair  POD1 of laparoscopic right hemicolectomy. A&Ox4, VSS on 2 LPM NC O2 sats 95%. Pain is controlled with PO oxycodone, tylenol and IV dilaudid. Lap sites are CHRIS and CDI. Up SBA, voiding in the urinal, adequate UO, passing gas, BM loose today. PIV is SL. Mg++ replaced, recheck ordered. Ambulated in the halls x1, up to chair x1. Tolerating a FLD, poor appetite. Continue with post op care.     Pt. Does not need bedside report.

## 2018-03-23 NOTE — PLAN OF CARE
Problem: Patient Care Overview  Goal: Plan of Care/Patient Progress Review  OT 7C  Discharge Planner OT   Patient plan for discharge: home  Current status: Pt SBA for ~450 feet functional mobility with 4WW; limited by pain. On 1L O2 with activity and O2 sats remaining >94%. With cues for log roll technique, min A for sit > supine. Pt educated on abdominal precautions and implications for daily activities specific to patient. Able don shorts SBA within precautions given increased time.   Barriers to return to prior living situation: pain, abdominal precautions   Recommendations for discharge: Home with assist for heavy IADLs and ADLs as needed  Rationale for recommendations: Per son, family to stay with pt for ~1 week at discharge. Pt also reports next door neighbor extremely helpful with I/ADLs.        Entered by: Nneka Irvin 03/23/2018 9:23 AM

## 2018-03-23 NOTE — PROGRESS NOTES
" 03/23/18 0881   Quick Adds   Type of Visit Initial Occupational Therapy Evaluation   Living Environment   Lives With alone   Living Arrangements apartment   Home Accessibility tub/shower is not walk in;grab bars present (bathtub);grab bars present (toilet)   Number of Stairs to Enter Home 0   Number of Stairs Within Home 0   Transportation Available family or friend will provide   Living Environment Comment Lives on first floor of apartment complex. Has a neighbor that assists with cooking, cleaning, laundry, med management, and driving.     Self-Care   Dominant Hand right   Usual Activity Tolerance moderate   Current Activity Tolerance fair   Regular Exercise yes   Activity/Exercise Type other (see comments)  (Leg exercises)   Exercise Amount/Frequency daily   Equipment Currently Used at Home grab bar;walker, rolling;shower chair  (Life alert, reacher)   Activity/Exercise/Self-Care Comment Pt uses 4WW for all mobility. Greatly enjoys painting and going out to dinner.   Functional Level Prior   Ambulation 1-->assistive equipment  (4WW)   Transferring 1-->assistive equipment   Toileting 1-->assistive equipment   Bathing 1-->assistive equipment   Dressing 2-->assistive person   Eating 0-->independent   Communication 0-->understands/communicates without difficulty   Swallowing 0-->swallows foods/liquids without difficulty   Cognition 0 - no cognition issues reported   Fall history within last six months yes   Number of times patient has fallen within last six months 3  (Cub parking lot; low BP 2/2 recurrent UTIs)   Prior Functional Level Comment Mod Independent with ADLs with occasional exception of assist for sock and shoes from neighbor   General Information   Onset of Illness/Injury or Date of Surgery - Date 03/21/18   Referring Physician Demetra Bhatt PA-C   Patient/Family Goals Statement Return home   Additional Occupational Profile Info/Pertinent History of Current Problem \"83 year old male with " "ascending colon adenocarcinoma POD#2 after right hemicolectomy with extensive TALIA. \"   Precautions/Limitations fall precautions   Weight-Bearing Status - LUE partial weight-bearing (% in comments)  (<10#)   Weight-Bearing Status - RUE partial weight-bearing (% in comments)  (<10#)   Weight-Bearing Status - LLE full weight-bearing   Weight-Bearing Status - RLE full weight-bearing   General Info Comments Activity: ambulate with assist x4 daily   Cognitive Status Examination   Orientation orientation to person, place and time   Cognitive Comment Endorses short term memory problems.   Visual Perception   Visual Perception Wears glasses;No deficits were identified   Visual Perception Comments Glasses for reading   Sensory Examination   Sensory Comments Peripheral neuropathy in bilateral feet 2/2 diabetes   Integumentary/Edema   Integumentary/Edema no deficits were identifed   Range of Motion (ROM)   ROM Comment BUE ROM WFL   Strength   Strength Comments Not formally tested 2/2 abdominal precautions; appears WFL with activity   Coordination   Coordination Comments Pt's son reports increased tremors with painting, interfering with participation.    Mobility   Bed Mobility Bed mobility skill: Sit to supine   Bed Mobility Skill: Sit to Supine   Level of [place]: Sit/Supine minimum assist (75% patients effort)   Physical Assist/Nonphysical Assist: Sit/Supine verbal cues   Transfer Skill: Bed to Chair/Chair to Bed   Level of [place]: Bed to Chair stand-by assist   Assistive Device - Transfer Skill Bed to Chair Chair to Bed Rehab Eval rolling walker   Transfer Skill: Sit to Stand   Level of [place]: Sit/Stand stand-by assist   Lower Body Dressing   Level of [place]: Dress Lower Body stand-by assist   Physical Assist/Nonphysical Assist: Dress Lower Body verbal cues   Eating/Self Feeding   Level of [place]: Eating independent   Activities of Daily Living Analysis   Impairments Contributing to Impaired " "Activities of Daily Living post surgical precautions;strength decreased;pain   General Therapy Interventions   Planned Therapy Interventions ADL retraining;bed mobility training;transfer training;risk factor education;progressive activity/exercise;home program guidelines;strengthening   Clinical Impression   Criteria for Skilled Therapeutic Interventions Met yes, treatment indicated   OT Diagnosis decreased independence with ADLs   Influenced by the following impairments pain, post-op precautions, post-op deconditioning   Assessment of Occupational Performance 1-3 Performance Deficits   Identified Performance Deficits dressing, bathing, bed mobility   Clinical Decision Making (Complexity) Low complexity   Therapy Frequency daily   Predicted Duration of Therapy Intervention (days/wks) 3 days   Anticipated Discharge Disposition Home with Assist   Risks and Benefits of Treatment have been explained. Yes   Patient, Family & other staff in agreement with plan of care Yes   [facility] TM \"6 Clicks\"   2016, Trustees of [facility], under license to [employer].  All rights reserved.   6 Clicks Short Forms Daily Activity Inpatient Short Form   [facility]  \"6 Clicks\" Daily Activity Inpatient Short Form   1. Putting on and taking off regular lower body clothing? 3 - A Little   2. Bathing (including washing, rinsing, drying)? 3 - A Little   3. Toileting, which includes using toilet, bedpan or urinal? 4 - None   4. Putting on and taking off regular upper body clothing? 4 - None   5. Taking care of personal grooming such as brushing teeth? 4 - None   6. Eating meals? 4 - None   Daily Activity Raw Score (Score out of 24.Lower scores equate to lower levels of function) 22   Total Evaluation Time   Total Evaluation Time (Minutes) 10     "

## 2018-03-23 NOTE — PLAN OF CARE
Problem: Patient Care Overview  Goal: Plan of Care/Patient Progress Review  Outcome: Improving  POD2 right hemicolectomy with extensive lysis of adhesions for carcinoma of the ascending colon(per MD note).   VS: AVSS.   Neuro: Alert and oriented. Ak [place]-wears HA.  GI/: +BS, passing flatus, no stool this shift.   Diet/appetite: Pt tolerated LFD. Good po fluid intake as well. Insulin per s/s given for high BGs.  Activity: SBA in the hallways with walker, IS/CDB with encouragement.  Pain: Pain in abd controlled with prn oxycodone, scheduled tylenol and ibuprofen.  Skin: M/L and lap sites with intact derma bond, some bruising around incisions noted.  Lines: rt PIV sl'd.  Lovenox teaching done to pt's son, demonstrated knowledge of SQ injection- education material given.  P: continue to monitor VS, pain, bowel functions, incisions and gen status and continue with POC.

## 2018-03-23 NOTE — DISCHARGE SUMMARY
[facility]  Discharge Summary  Colon and Rectal Surgery     Pranav Jackson MRN# [id number]   YOB: 1934 Age: 83 year old     Date of Admission:  3/21/2018  Date of Discharge::  3/24/2018  Admitting Physician:  Brandin Ibarra MD  Discharge Physician:  Ayo Wellington MD  Primary Care Physician:        Isacc Mcgrath          Admission Diagnoses:   Malignant Neoplasm Of Ascending Colon   Colon cancer (H)    Diabetes mellitus type 2  Carotid artery stenosis  BPH  Hypertension  Peripheral vascular disease with claudication          Discharge Diagnosis:   Malignant Neoplasm Of Ascending Colon   Colon cancer (H)    Diabetes mellitus type 2  Carotid artery stenosis  BPH  Hypertension  Peripheral vascular disease with claudication         Procedures:   3/21/2018:  Laparoscopic right hemicolectomy, extensive lysis of adhesions (greater than 1 hour).            Consultations:   NUTRITION SERVICES ADULT IP CONSULT  PHYSICAL THERAPY ADULT IP CONSULT  OCCUPATIONAL THERAPY ADULT IP CONSULT         Imaging Studies:     Results for orders placed or performed in visit on 03/07/18   CT Chest w Contrast    Narrative    EXAM:  CT CHEST W CONTRAST . 3/7/2018 12:09 PM     TECHNIQUE:  Helical CT images from the thoracic inlet through the  upper abdomen were obtained with intravenous contrast. Contrast dose:  Isovue 370 cc 97 mL    COMPARISON: Outside facility CT abdomen 1/29/2018    HISTORY:   ; Malignant neoplasm of ascending colon (H)     FINDINGS:  LUNGS: Small pulmonary nodules, for example (series 4):  - Image 136: 4 mm right upper lobe nodule, subpleural, partially  calcified  - Image 115: 5 mm right upper lobe nodule, subpleural, partially  calcified  - Image 207: 3 mm left lower lobe nodule, subpleural     No mass or consolidation. No pleural effusion or pneumothorax. The  airway is patent.    MEDIASTINUM: Heart is within normal limits. Marked atherosclerotic  plaque of the LAD. Mild  atherosclerotic plaque of the thoracic aorta.  No central pulmonary embolus. Physiologic pericardial fluid. 11 mm  left paratracheal lymph node (series 2, image 25). Thyroid is  unremarkable.    UPPER ABDOMEN: Cortical perfusion defects in the kidneys bilaterally  (example right kidney series 2, image 71) probably sequela of prior  infection or ischemia.    BONES/SOFT TISSUES: Subtle 8 mm round lucency in the posterior right  ninth rib (series 4, image 182). Multilevel degenerative changes of  the thoracic spine. T10 hemangioma.      Impression    IMPRESSION:   1. Scattered sub-6 mm solid partially calcified and noncalcified  pulmonary nodules, suggesting granulomata given the partial  calcifications, however given this patient's history of malignancy,  recommend follow-up if no prior studies are available to establish  chronicity.  2. Indeterminate 11 mm mediastinal lymph node. Attention on follow-up.  3. Round medullary lytic lesion in the posterior right 9th rib.  Differential includes multiple myeloma, benign etiologies such as  enchondroma less likely but possible skeletal skeletal metastasis.     I have personally reviewed the examination and initial interpretation  and I agree with the findings.    MARTINEZ MODI MD          Medications Prior to Admission:     Prescriptions Prior to Admission   Medication Sig Dispense Refill Last Dose     Multiple Vitamins-Minerals (MULTIVITAMIN ADULT PO) Take by mouth every morning    3/20/2018 at am     gabapentin (NEURONTIN) 300 MG capsule Take 300 mg by mouth every morning    3/20/2018 at am     omeprazole (PRILOSEC) 20 MG CR capsule Take 20 mg by mouth every morning    3/20/2018 at am     lisinopril-hydrochlorothiazide (PRINZIDE/ZESTORETIC) 20-12.5 MG per tablet TAKE ONE TABLET BY MOUTH ONCE DAILY IN THE MORNING   3/20/2018 at am     metFORMIN (GLUCOPHAGE) 1000 MG tablet Take 1,000 mg by mouth 2 times daily (with meals)    3/20/2018 at pm     simvastatin (ZOCOR) 20 MG  tablet Take 20 mg by mouth At Bedtime    3/20/2018 at pm     citalopram (CELEXA) 40 MG tablet Take 40 mg by mouth every morning    3/20/2018 at am     Ferrous Sulfate (IRON SUPPLEMENT PO) Take 65 mg by mouth 2 times daily (with meals)    3/20/2018 at pm     Dulaglutide (TRULICITY SC) Inject Subcutaneous once a week WED MORNING   Past Week at Unknown time     [DISCONTINUED] polyethylene glycol (GOLYTELY/NULYTELY) 236 G suspension Refer to surgical packet for instructions 4000 mL 0 3/20/2018 at 2200     [DISCONTINUED] ondansetron (ZOFRAN) 4 MG tablet Take one tablet by mouth every 6 hours as needed for nausea when taking neomycin and flagyl 3 tablet 0 3/20/2018 at pm     [DISCONTINUED] neomycin (MYCIFRADIN) 500 MG tablet Take two tablets by mouth every 8 hours for one day prior to surgery. Take in conjunction with Flagyl 6 tablet 0 3/20/2018 at pm     [DISCONTINUED] ACETAMINOPHEN PO Take 1,000 mg by mouth 2 times daily   3/21/2018 at 0700     [DISCONTINUED] aspirin 325 MG tablet Take 325 mg by mouth every morning    3/20/2018 at am          Discharge Medications:     Current Discharge Medication List      START taking these medications    Details   oxyCODONE IR (ROXICODONE) 5 MG tablet Take 1 tablet (5 mg) by mouth every 6 hours as needed for severe pain  Qty: 28 tablet, Refills: 0    Associated Diagnoses: S/P right hemicolectomy      traMADol (ULTRAM) 50 MG tablet Take 0.5-1 tablets (25-50 mg) by mouth every 6 hours as needed for moderate pain  Qty: 28 tablet, Refills: 0    Associated Diagnoses: S/P right hemicolectomy      ibuprofen (ADVIL/MOTRIN) 400 MG tablet Take 1-2 tablets (400-800 mg) by mouth 3 times daily As scheduled for the next 5-7 days, then decrease both dose and frequency to as needed.  Qty: 50 tablet, Refills: 0    Associated Diagnoses: S/P right hemicolectomy      enoxaparin (LOVENOX) 40 MG/0.4ML injection Inject 0.4 mLs (40 mg) Subcutaneous every 24 hours for 26 days  Qty: 10.4 mL, Refills: 0     Associated Diagnoses: S/P right hemicolectomy         CONTINUE these medications which have CHANGED    Details   acetaminophen (TYLENOL) 500 MG tablet Take 2 tablets (1,000 mg) by mouth 4 times daily As scheduled for the next 5-7 days, then decrease both dose and frequency to as needed.  Qty: 50 tablet, Refills: 0    Associated Diagnoses: S/P right hemicolectomy         CONTINUE these medications which have NOT CHANGED    Details   Multiple Vitamins-Minerals (MULTIVITAMIN ADULT PO) Take by mouth every morning       gabapentin (NEURONTIN) 300 MG capsule Take 300 mg by mouth every morning       omeprazole (PRILOSEC) 20 MG CR capsule Take 20 mg by mouth every morning       lisinopril-hydrochlorothiazide (PRINZIDE/ZESTORETIC) 20-12.5 MG per tablet TAKE ONE TABLET BY MOUTH ONCE DAILY IN THE MORNING      metFORMIN (GLUCOPHAGE) 1000 MG tablet Take 1,000 mg by mouth 2 times daily (with meals)       simvastatin (ZOCOR) 20 MG tablet Take 20 mg by mouth At Bedtime       citalopram (CELEXA) 40 MG tablet Take 40 mg by mouth every morning       Ferrous Sulfate (IRON SUPPLEMENT PO) Take 65 mg by mouth 2 times daily (with meals)       Dulaglutide (TRULICITY SC) Inject Subcutaneous once a week WED MORNING         STOP taking these medications       polyethylene glycol (GOLYTELY/NULYTELY) 236 G suspension Comments:   Reason for Stopping:         ondansetron (ZOFRAN) 4 MG tablet Comments:   Reason for Stopping:         neomycin (MYCIFRADIN) 500 MG tablet Comments:   Reason for Stopping:         aspirin 325 MG tablet Comments:   Reason for Stopping:                     Brief History of Illness:   82 y/o male, PMH of carotid artery stenosis (70% each side), hypertension, PVD with claudication, DM2, BPH, h/o of femoral-femoral cross over graft to improve flow to leg, was found to have an ascending colon adenocarcinoma, moderately differentiated with focal mucin production after work up for anemia and black stools.  Pt was admitted on  "3/21/2018 and underwent   Laparoscopic right hemicolectomy, extensive lysis of adhesions (greater than 1 hour).            Hospital Course:   Pt was gently fluid resuscitated post-operatively.  Pain was as expected and appropriately managed.  Lepe was removed and pt was able to void without difficulties.  Pt had return of bowel function and was able to tolerate small amounts of a low residue diet.  Pt was transition to low dose oxycodone prn for severe pain and tramadol for moderate pain.  Pt is to also take scheduled tylenol and ibuprofen.  Pt was initially restarted on aspirin 81mg. Discussed with vascular surgery and ok to hold aspirin in immediate post-op period but restart as soon as possible.  Aspirin was discontinued and pt was instructed to hold it until seen in the colon and rectal surgery clinic or if unable to get an appointment within 7-10 days to restart aspirin after a week from discharge.  Pt was given prophylactic lovenox to inject daily for 30 days post-operative.  Pt can resume other home medications.     Patient is to follow up in the Colon and Rectal Surgery Clinic in 1 week with Emily Solorio NP and then with Dr. Ibarra in 2-3 weeks after.          Day of Discharge Physical Exam:   Blood pressure 162/49, pulse 70, temperature 97.8  F (36.6  C), temperature source Oral, resp. rate 16, height 1.702 m (5' 7\"), weight 89.1 kg (196 lb 6.4 oz), SpO2 97 %.    Gen: AAOx3, NAD  Pulm: Non-labored breathing  Abd: Soft, mildly distended, appropriately tender, no guarding/rebound   Incision looks good, no erythema, no purulence or fluctaunce  Ext:  Warm and well-perfused         Final Pathology Result:   FINAL PATHOLOGY IS PENDING AT THE TIME OF DISCHARGE           Discharge Instructions and Follow-Up:       Discharge Procedure Orders  Reason for your hospital stay   Order Comments: You underwent:  Laparoscopic right hemicolectomy, extensive lysis of adhesions (greater than 1 hour). "     Activity   Order Comments: Your activity upon discharge:   -No lifting, pushing, pulling greater than 10 lbs and no strenuous exercise for 6 weeks   -No driving while on narcotic analgesics (i.e. Percocet, oxycodone, Vicodin)  -No driving until you are able to fully twist to both sides or slam on brakes quickly and without any pain   Order Specific Question Answer Comments   Is discharge order? Yes      Adult [facility] Follow-up and recommended labs and tests   Order Comments: DIET  -Low Residue Diet for at least 4-6 weeks unless cleared by Colorectal surgery.  No raw vegetables, fruit skins, fibrous foods that require a lot of chewing, nuts, seeds, corn, popcorn.   -We recommend eating slowly, chewing thoroughly, eating small frequent meals throughout the day  -Stay well hydrated.      ACTIVITY  -No lifting, pushing, pulling greater than 10 lbs and no strenuous exercise for 6 weeks   -No driving while on narcotic analgesics (i.e. Percocet, oxycodone, Vicodin)  -No driving until you are able to fully twist to both sides or slam on brakes quickly and without any pain    WOUND CLINIC  -Inspect your wounds daily for signs of infection (increased redness, drainage, pain)  -Keep your wound clean and dry  -You may shower, but do not soak in tub or pool    NOTIFY  Please contact Jordi Marcus LPN, Yakelin Cisneros RN or Karishma Wilde RN at [phone] for problems after discharge such as:  -Temperature > 101F, chills, rigors, dizziness  -Redness around or purulent drainage from wound  -Inability to tolerate diet, nausea or vomiting  -You stop passing gas, develop significant bloating, abdominal pain  -Have blood in stools/vomit  -Have severe diarrhea/constipation  -Any other questions or concerns.  - At nights (after 4:30pm), on weekends, or if urgent, call [phone] and ask the  to speak with the on-call Colorectal Surgery resident or fellow      Medication Instructions  Some of your medications may have  changed. Please take only prescribed and resumed medications     FOLLOW-UP  1.  You will need to follow-up with Emily Solorio NP in the Colon and Rectal Surgery clinic in 1 week(s) and then with CRS Staff: Dr. Ibarra in 2-3 weeks after.  Please contact our clinic scheduler Renita Beauchamp (phone # [phone]) if you have not heard from our clinic in 3 business days afer discharge to schedule a follow-up appointment.     2.  Follow up with your primary care provider in 1-2 weeks after discharge from the hospital to review this hospitalization.     3.  Please taper off pain medications over the next week.    4.  Please hold aspirin for one week after discharge.      Appointments on [facility] and/or [facility] (with [facility] or [facility] provider or service). Call [phone] if you haven't heard regarding these appointments within 7 days of discharge.     Full Code     Diet   Order Comments: Follow this diet upon discharge:   -Low Residue Diet for at least 4-6 weeks unless cleared by Colorectal surgery.  No raw vegetables, fruit skins, fibrous foods that require a lot of chewing, nuts, seeds, corn, popcorn.   -We recommend eating slowly, chewing thoroughly, eating small frequent meals throughout the day  -Stay well hydrated.   Order Specific Question Answer Comments   Is discharge order? Yes             Home Health Care:   Not needed           Discharge Disposition:   Discharged to home      Condition at discharge: Stable      Patient seen with Dr Wellington prior to discharge from the hospital.    Niko Morejon MD (PGY1)  General Surgery

## 2018-03-24 ENCOUNTER — APPOINTMENT (OUTPATIENT)
Dept: OCCUPATIONAL THERAPY | Facility: CLINIC | Age: 83
DRG: 331 | End: 2018-03-24
Attending: COLON & RECTAL SURGERY
Payer: MEDICARE

## 2018-03-24 VITALS
HEIGHT: 67 IN | TEMPERATURE: 97.3 F | RESPIRATION RATE: 18 BRPM | OXYGEN SATURATION: 96 % | DIASTOLIC BLOOD PRESSURE: 61 MMHG | WEIGHT: 196.4 LBS | HEART RATE: 62 BPM | SYSTOLIC BLOOD PRESSURE: 159 MMHG | BODY MASS INDEX: 30.83 KG/M2

## 2018-03-24 LAB
GLUCOSE BLDC GLUCOMTR-MCNC: 123 MG/DL (ref 70–99)
GLUCOSE BLDC GLUCOMTR-MCNC: 139 MG/DL (ref 70–99)
PLATELET # BLD AUTO: 289 10E9/L (ref 150–450)

## 2018-03-24 PROCEDURE — 97535 SELF CARE MNGMENT TRAINING: CPT | Mod: GO

## 2018-03-24 PROCEDURE — 25000132 ZZH RX MED GY IP 250 OP 250 PS 637: Mod: GY | Performed by: COLON & RECTAL SURGERY

## 2018-03-24 PROCEDURE — 00000146 ZZHCL STATISTIC GLUCOSE BY METER IP

## 2018-03-24 PROCEDURE — 85049 AUTOMATED PLATELET COUNT: CPT | Performed by: COLON & RECTAL SURGERY

## 2018-03-24 PROCEDURE — 40000133 ZZH STATISTIC OT WARD VISIT

## 2018-03-24 PROCEDURE — A9270 NON-COVERED ITEM OR SERVICE: HCPCS | Mod: GY | Performed by: COLON & RECTAL SURGERY

## 2018-03-24 PROCEDURE — A9270 NON-COVERED ITEM OR SERVICE: HCPCS | Mod: GY | Performed by: SURGERY

## 2018-03-24 PROCEDURE — 25000132 ZZH RX MED GY IP 250 OP 250 PS 637: Mod: GY | Performed by: SURGERY

## 2018-03-24 PROCEDURE — 36415 COLL VENOUS BLD VENIPUNCTURE: CPT | Performed by: COLON & RECTAL SURGERY

## 2018-03-24 RX ADMIN — GABAPENTIN 300 MG: 300 CAPSULE ORAL at 08:08

## 2018-03-24 RX ADMIN — IBUPROFEN 800 MG: 800 TABLET ORAL at 08:08

## 2018-03-24 RX ADMIN — OMEPRAZOLE 20 MG: 20 CAPSULE, DELAYED RELEASE ORAL at 08:08

## 2018-03-24 RX ADMIN — CITALOPRAM HYDROBROMIDE 40 MG: 20 TABLET ORAL at 08:08

## 2018-03-24 RX ADMIN — ACETAMINOPHEN 1000 MG: 500 TABLET, FILM COATED ORAL at 08:08

## 2018-03-24 NOTE — PLAN OF CARE
Problem: Patient Care Overview  Goal: Plan of Care/Patient Progress Review  Outcome: Improving  POD3 right hemicolectomy with extensive lysis of adhesions for carcinoma of the ascending colon(per MD note).   VS: AVSS  Neuro: A+Ox4  Cardiac: WDL             Respiratory: WDL  GI/: distended, +BS, passing flatus and had stool in the previous shift, voiding good amount  Diet/appetite: tolerating LFD. Good po intake as well.  Activity: SBA with walker in hallways  Pain: no narcs given for pain, mild pain in abd controlled with scheduled tylenol, ibuprofen and gabpentin.  Skin: Lap sites with intact derma bond.  Lines: PIV sl'd.  Pt dc'd to home with children. DC instructions and follow up appointment reviewed with pt and family using teach back. PIV removed. No c/o's at time of DC.

## 2018-03-24 NOTE — PLAN OF CARE
Problem: Patient Care Overview  Goal: Plan of Care/Patient Progress Review  Discharge Planner OT   Patient plan for discharge: home with assist  Current status: Pt educated in use of AE for dressing; pt familiar with LH reacher and sock-aid.  Pt has assist from neighbor for ADL and IADL.  Pt and family in agreement to have assist for tub transfers upon d/c.  No further concerns fot OT.  Barriers to return to prior living situation: none  Recommendations for discharge: home with assist as needed  Rationale for recommendations: Pt safe to d/c home with assist from friends and family       Entered by: Diana Swanson 03/24/2018 12:00 PM     Occupational Therapy Discharge Summary    Reason for therapy discharge:    Discharged to home.  All goals and outcomes met, no further needs identified.    Progress towards therapy goal(s). See goals on Care Plan in [facility] electronic health record for goal details.  Goals met    Therapy recommendation(s):    No further therapy is recommended.

## 2018-03-24 NOTE — PLAN OF CARE
Problem: Patient Care Overview  Goal: Plan of Care/Patient Progress Review  Outcome: Improving  Assumed care of patient 1930. AVSS on RA. Pain controlled with scheduled tylenol and ibuprofen. Denies nausea. Low fiber diet. Passing gas, had BM this morning. Voiding. Incisions liquid bandage c/d/i. [place] - wears hearing aids. Up with SBA, Ambulating halls with walker.     Continue POC. Possible DC to home today.

## 2018-03-25 ENCOUNTER — TELEPHONE (OUTPATIENT)
Dept: SURGERY | Facility: CLINIC | Age: 83
End: 2018-03-25

## 2018-03-26 ENCOUNTER — CARE COORDINATION (OUTPATIENT)
Dept: SURGERY | Facility: CLINIC | Age: 83
End: 2018-03-26

## 2018-03-26 NOTE — PROGRESS NOTES
RN Post Op Care Coordination Note     POST-OP CALL      Patient is s/p Laparoscopic Right Hemicolectomy, Extensive Lysis of Adhesions, Anesthesia Block.     Reports doing well.       Fevers/chills: Patient denies fever/chills.  Eating/drinking: Patient has been alternating between nausea and emesis, patient has continued to have bowel movements. Patient's daughter thought his episodes of emesis had been after he takes his medications on an empty stomach. This RNCC advised the patient to try a dry piece of toast or saltine crackers with his medications to see if this helps decrease his nausea. This RNCC also advised the daughter to bring the patient to the ER here at the [facility] if his emesis continues. Sherri stated understanding of this plan of care.   Bowel habits: Patient reports having soft bowel movement 2-3 times yesterday.  Urine output: Voiding without difficulty, light yellow urine.  Incisions: Patient denies any signs and symptoms of infection. No erythema, swelling or drainage.  Pain: Patient reports pain is controlled with tylenol and ibuprofen.  Narcotics: Not taking the oxycodone due to the stomach upset he has been experiencing.  Follow up appointment is not yet scheduled.     Patient will call with any questions or concerns, all current questions and concerns were addressed to patient's satisfaction, patient aware and in agreement with current plan of care.    SMITH Schwarz Care Coordinator  Colon & Rectal Surgery Clinic  [facility]  [phone]

## 2018-03-27 ENCOUNTER — HOSPITAL ENCOUNTER (INPATIENT)
Facility: CLINIC | Age: 83
LOS: 5 days | Discharge: HOME | DRG: 372 | End: 2018-04-01
Attending: EMERGENCY MEDICINE | Admitting: COLON & RECTAL SURGERY
Payer: MEDICARE

## 2018-03-27 ENCOUNTER — APPOINTMENT (OUTPATIENT)
Dept: GENERAL RADIOLOGY | Facility: CLINIC | Age: 83
DRG: 372 | End: 2018-03-27
Attending: EMERGENCY MEDICINE
Payer: MEDICARE

## 2018-03-27 DIAGNOSIS — R19.7 DIARRHEA, UNSPECIFIED TYPE: ICD-10-CM

## 2018-03-27 DIAGNOSIS — N17.9 ACUTE KIDNEY INJURY (H): ICD-10-CM

## 2018-03-27 DIAGNOSIS — R11.2 NAUSEA AND VOMITING IN ADULT: ICD-10-CM

## 2018-03-27 DIAGNOSIS — A04.72 C. DIFFICILE COLITIS: ICD-10-CM

## 2018-03-27 DIAGNOSIS — E86.0 DEHYDRATION: ICD-10-CM

## 2018-03-27 DIAGNOSIS — I10 HYPERTENSION, UNSPECIFIED TYPE: Primary | ICD-10-CM

## 2018-03-27 PROBLEM — R11.10 VOMITING: Status: ACTIVE | Noted: 2018-03-27

## 2018-03-27 LAB
ALBUMIN SERPL-MCNC: 3.4 G/DL (ref 3.4–5)
ALBUMIN UR-MCNC: 30 MG/DL
ALP SERPL-CCNC: 72 U/L (ref 40–150)
ALT SERPL W P-5'-P-CCNC: 32 U/L (ref 0–70)
ANION GAP SERPL CALCULATED.3IONS-SCNC: 16 MMOL/L (ref 3–14)
APPEARANCE UR: CLEAR
APTT PPP: 31 SEC (ref 22–37)
AST SERPL W P-5'-P-CCNC: 21 U/L (ref 0–45)
BACTERIA #/AREA URNS HPF: ABNORMAL /HPF
BASOPHILS # BLD AUTO: 0 10E9/L (ref 0–0.2)
BASOPHILS NFR BLD AUTO: 0.2 %
BILIRUB SERPL-MCNC: 1.3 MG/DL (ref 0.2–1.3)
BILIRUB UR QL STRIP: NEGATIVE
BUN SERPL-MCNC: 38 MG/DL (ref 7–30)
C DIFF TOX B STL QL: POSITIVE
CALCIUM SERPL-MCNC: 9.8 MG/DL (ref 8.5–10.1)
CHLORIDE SERPL-SCNC: 98 MMOL/L (ref 94–109)
CO2 SERPL-SCNC: 22 MMOL/L (ref 20–32)
COLOR UR AUTO: YELLOW
COPATH REPORT: NORMAL
CREAT SERPL-MCNC: 1.35 MG/DL (ref 0.66–1.25)
DIFFERENTIAL METHOD BLD: ABNORMAL
EOSINOPHIL # BLD AUTO: 0 10E9/L (ref 0–0.7)
EOSINOPHIL NFR BLD AUTO: 0.2 %
ERYTHROCYTE [DISTWIDTH] IN BLOOD BY AUTOMATED COUNT: 16.7 % (ref 10–15)
GFR SERPL CREATININE-BSD FRML MDRD: 50 ML/MIN/1.7M2
GLUCOSE SERPL-MCNC: 205 MG/DL (ref 70–99)
GLUCOSE UR STRIP-MCNC: NEGATIVE MG/DL
HCT VFR BLD AUTO: 37.9 % (ref 40–53)
HGB BLD-MCNC: 12 G/DL (ref 13.3–17.7)
HGB UR QL STRIP: ABNORMAL
HYALINE CASTS #/AREA URNS LPF: 5 /LPF (ref 0–2)
IMM GRANULOCYTES # BLD: 0.1 10E9/L (ref 0–0.4)
IMM GRANULOCYTES NFR BLD: 0.5 %
INR PPP: 1.08 (ref 0.86–1.14)
KETONES UR STRIP-MCNC: 10 MG/DL
LACTATE BLD-SCNC: 1 MMOL/L (ref 0.4–1.9)
LEUKOCYTE ESTERASE UR QL STRIP: NEGATIVE
LYMPHOCYTES # BLD AUTO: 1 10E9/L (ref 0.8–5.3)
LYMPHOCYTES NFR BLD AUTO: 5.6 %
MCH RBC QN AUTO: 29 PG (ref 26.5–33)
MCHC RBC AUTO-ENTMCNC: 31.7 G/DL (ref 31.5–36.5)
MCV RBC AUTO: 92 FL (ref 78–100)
MONOCYTES # BLD AUTO: 1.4 10E9/L (ref 0–1.3)
MONOCYTES NFR BLD AUTO: 7.8 %
MUCOUS THREADS #/AREA URNS LPF: PRESENT /LPF
NEUTROPHILS # BLD AUTO: 14.9 10E9/L (ref 1.6–8.3)
NEUTROPHILS NFR BLD AUTO: 85.7 %
NITRATE UR QL: NEGATIVE
NRBC # BLD AUTO: 0 10*3/UL
NRBC BLD AUTO-RTO: 0 /100
PH UR STRIP: 5 PH (ref 5–7)
PLATELET # BLD AUTO: 439 10E9/L (ref 150–450)
POTASSIUM SERPL-SCNC: 3.5 MMOL/L (ref 3.4–5.3)
PROT SERPL-MCNC: 7.8 G/DL (ref 6.8–8.8)
RBC # BLD AUTO: 4.14 10E12/L (ref 4.4–5.9)
RBC #/AREA URNS AUTO: 14 /HPF (ref 0–2)
SODIUM SERPL-SCNC: 136 MMOL/L (ref 133–144)
SOURCE: ABNORMAL
SP GR UR STRIP: 1.02 (ref 1–1.03)
SPECIMEN SOURCE: ABNORMAL
SQUAMOUS #/AREA URNS AUTO: 1 /HPF (ref 0–1)
TRANS CELLS #/AREA URNS HPF: <1 /HPF (ref 0–1)
UROBILINOGEN UR STRIP-MCNC: NORMAL MG/DL (ref 0–2)
WBC # BLD AUTO: 17.4 10E9/L (ref 4–11)
WBC #/AREA URNS AUTO: 9 /HPF (ref 0–5)

## 2018-03-27 PROCEDURE — 25000132 ZZH RX MED GY IP 250 OP 250 PS 637: Mod: GY | Performed by: STUDENT IN AN ORGANIZED HEALTH CARE EDUCATION/TRAINING PROGRAM

## 2018-03-27 PROCEDURE — 96374 THER/PROPH/DIAG INJ IV PUSH: CPT | Performed by: EMERGENCY MEDICINE

## 2018-03-27 PROCEDURE — 93010 ELECTROCARDIOGRAM REPORT: CPT | Performed by: INTERNAL MEDICINE

## 2018-03-27 PROCEDURE — 25000125 ZZHC RX 250: Performed by: STUDENT IN AN ORGANIZED HEALTH CARE EDUCATION/TRAINING PROGRAM

## 2018-03-27 PROCEDURE — 85730 THROMBOPLASTIN TIME PARTIAL: CPT | Performed by: EMERGENCY MEDICINE

## 2018-03-27 PROCEDURE — 25000128 H RX IP 250 OP 636: Performed by: EMERGENCY MEDICINE

## 2018-03-27 PROCEDURE — 80053 COMPREHEN METABOLIC PANEL: CPT | Performed by: EMERGENCY MEDICINE

## 2018-03-27 PROCEDURE — 83605 ASSAY OF LACTIC ACID: CPT | Performed by: COLON & RECTAL SURGERY

## 2018-03-27 PROCEDURE — 99285 EMERGENCY DEPT VISIT HI MDM: CPT | Mod: Z6 | Performed by: EMERGENCY MEDICINE

## 2018-03-27 PROCEDURE — 74019 RADEX ABDOMEN 2 VIEWS: CPT

## 2018-03-27 PROCEDURE — 87493 C DIFF AMPLIFIED PROBE: CPT | Performed by: EMERGENCY MEDICINE

## 2018-03-27 PROCEDURE — 81001 URINALYSIS AUTO W/SCOPE: CPT | Performed by: PHYSICIAN ASSISTANT

## 2018-03-27 PROCEDURE — 93005 ELECTROCARDIOGRAM TRACING: CPT

## 2018-03-27 PROCEDURE — 12000001 ZZH R&B MED SURG/OB UMMC

## 2018-03-27 PROCEDURE — 85025 COMPLETE CBC W/AUTO DIFF WBC: CPT | Performed by: EMERGENCY MEDICINE

## 2018-03-27 PROCEDURE — 85610 PROTHROMBIN TIME: CPT | Performed by: EMERGENCY MEDICINE

## 2018-03-27 PROCEDURE — 99285 EMERGENCY DEPT VISIT HI MDM: CPT | Mod: 25 | Performed by: EMERGENCY MEDICINE

## 2018-03-27 PROCEDURE — 36415 COLL VENOUS BLD VENIPUNCTURE: CPT | Performed by: COLON & RECTAL SURGERY

## 2018-03-27 PROCEDURE — 25000128 H RX IP 250 OP 636: Performed by: PHYSICIAN ASSISTANT

## 2018-03-27 PROCEDURE — 96361 HYDRATE IV INFUSION ADD-ON: CPT | Performed by: EMERGENCY MEDICINE

## 2018-03-27 RX ORDER — SODIUM CHLORIDE 9 MG/ML
1000 INJECTION, SOLUTION INTRAVENOUS CONTINUOUS
Status: DISCONTINUED | OUTPATIENT
Start: 2018-03-27 | End: 2018-03-27

## 2018-03-27 RX ORDER — HYDROMORPHONE HCL/0.9% NACL/PF 0.2MG/0.2
0.2 SYRINGE (ML) INTRAVENOUS
Status: DISCONTINUED | OUTPATIENT
Start: 2018-03-27 | End: 2018-03-30

## 2018-03-27 RX ORDER — ONDANSETRON 2 MG/ML
4 INJECTION INTRAMUSCULAR; INTRAVENOUS EVERY 30 MIN PRN
Status: DISCONTINUED | OUTPATIENT
Start: 2018-03-27 | End: 2018-03-27

## 2018-03-27 RX ORDER — PROCHLORPERAZINE 25 MG
12.5 SUPPOSITORY, RECTAL RECTAL EVERY 12 HOURS PRN
Status: DISCONTINUED | OUTPATIENT
Start: 2018-03-27 | End: 2018-04-01 | Stop reason: HOSPADM

## 2018-03-27 RX ORDER — ONDANSETRON 2 MG/ML
4 INJECTION INTRAMUSCULAR; INTRAVENOUS EVERY 6 HOURS PRN
Status: DISCONTINUED | OUTPATIENT
Start: 2018-03-27 | End: 2018-04-01 | Stop reason: HOSPADM

## 2018-03-27 RX ORDER — SODIUM CHLORIDE, SODIUM LACTATE, POTASSIUM CHLORIDE, CALCIUM CHLORIDE 600; 310; 30; 20 MG/100ML; MG/100ML; MG/100ML; MG/100ML
INJECTION, SOLUTION INTRAVENOUS CONTINUOUS
Status: DISCONTINUED | OUTPATIENT
Start: 2018-03-27 | End: 2018-03-31

## 2018-03-27 RX ORDER — ONDANSETRON 4 MG/1
4 TABLET, ORALLY DISINTEGRATING ORAL EVERY 6 HOURS PRN
Status: DISCONTINUED | OUTPATIENT
Start: 2018-03-27 | End: 2018-04-01 | Stop reason: HOSPADM

## 2018-03-27 RX ORDER — NALOXONE HYDROCHLORIDE 0.4 MG/ML
.1-.4 INJECTION, SOLUTION INTRAMUSCULAR; INTRAVENOUS; SUBCUTANEOUS
Status: DISCONTINUED | OUTPATIENT
Start: 2018-03-27 | End: 2018-04-01 | Stop reason: HOSPADM

## 2018-03-27 RX ORDER — PROCHLORPERAZINE MALEATE 5 MG
5 TABLET ORAL EVERY 6 HOURS PRN
Status: DISCONTINUED | OUTPATIENT
Start: 2018-03-27 | End: 2018-04-01 | Stop reason: HOSPADM

## 2018-03-27 RX ADMIN — ONDANSETRON 4 MG: 2 INJECTION INTRAMUSCULAR; INTRAVENOUS at 16:53

## 2018-03-27 RX ADMIN — METRONIDAZOLE 500 MG: 500 INJECTION, SOLUTION INTRAVENOUS at 19:54

## 2018-03-27 RX ADMIN — Medication 125 MG: at 19:57

## 2018-03-27 RX ADMIN — SODIUM CHLORIDE 1000 ML: 9 INJECTION, SOLUTION INTRAVENOUS at 06:36

## 2018-03-27 RX ADMIN — ONDANSETRON 4 MG: 2 INJECTION INTRAMUSCULAR; INTRAVENOUS at 06:35

## 2018-03-27 RX ADMIN — SODIUM CHLORIDE 1000 ML: 9 INJECTION, SOLUTION INTRAVENOUS at 07:30

## 2018-03-27 RX ADMIN — Medication 0.2 MG: at 14:56

## 2018-03-27 RX ADMIN — PANTOPRAZOLE SODIUM 40 MG: 40 INJECTION, POWDER, FOR SOLUTION INTRAVENOUS at 12:54

## 2018-03-27 RX ADMIN — SODIUM CHLORIDE, POTASSIUM CHLORIDE, SODIUM LACTATE AND CALCIUM CHLORIDE: 600; 310; 30; 20 INJECTION, SOLUTION INTRAVENOUS at 16:54

## 2018-03-27 RX ADMIN — SODIUM CHLORIDE 1000 ML: 9 INJECTION, SOLUTION INTRAVENOUS at 10:15

## 2018-03-27 ASSESSMENT — ENCOUNTER SYMPTOMS
MYALGIAS: 0
RHINORRHEA: 0
HEADACHES: 0
DYSURIA: 0
NAUSEA: 1
BRUISES/BLEEDS EASILY: 0
CHILLS: 0
SORE THROAT: 0
FLANK PAIN: 0
ABDOMINAL PAIN: 1
CONFUSION: 0
SHORTNESS OF BREATH: 0
DIARRHEA: 1
VOMITING: 1
EYE DISCHARGE: 0
BACK PAIN: 0
COUGH: 0
WEAKNESS: 1
FEVER: 0

## 2018-03-27 NOTE — IP AVS SNAPSHOT
MRN:[id number]                      After Visit Summary   3/27/2018    Pranav Jackson    MRN: [id number]           Thank you!     Thank you for choosing [facility] for your care. Our goal is always to provide you with excellent care. Hearing back from our patients is one way we can continue to improve our services. Please take a few minutes to complete the written survey that you may receive in the mail after you visit with us. Thank you!        Patient Information     Date Of Birth          11/28/1934        Designated Caregiver       Most Recent Value    Caregiver    Will someone help with your care after discharge? yes    Name of designated caregiver Sherri    Phone number of caregiver [phone]    Caregiver address [place]      About your hospital stay     You were admitted on:  March 27, 2018 You last received care in the:  [address] [facility]    You were discharged on:  April 1, 2018        Reason for your hospital stay       You were admitted for nausea, vomiting, diarrhea.  You were found to have c.diff diarrhea and also an ileus.                  Who to Call     For medical emergencies, please call 911.  For non-urgent questions about your medical care, please call your primary care provider or clinic, [phone]          Attending Provider     Provider Specialty    Megan Vernon MD Emergency Medicine    [facility]       Primary Care Provider Office Phone # Fax #    Isacc Mcgrath [phone]      After Care Instructions     Activity       Your activity upon discharge:   -No lifting, pushing, pulling greater than 10 lbs and no strenuous exercise for 6 weeks   -No driving while on narcotic analgesics (i.e. Percocet, oxycodone, Vicodin)  -No driving until you are able to fully twist to both sides or slam on brakes quickly and without any pain            Diet       Follow this diet upon discharge:   -Low Residue Diet for at  least 4-6 weeks unless cleared by Colorectal surgery.  No raw vegetables, fruit skins, fibrous foods that require a lot of chewing, nuts, seeds, corn, popcorn.   -We recommend eating slowly, chewing thoroughly, eating small frequent meals throughout the day  -Stay well hydrated.  (Dark concentrated urine indicates dehydration.)                  Follow-up Appointments     Adult [facility] Follow-up and recommended labs and tests       DIET  -Low Residue Diet for at least 4-6 weeks unless cleared by Colorectal surgery.  No raw vegetables, fruit skins, fibrous foods that require a lot of chewing, nuts, seeds, corn, popcorn.   -We recommend eating slowly, chewing thoroughly, eating small frequent meals throughout the day  -Stay well hydrated.  (Dark concentrated urine indicates dehydration.)     ACTIVITY  -No lifting, pushing, pulling greater than 10 lbs and no strenuous exercise for 6 weeks   -No driving while on narcotic analgesics (i.e. Percocet, oxycodone, Vicodin)  -No driving until you are able to fully twist to both sides or slam on brakes quickly and without any pain    WOUND CLINIC  -Inspect your wounds daily for signs of infection (increased redness, drainage, pain)  -Keep your wound clean and dry  -You may shower, but do not soak in tub or pool    NOTIFY  Please contact Jordi Marcus LPN, Yakelin Cisneros RN or Karishma Wilde RN at [phone] for problems after discharge such as:  -Temperature > 101F, chills, rigors, dizziness  -Redness around or purulent drainage from wound  -Inability to tolerate diet, nausea or vomiting  -You stop passing gas, develop significant bloating, abdominal pain  -Have blood in stools/vomit  -Have severe diarrhea/constipation  -Any other questions or concerns.  - At nights (after 4:30pm), on weekends, or if urgent, call [phone] and ask the  to speak with the on-call Colorectal Surgery resident or fellow      Medication Instructions  Some of your medications may have  changed. Please take only prescribed and resumed medications     FOLLOW-UP  1.  You will need to follow-up with Emily Solorio NP in the Colon and Rectal Surgery clinic in 1 week(s) and then with [facility] Staff: Dr. Ibarra in 2-3 weeks after.  Please contact our clinic scheduler Renita Beauchamp (phone # [phone]) if you have not heard from our clinic in 3 business days afer discharge to schedule a follow-up appointment.     2.  Follow up with your primary care provider in 1-2 weeks after discharge from the hospital to review this hospitalization.     3.  We have prescribed a weeks worth of your blood pressure medication Lisinopril.  Please hold your usual combination pill of Lisinopril-Hydrochlorothiazide.  You can resume your combination pill when your diarrhea has subsided.  Hydrochlorothiazide will cause electrolyte losses therefore we would like you to hold this while you have diarrhea.       Appointments on [place] and/or [facility] (with [facility] or [facility] provider or service). Call [phone] if you haven't heard regarding these appointments within 7 days of discharge.                  Your next 10 appointments already scheduled     Jun 07, 2018 12:30 PM CDT   US CAROTID BILATERAL with UCUSV2   [facility] ([facility])    [address] [place] [id number] [phone]           Please bring a list of your medicines (including vitamins, minerals and over-the-counter drugs). Also, tell your doctor about any allergies you may have. Wear comfortable clothes and leave your valuables at home.  You do not need to do anything special to prepare for your exam.  Please call the Imaging Department at your exam site with any questions.            Jun 07, 2018  1:45 PM CDT   (Arrive by 1:30 PM)   Return Vascular Visit with Allyn Walters MD   [facility] ([facility])    [address] [place] [address]  "Floor  [facility] [id number] [phone]              Pending Results     Date and Time Order Name Status Description    2018 0000 Phosphorus In process     2018 0000 Magnesium In process     2018 0000 Basic metabolic panel In process             Statement of Approval     Ordered          18 0840  I have reviewed and agree with all the recommendations and orders detailed in this document.  EFFECTIVE NOW     Approved and electronically signed by:  Kendra Junior MD             Admission Information     Date & Time Provider Department Dept. Phone    3/27/2018 Brandin Ibarra MD [address] [facility] [phone]      Your Vitals Were     Blood Pressure Pulse Temperature Respirations Height Weight    152/60 (BP Location: Left arm) 80 96.2  F (35.7  C) (Oral) 14 1.702 m (5' 7\") 86.8 kg (191 lb 4.8 oz)    Pulse Oximetry BMI (Body Mass Index)                96% 29.96 kg/m2          MyChart Information     [employer] lets you send messages to your doctor, view your test results, renew your prescriptions, schedule appointments and more. To sign up, go to [email or web address] . Click on \"Log in\" on the left side of the screen, which will take you to the Welcome page. Then click on \"Sign up Now\" on the right side of the page.     You will be asked to enter the access code listed below, as well as some personal information. Please follow the directions to create your username and password.     Your access code is: [email or web address]  Expires: 2018  7:30 AM     Your access code will  in 90 days. If you need help or a new code, please call your [place] clinic or [phone].        Care EveryWhere ID     This is your Care EveryWhere ID. This could be used by other organizations to access your [place] medical records  [id number]        Equal Access to Services     SAGE ROBLEDO AH: Oswald Alexandre, abhijit clarke, jana schmidt " ah. So [facility] [phone].    ATENCIÓN: Si habla amina, tiene a stoner disposición servicios gratuitos de asistencia lingüística. Llaugie al [phone].    We comply with applicable federal civil rights laws and [place] laws. We do not discriminate on the basis of race, color, national origin, age, disability, sex, sexual orientation, or gender identity.               Review of your medicines      START taking        Dose / Directions    aspirin 325 MG tablet        Dose:  325 mg   Take 1 tablet (325 mg) by mouth daily   Quantity:  120 tablet   Refills:  0       lisinopril 20 MG tablet   Commonly known as:  PRINIVIL/ZESTRIL   Used for:  Acute kidney injury (H), Dehydration, Diarrhea, unspecified type, Hypertension, unspecified type        Dose:  20 mg   Take 1 tablet (20 mg) by mouth daily for 7 days   Quantity:  7 tablet   Refills:  0       vancomycin 50 mg/mL Liqd solution   Commonly known as:  VANOCIN   Indication:  Clostridium difficile Bacteria   Used for:  C. difficile colitis        Dose:  125 mg   Take 2.5 mLs (125 mg) by mouth 4 times daily for 14 days   Quantity:  140 mL   Refills:  0         CONTINUE these medicines which have NOT CHANGED        Dose / Directions    acetaminophen 500 MG tablet   Commonly known as:  TYLENOL   Used for:  S/P right hemicolectomy        Dose:  1000 mg   Take 2 tablets (1,000 mg) by mouth 4 times daily As scheduled for the next 5-7 days, then decrease both dose and frequency to as needed.   Quantity:  50 tablet   Refills:  0       citalopram 40 MG tablet   Commonly known as:  celeXA        Dose:  40 mg   Take 40 mg by mouth every morning   Refills:  0       enoxaparin 40 MG/0.4ML injection   Commonly known as:  LOVENOX   Used for:  S/P right hemicolectomy        Dose:  40 mg   Inject 0.4 mLs (40 mg) Subcutaneous every 24 hours for 26 days   Quantity:  10.4 mL   Refills:  0       gabapentin 300 MG capsule   Commonly known as:  NEURONTIN        Dose:  300 mg   Take 300 mg by mouth  every morning   Refills:  0       IRON SUPPLEMENT PO        Dose:  65 mg   Take 65 mg by mouth 2 times daily (with meals)   Refills:  0       metFORMIN 1000 MG tablet   Commonly known as:  GLUCOPHAGE        Dose:  1000 mg   Take 1,000 mg by mouth 2 times daily (with meals)   Refills:  0       MULTIVITAMIN ADULT PO        Take by mouth every morning   Refills:  0       omeprazole 20 MG CR capsule   Commonly known as:  priLOSEC        Dose:  20 mg   Take 20 mg by mouth every morning   Refills:  0       simvastatin 20 MG tablet   Commonly known as:  ZOCOR        Dose:  20 mg   Take 20 mg by mouth At Bedtime   Refills:  0       TRULICITY SC        Dose:  0.75 mg   Inject 0.75 mg Subcutaneous once a week WED MORNING   Refills:  0         STOP taking     ibuprofen 400 MG tablet   Commonly known as:  ADVIL/MOTRIN           lisinopril-hydrochlorothiazide 20-12.5 MG per tablet   Commonly known as:  PRINZIDE/ZESTORETIC           oxyCODONE IR 5 MG tablet   Commonly known as:  ROXICODONE           traMADol 50 MG tablet   Commonly known as:  ULTRAM                Where to get your medicines      These medications were sent to [facility] [place], [place] - [address] [address]     Phone:  [phone]     lisinopril 20 MG tablet    vancomycin 50 mg/mL Liqd solution                Protect others around you: Learn how to safely use, store and throw away your medicines at www.disposemymeds.org.        ANTIBIOTIC INSTRUCTION     You've Been Prescribed an Antibiotic - Now What?  Your healthcare team thinks that you or your loved one might have an infection. Some infections can be treated with antibiotics, which are powerful, life-saving drugs. Like all medications, antibiotics have side effects and should only be used when necessary. There are some important things you should know about your antibiotic treatment.      Your healthcare team may run tests before you start  taking an antibiotic.    Your team may take samples (e.g., from your blood, urine or other areas) to run tests to look for bacteria. These test can be important to determine if you need an antibiotic at all and, if you do, which antibiotic will work best.      Within a few days, your healthcare team might change or even stop your antibiotic.    Your team may start you on an antibiotic while they are working to find out what is making you sick.    Your team might change your antibiotic because test results show that a different antibiotic would be better to treat your infection.    In some cases, once your team has more information, they learn that you do not need an antibiotic at all. They may find out that you don't have an infection, or that the antibiotic you're taking won't work against your infection. For example, an infection caused by a virus can't be treated with antibiotics. Staying on an antibiotic when you don't need it is more likely to be harmful than helpful.      You may experience side effects from your antibiotic.    Like all medications, antibiotics have side effects. Some of these can be serious.    Let you healthcare team know if you have any known allergies when you are admitted to the hospital.    One significant side effect of nearly all antibiotics is the risk of severe and sometimes deadly diarrhea caused by Clostridium difficile (C. Difficile). This occurs when a person takes antibiotics because some good germs are destroyed. Antibiotic use allows C. diificile to take over, putting patients at high risk for this serious infection.    As a patient or caregiver, it is important to understand your or your loved one's antibiotic treatment. It is especially important for caregivers to speak up when patients can't speak for themselves. Here are some important questions to ask your healthcare team.    What infection is this antibiotic treating and how do you know I have that infection?    What  side effects might occur from this antibiotic?    How long will I need to take this antibiotic?    Is it safe to take this antibiotic with other medications or supplements (e.g., vitamins) that I am taking?     Are there any special directions I need to know about taking this antibiotic? For example, should I take it with food?    How will I be monitored to know whether my infection is responding to the antibiotic?    What tests may help to make sure the right antibiotic is prescribed for me?      Information provided by:  www.cdc.gov/getsmart  U.S. Department of Health and Human Services  Centers for disease Control and Prevention  National Center for Emerging and Zoonotic Infectious Diseases  Division of Healthcare Quality Promotion             Medication List: This is a list of all your medications and when to take them. Check marks below indicate your daily home schedule. Keep this list as a reference.      Medications           Morning Afternoon Evening Bedtime As Needed    acetaminophen 500 MG tablet   Commonly known as:  TYLENOL   Take 2 tablets (1,000 mg) by mouth 4 times daily As scheduled for the next 5-7 days, then decrease both dose and frequency to as needed.                                aspirin 325 MG tablet   Take 1 tablet (325 mg) by mouth daily   Last time this was given:  325 mg on 4/1/2018  7:45 AM                                citalopram 40 MG tablet   Commonly known as:  celeXA   Take 40 mg by mouth every morning                                enoxaparin 40 MG/0.4ML injection   Commonly known as:  LOVENOX   Inject 0.4 mLs (40 mg) Subcutaneous every 24 hours for 26 days   Last time this was given:  40 mg on 3/31/2018  9:59 AM                                gabapentin 300 MG capsule   Commonly known as:  NEURONTIN   Take 300 mg by mouth every morning                                IRON SUPPLEMENT PO   Take 65 mg by mouth 2 times daily (with meals)                                lisinopril 20 MG  tablet   Commonly known as:  PRINIVIL/ZESTRIL   Take 1 tablet (20 mg) by mouth daily for 7 days   Last time this was given:  20 mg on 4/1/2018  7:45 AM                                metFORMIN 1000 MG tablet   Commonly known as:  GLUCOPHAGE   Take 1,000 mg by mouth 2 times daily (with meals)                                MULTIVITAMIN ADULT PO   Take by mouth every morning                                omeprazole 20 MG CR capsule   Commonly known as:  priLOSEC   Take 20 mg by mouth every morning   Last time this was given:  20 mg on 4/1/2018  7:45 AM                                simvastatin 20 MG tablet   Commonly known as:  ZOCOR   Take 20 mg by mouth At Bedtime                                TRULICITY SC   Inject 0.75 mg Subcutaneous once a week WED MORNING                                vancomycin 50 mg/mL Liqd solution   Commonly known as:  VANOCIN   Take 2.5 mLs (125 mg) by mouth 4 times daily for 14 days   Last time this was given:  125 mg on 4/1/2018  7:45 AM

## 2018-03-27 NOTE — ED NOTES
Pt arrived to ER with nausea/vomiting and diarrhea starting Sunday and getting worse. Pt had a recent colon surgery, about a week ago. Today pt has not been able to keep fluid or meds down. As per daughter, emesis was green/brown and some red streak noted in diarrhea. VSS and afebrile. A&ox4, [place].

## 2018-03-27 NOTE — IP AVS SNAPSHOT
[address] [address]    Phone:  [phone]                                       After Visit Summary   3/27/2018    Pranav Jackson    MRN: [id number]           After Visit Summary Signature Page     I have received my discharge instructions, and my questions have been answered. I have discussed any challenges I see with this plan with the nurse or doctor.    ..........................................................................................................................................  Patient/Patient Representative Signature      ..........................................................................................................................................  Patient Representative Print Name and Relationship to Patient    ..................................................               ................................................  Date                                            Time    ..........................................................................................................................................  Reviewed by Signature/Title    ...................................................              ..............................................  Date                                                            Time

## 2018-03-27 NOTE — H&P
"Colorectal Surgery Admission History and Physical     Pranav Jackson MRN# [id number]   YOB: 1934 Age: 83 year old      Date of Admission:  3/27/2018    CC: nausea, vomiting, diarrhea    Assessment: 83 year old male with PMH of carotid artery stenosis (70% bilat), PVD with claudication, HTN, DM2, BPH, h/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  On 3/21/2018 underwent lap right hemicolectomy with extensive TALIA, discharged on 3/24/2018.  Readmitted on 3/27 with nausea, vomiting, profuse diarrhea, inability to tolerate orals.  Differential includes intra-abdominal abscess/infection, c.diff diarrhea, anastomotic leak, SBO.  Abdominal exam is appropriate in post-surgical state.  No peritonitis.  Pt is non-toxic appearing.      Plan:  - admit to colorectal, 7C  - continue IV hydration, LR @ 100  - please check 2 view AXR - dilated small bowel, air fluid levels.  May require NGT. (this was discussed with pt and son and daughter in law)  - please check c.diff.  Check UA.   - will trend labs and creatinine  - likely CT scan when creat improves  - EKG.  Low threshold for further cardiac work up as high risk  - IV protonix  - hold lovenox ppx for now  - hold home aspirin for now  - AM labs    HPI:   History obtained from patient and daughter.   Pt was discharged on Saturday and was tolerating a diet prior to discharge.  Ate a light breakfast on Sunday of toast, scrambled eggs and tomato juice and coffee.  By Sunday evening, had nausea, vomiting, diarrhea.  Stools initially were loose but had some \"chunks\" and has since progressed to pure brown-green liquid.  Emesis is green.  No blood.  He has now had profuse vomiting and diarrhea.    Pt is sore from vomiting.  Does not think he has actually had worsening of abdominal pain per say, but more pain from the vomiting.   Denies abdominal bloating.  Pants do not necessarily feel tighter than usual.  Does have some pain " "with swallowing/drinking, he feels like something is 'trying to push it back up.\" denies chest pain per say, denies chest pressure, numbness in arms/jaw, denies shortness of breath.  Urine has been clear.  Denies fevers.  Denies lightheadedness, dizziness.  A little tremulous and a little weak.  However notes that at baseline can be a little tremulous at times.  And at baseline, when going from sitting to standing, he has to stand a while to regain his balance before he can move.    Last emesis and loose BM with flatus was at about 3am.  Has not passed anything since.       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)      PVD (peripheral vascular disease) with claudication (H)        Past Surgical History:  Past Surgical History:   Procedure Laterality Date     ARTHROSCOPY KNEE WITH MENISCECTOMY Right 2008     CATARACT IOL, RT/LT  1-2 years ago     Femoral bypass surgeries  03/2005     L4 decompression  2007     LAPAROSCOPIC ASSISTED COLECTOMY Right 3/21/2018    Procedure: LAPAROSCOPIC ASSISTED COLECTOMY;  Laparoscopic Right Hemicolectomy, Extensive Lysis of Adhesions,, Anesthesia Block ([facility] Patient);  Surgeon: Brandin Ibarra MD;  Location: [facility] OR     TONSILLECTOMY       TURP  04/11/2017    with laser       Allergies:     Allergies   Allergen Reactions     Penicillins Hives     Tolerated PO cephalosporin 7/2016       Medications:    No current facility-administered medications on file prior to encounter.   Current Outpatient Prescriptions on File Prior to Encounter:  oxyCODONE IR (ROXICODONE) 5 MG tablet Take 1 tablet (5 mg) by mouth " every 6 hours as needed for severe pain   traMADol (ULTRAM) 50 MG tablet Take 0.5-1 tablets (25-50 mg) by mouth every 6 hours as needed for moderate pain   acetaminophen (TYLENOL) 500 MG tablet Take 2 tablets (1,000 mg) by mouth 4 times daily As scheduled for the next 5-7 days, then decrease both dose and frequency to as needed.   ibuprofen (ADVIL/MOTRIN) 400 MG tablet Take 1-2 tablets (400-800 mg) by mouth 3 times daily As scheduled for the next 5-7 days, then decrease both dose and frequency to as needed.   enoxaparin (LOVENOX) 40 MG/0.4ML injection Inject 0.4 mLs (40 mg) Subcutaneous every 24 hours for 26 days   Multiple Vitamins-Minerals (MULTIVITAMIN ADULT PO) Take by mouth every morning    gabapentin (NEURONTIN) 300 MG capsule Take 300 mg by mouth every morning    omeprazole (PRILOSEC) 20 MG CR capsule Take 20 mg by mouth every morning    lisinopril-hydrochlorothiazide (PRINZIDE/ZESTORETIC) 20-12.5 MG per tablet TAKE ONE TABLET BY MOUTH ONCE DAILY IN THE MORNING   metFORMIN (GLUCOPHAGE) 1000 MG tablet Take 1,000 mg by mouth 2 times daily (with meals)    simvastatin (ZOCOR) 20 MG tablet Take 20 mg by mouth At Bedtime    citalopram (CELEXA) 40 MG tablet Take 40 mg by mouth every morning    Ferrous Sulfate (IRON SUPPLEMENT PO) Take 65 mg by mouth 2 times daily (with meals)    Dulaglutide (TRULICITY SC) Inject Subcutaneous once a week WED MORNING     Last doses:   Citalopram (Monday AM), lovenox (Mon 1pm), gabapentin (Mon AM) lisinopril-HCTZ (Mon AM), metformin (Mon AM), prilosec (Mon AM), simvastatin (Sun PM), trulicity (Wed prior to surgery so it has been almost 2 weeks), was not taking ibuprofen, iron, MVI, tramadol.     Medications prior to Admission:    (Not in a hospital admission)    Social History:  Social History     Social History     Marital status:      Spouse name: N/A     Number of children: N/A     Years of education: N/A     Occupational History     Not on file.     Social History Main  "Topics     Smoking status: Former Smoker     Packs/day: 1.00     Years: 40.00     Types: Cigarettes     Smokeless tobacco: Never Used      Comment: Quit 20 yearsago.     Alcohol use Yes      Comment: 1x/month     Drug use: No     Sexual activity: Not on file     Other Topics Concern     Not on file     Social History Narrative       Family History:  Family History   Problem Relation Age of Onset     Other Cancer Mother      gyn     Other Cancer Father      spine     Thrombosis Son      Factor V Leiden     Thrombosis Daughter        ROS:  The remainder of the complete ROS was negative unless noted in the HPI.    Exam:  /51  Pulse 100  Temp 98.1  F (36.7  C) (Oral)  Resp 18  Ht 1.702 m (5' 7\")  Wt 90.3 kg (199 lb)  SpO2 95%  BMI 31.17 kg/m2  General: Alert, interactive, NAD  Resp: CTAB.  Normal respiratory effort.    Cardiac: RRR  Abdomen: Soft, minimally tender, nondistended.  no rebound or guarding.  Extremities: No LE edema or obvious joint abnormalities  Skin: Warm and dry, no jaundice or rash    Labs:  reviewed      Demetra Bhatt PA-C..................3/27/2018   7:40 AM  Colon and Rectal Surgery  [id number]    Seen and discussed with Fellow, Dr. Balderas on 3/27/2018    "

## 2018-03-27 NOTE — ED PROVIDER NOTES
History     Chief Complaint   Patient presents with     Nausea, Vomiting, & Diarrhea     post op colon surgery      HPI  Pranav Jackson is a 83 year old male who past medical history of hypertension, diabetes, peripheral vascular disease, carotid artery stenosis, BPH, history of ascending colon adenocarcinoma status post right hemicolectomy and lysis of adhesions on 3/21/2018 who presents emergency department from home with his daughter with a complaint of a 2 day history of nausea, vomiting, diarrhea.  He recently had a hospitalization on 3/21/2018 to 3/24 2018 for laparoscopic right hemicolectomy, lysis of adhesions.  Patient reports he has been doing well since discharge however since Sunday he developed nausea, vomiting, diarrhea along with decreased oral intake.  Multiple episodes of nausea, vomiting, and loose watery stools.  Patient denies any blood.  Patient's family reports she has not been eating or drinking at home.  Denies any fever, chills, chest pain, shortness of breath.  Patient reports some lower abdominal pain but that has been present since his surgery.  No new drainage from the wound or increasing pain.    I have reviewed the Medications, Allergies, Past Medical and Surgical History, and Social History in the Epic system.    Review of Systems   Constitutional: Negative for chills and fever.   HENT: Negative for congestion, rhinorrhea and sore throat.    Eyes: Negative for discharge.   Respiratory: Negative for cough and shortness of breath.    Cardiovascular: Negative for chest pain and leg swelling.   Gastrointestinal: Positive for abdominal pain, diarrhea, nausea and vomiting.   Endocrine: Negative for polyuria.   Genitourinary: Negative for dysuria and flank pain.   Musculoskeletal: Negative for back pain and myalgias.   Skin: Negative for rash.   Allergic/Immunologic: Negative for immunocompromised state.   Neurological: Positive for weakness. Negative for headaches.   Hematological: Does  "not bruise/bleed easily.   Psychiatric/Behavioral: Negative for confusion and suicidal ideas.       Physical Exam   BP: 139/57  Pulse: 100  Temp: 98.1  F (36.7  C)  Resp: 18  Height: 170.2 cm (5' 7\")  Weight: 90.3 kg (199 lb)  SpO2: 97 %      Physical Exam   Constitutional: He is oriented to person, place, and time. He appears well-developed. No distress.   HENT:   Head: Normocephalic and atraumatic.   Dry mucous membranes   Eyes: Conjunctivae and EOM are normal. Pupils are equal, round, and reactive to light.   Neck: Normal range of motion. Neck supple.   Cardiovascular: Normal rate, regular rhythm and normal heart sounds.    Pulmonary/Chest: Effort normal and breath sounds normal. No respiratory distress. He has no wheezes. He has no rales.   Abdominal: Soft. He exhibits no distension. There is tenderness. There is no rebound and no guarding.   Mild diffuse lower abdomen TTP with no rebound, no guarding, small vertical midline incision healing well with no erythema, no drainage   Musculoskeletal: Normal range of motion. He exhibits no tenderness or deformity.   Neurological: He is alert and oriented to person, place, and time. No cranial nerve deficit. Coordination normal.   Skin: Skin is warm and dry. No rash noted.   Psychiatric: He has a normal mood and affect. His behavior is normal.       ED Course     ED Course     Procedures             Critical Care time:  none             Labs Ordered and Resulted from Time of ED Arrival Up to the Time of Departure from the ED   CBC WITH PLATELETS DIFFERENTIAL - Abnormal; Notable for the following:        Result Value    WBC 17.4 (*)     RBC Count 4.14 (*)     Hemoglobin 12.0 (*)     Hematocrit 37.9 (*)     RDW 16.7 (*)     Absolute Neutrophil 14.9 (*)     Absolute Monocytes 1.4 (*)     All other components within normal limits   COMPREHENSIVE METABOLIC PANEL - Abnormal; Notable for the following:     Anion Gap 16 (*)     Glucose 205 (*)     Urea Nitrogen 38 (*)     " Creatinine 1.35 (*)     GFR Estimate 50 (*)     All other components within normal limits   INR   PARTIAL THROMBOPLASTIN TIME   PERIPHERAL IV CATHETER   CLOSTRIDIUM DIFFICILE TOXIN B       Consults  Surgery: Responded (colon/rectal resident) (03/27/18 2320)    Assessments & Plan (with Medical Decision Making)   Pranav Jackson is a 83 year old male who past medical history of hypertension, diabetes, peripheral vascular disease, carotid artery stenosis, BPH, history of ascending colon adenocarcinoma status post right hemicolectomy and lysis of adhesions on 3/21/2018 who presents emergency department from home with his daughter with a complaint of a 2 day history of nausea, vomiting, diarrhea.  He recently had a hospitalization on 3/21/2018 to 3/24 2018 for laparoscopic right hemicolectomy, lysis of adhesions.  Upon arrival patient is well-appearing, afebrile, no distress.  Patient with dry mucous membranes, abdomen is soft, mild diffuse tenderness in his lower abdomen with no rebound, no guarding, no peritoneal signs, small midline vertical incision healing well with no erythema, no drainage.  At this time IV was established, patient received 4 mg of Zofran, 1 L of saline IV fluid bolus, labs, and reevaluated.  I reviewed competence of labs which remarkable for leukocytosis of 17.4 which is increased from prior of 12.7 on 3/22, patient now with acute kidney failure likely due to dehydration, nausea, vomiting, diarrhea with a creatinine 1.35, patient's creatinine on 3/22 was 0.89.  Patient with nonsurgical abdomen and otherwise is well-appearing and afebrile.  At this time will give patient another 1 L normal saline IV fluid bolus along with maintenance IV fluids at 125 mL's per hour and plan on admission for further evaluation of dehydration, nausea, vomiting, diarrhea, acute kidney failure, and monitor leukocytosis. Will also send for C diff and XR abdomen 2 view. Discussed with colorectal surgery who will admit  patient.  Patient and family understand and agree with the plan.    I have reviewed the nursing notes.    I have reviewed the findings, diagnosis, plan and need for follow up with the patient.    New Prescriptions    No medications on file       Final diagnoses:   Nausea and vomiting in adult   Diarrhea, unspecified type   Dehydration   Acute kidney injury (H)       3/27/2018   [facility], [place], EMERGENCY DEPARTMENT     Megan Vernon MD  03/27/18 0804

## 2018-03-27 NOTE — PHARMACY-ADMISSION MEDICATION HISTORY
Admission medication history interview status for the 3/27/2018 admission is complete. See Epic admission navigator for allergy information, pharmacy, prior to admission medications and immunization status.     Medication history interview sources:  Patient, daughter    Changes made to PTA medication list (reason)  Added: none  Deleted: none  Changed: dulaglutide - added dose     Additional medication history information (including reliability of information, actions taken by pharmacist):    The patient's daughter had a recent discharge medication list, and she and the patient were able to supply information about last doses taken of each medication.    The patient was asked about his penicillin allergy. He reports that the reaction occurred >50 years ago, and he did not remember the exact details of the reaction. He described blistering on his hands that may have occurred several days after he commenced penicillin treatment. He and his daughter do not recall him taking other similar drugs (amoxicillin, augmentin) since, but his daughter believes he has taken cephalexin since then without incident.    The patient's daughter reports that the patient has had a flu shot this year.        Prior to Admission medications    Medication Sig Last Dose Taking? Auth Provider   oxyCODONE IR (ROXICODONE) 5 MG tablet Take 1 tablet (5 mg) by mouth every 6 hours as needed for severe pain 3/24/2018 at not taking at home Yes Demetra Bhatt PA-C   traMADol (ULTRAM) 50 MG tablet Take 0.5-1 tablets (25-50 mg) by mouth every 6 hours as needed for moderate pain 3/25/2018 at 1600 Yes Demetra Bhatt PA-C   acetaminophen (TYLENOL) 500 MG tablet Take 2 tablets (1,000 mg) by mouth 4 times daily As scheduled for the next 5-7 days, then decrease both dose and frequency to as needed. 3/26/2018 at PM Yes Demetra Bhatt PA-C   ibuprofen (ADVIL/MOTRIN) 400 MG tablet Take 1-2 tablets (400-800 mg) by mouth 3 times  daily As scheduled for the next 5-7 days, then decrease both dose and frequency to as needed. 3/26/2018 Yes Demetra Bhatt PA-C   enoxaparin (LOVENOX) 40 MG/0.4ML injection Inject 0.4 mLs (40 mg) Subcutaneous every 24 hours for 26 days 3/26/2018 at 1300 Yes Demetra Bhatt PA-C   Multiple Vitamins-Minerals (MULTIVITAMIN ADULT PO) Take by mouth every morning  3/20/2018 Yes Reported, Patient   gabapentin (NEURONTIN) 300 MG capsule Take 300 mg by mouth every morning  3/26/2018 at AM Yes Reported, Patient   omeprazole (PRILOSEC) 20 MG CR capsule Take 20 mg by mouth every morning  3/26/2018 at AM Yes Reported, Patient   lisinopril-hydrochlorothiazide (PRINZIDE/ZESTORETIC) 20-12.5 MG per tablet TAKE ONE TABLET BY MOUTH ONCE DAILY IN THE MORNING 3/26/2018 at AM Yes Reported, Patient   metFORMIN (GLUCOPHAGE) 1000 MG tablet Take 1,000 mg by mouth 2 times daily (with meals)  3/26/2018 at AM Yes Reported, Patient   simvastatin (ZOCOR) 20 MG tablet Take 20 mg by mouth At Bedtime  3/25/2018 at PM Yes Reported, Patient   citalopram (CELEXA) 40 MG tablet Take 40 mg by mouth every morning  3/26/2018 at AM Yes Reported, Patient   Ferrous Sulfate (IRON SUPPLEMENT PO) Take 65 mg by mouth 2 times daily (with meals)  3/20/2018 Yes Reported, Patient   Dulaglutide (TRULICITY SC) Inject 0.75 mg Subcutaneous once a week WED MORNING 3/14/2018 at AM Yes Reported, Patient         Medication history completed by: Aurelia De Santiago, VickieD

## 2018-03-27 NOTE — PLAN OF CARE
Problem: Patient Care Overview  Goal: Plan of Care/Patient Progress Review  Outcome: No Change  Patient admitted from the ED this afternoon with nausea, vomiting, diarrhea. Patient placed on enteric precautions, no stools since arrival on 7C, still need specimen for Cdiff. Patient complains of acidy feeling in stomach, gave protonix. Family at bedside. Got an antiemetic in ED.

## 2018-03-27 NOTE — ED NOTES
[facility], [place]   ED Nurse to Floor Handoff     Pranav Jackson is a 83 year old male who speaks English and lives with family members,  in a home  They arrived in the ED by car from home    ED Chief Complaint: Nausea, Vomiting, & Diarrhea (post op colon surgery )    ED Dx;   Final diagnoses:   Nausea and vomiting in adult   Diarrhea, unspecified type   Dehydration   Acute kidney injury (H)         Needed?: No    Allergies:   Allergies   Allergen Reactions     Penicillins Hives     Tolerated PO cephalosporin 7/2016   .  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)      PVD (peripheral vascular disease) with claudication (H)       Baseline Mental status: WDL  Current Mental Status changes: {Current Mental Status Changes:774003    Infection present or suspected this encounter: yes enteric  Sepsis suspected: No  Isolation type: Contact     Activity level - Baseline/Home:  Stand with Assist  Activity Level - Current:   Stand with Assist    Bariatric equipment needed?: No    In the ED these meds were given:   Medications   0.9% sodium chloride BOLUS (0 mLs Intravenous Stopped 3/27/18 0749)     Followed by   sodium chloride 0.9% infusion (not administered)   ondansetron (ZOFRAN) injection 4 mg (4 mg Intravenous Given 3/27/18 0635)   0.9% sodium chloride BOLUS (0 mLs Intravenous Stopped 3/27/18 0859)       Drips running?  No    Home pump  No    Current LDAs  Peripheral IV 03/27/18 Right Upper arm (Active)   Number of days:0       Incision/Surgical Site 03/21/18 Abdomen (Active)   Number of days:6       Labs  "results:   Labs Ordered and Resulted from Time of ED Arrival Up to the Time of Departure from the ED   CBC WITH PLATELETS DIFFERENTIAL - Abnormal; Notable for the following:        Result Value    WBC 17.4 (*)     RBC Count 4.14 (*)     Hemoglobin 12.0 (*)     Hematocrit 37.9 (*)     RDW 16.7 (*)     Absolute Neutrophil 14.9 (*)     Absolute Monocytes 1.4 (*)     All other components within normal limits   COMPREHENSIVE METABOLIC PANEL - Abnormal; Notable for the following:     Anion Gap 16 (*)     Glucose 205 (*)     Urea Nitrogen 38 (*)     Creatinine 1.35 (*)     GFR Estimate 50 (*)     All other components within normal limits   INR   PARTIAL THROMBOPLASTIN TIME   PERIPHERAL IV CATHETER   CLOSTRIDIUM DIFFICILE TOXIN B       Imaging Studies: No results found for this or any previous visit (from the past 24 hour(s)).    Recent vital signs:   /61  Pulse 100  Temp 98.1  F (36.7  C) (Oral)  Resp 18  Ht 1.702 m (5' 7\")  Wt 90.3 kg (199 lb)  SpO2 93%  BMI 31.17 kg/m2    Cardiac Rhythm: Normal Sinus  Pt needs tele? No  Skin/wound Issues: None    Code Status: Full Code    Pain control: good    Nausea control: good    Abnormal labs/tests/findings requiring intervention: leukocytosis, elevated creat s/t dehydration-vomiting and diarrhea    Family present during ED course? Yes   Family Comments/Social Situation comments: daughter and son at bedside    Tasks needing completion: stool sample    Leidy Nolasco, RN  asc-- [phone] [place] ED  [phone] [facility] ED      "

## 2018-03-28 LAB
ANION GAP SERPL CALCULATED.3IONS-SCNC: 11 MMOL/L (ref 3–14)
BUN SERPL-MCNC: 35 MG/DL (ref 7–30)
CALCIUM SERPL-MCNC: 8.4 MG/DL (ref 8.5–10.1)
CHLORIDE SERPL-SCNC: 109 MMOL/L (ref 94–109)
CO2 SERPL-SCNC: 21 MMOL/L (ref 20–32)
CREAT SERPL-MCNC: 0.82 MG/DL (ref 0.66–1.25)
ERYTHROCYTE [DISTWIDTH] IN BLOOD BY AUTOMATED COUNT: 16.5 % (ref 10–15)
GFR SERPL CREATININE-BSD FRML MDRD: 90 ML/MIN/1.7M2
GLUCOSE SERPL-MCNC: 141 MG/DL (ref 70–99)
HCT VFR BLD AUTO: 30.7 % (ref 40–53)
HGB BLD-MCNC: 9.5 G/DL (ref 13.3–17.7)
INTERPRETATION ECG - MUSE: NORMAL
MAGNESIUM SERPL-MCNC: 1.5 MG/DL (ref 1.6–2.3)
MAGNESIUM SERPL-MCNC: 2.5 MG/DL (ref 1.6–2.3)
MCH RBC QN AUTO: 28.3 PG (ref 26.5–33)
MCHC RBC AUTO-ENTMCNC: 30.9 G/DL (ref 31.5–36.5)
MCV RBC AUTO: 91 FL (ref 78–100)
PHOSPHATE SERPL-MCNC: 2.2 MG/DL (ref 2.5–4.5)
PLATELET # BLD AUTO: 321 10E9/L (ref 150–450)
POTASSIUM SERPL-SCNC: 3.3 MMOL/L (ref 3.4–5.3)
RBC # BLD AUTO: 3.36 10E12/L (ref 4.4–5.9)
SODIUM SERPL-SCNC: 142 MMOL/L (ref 133–144)
WBC # BLD AUTO: 7.7 10E9/L (ref 4–11)

## 2018-03-28 PROCEDURE — 83735 ASSAY OF MAGNESIUM: CPT | Performed by: STUDENT IN AN ORGANIZED HEALTH CARE EDUCATION/TRAINING PROGRAM

## 2018-03-28 PROCEDURE — 80048 BASIC METABOLIC PNL TOTAL CA: CPT | Performed by: STUDENT IN AN ORGANIZED HEALTH CARE EDUCATION/TRAINING PROGRAM

## 2018-03-28 PROCEDURE — 25000128 H RX IP 250 OP 636: Performed by: STUDENT IN AN ORGANIZED HEALTH CARE EDUCATION/TRAINING PROGRAM

## 2018-03-28 PROCEDURE — 85027 COMPLETE CBC AUTOMATED: CPT | Performed by: STUDENT IN AN ORGANIZED HEALTH CARE EDUCATION/TRAINING PROGRAM

## 2018-03-28 PROCEDURE — 84100 ASSAY OF PHOSPHORUS: CPT | Performed by: STUDENT IN AN ORGANIZED HEALTH CARE EDUCATION/TRAINING PROGRAM

## 2018-03-28 PROCEDURE — 83735 ASSAY OF MAGNESIUM: CPT | Performed by: COLON & RECTAL SURGERY

## 2018-03-28 PROCEDURE — A9270 NON-COVERED ITEM OR SERVICE: HCPCS | Mod: GY | Performed by: STUDENT IN AN ORGANIZED HEALTH CARE EDUCATION/TRAINING PROGRAM

## 2018-03-28 PROCEDURE — 25000132 ZZH RX MED GY IP 250 OP 250 PS 637: Mod: GY | Performed by: STUDENT IN AN ORGANIZED HEALTH CARE EDUCATION/TRAINING PROGRAM

## 2018-03-28 PROCEDURE — 25000125 ZZHC RX 250: Performed by: PHYSICIAN ASSISTANT

## 2018-03-28 PROCEDURE — 25000128 H RX IP 250 OP 636: Performed by: PHYSICIAN ASSISTANT

## 2018-03-28 PROCEDURE — 36415 COLL VENOUS BLD VENIPUNCTURE: CPT | Performed by: COLON & RECTAL SURGERY

## 2018-03-28 PROCEDURE — 12000008 ZZH R&B INTERMEDIATE UMMC

## 2018-03-28 PROCEDURE — 40000894 ZZH STATISTIC OT IP EVAL DEFER

## 2018-03-28 PROCEDURE — 36415 COLL VENOUS BLD VENIPUNCTURE: CPT | Performed by: STUDENT IN AN ORGANIZED HEALTH CARE EDUCATION/TRAINING PROGRAM

## 2018-03-28 PROCEDURE — 25000125 ZZHC RX 250: Performed by: STUDENT IN AN ORGANIZED HEALTH CARE EDUCATION/TRAINING PROGRAM

## 2018-03-28 RX ORDER — POTASSIUM CHLORIDE 750 MG/1
20-40 TABLET, EXTENDED RELEASE ORAL
Status: DISCONTINUED | OUTPATIENT
Start: 2018-03-28 | End: 2018-04-01 | Stop reason: HOSPADM

## 2018-03-28 RX ORDER — POTASSIUM CHLORIDE 1.5 G/1.58G
20-40 POWDER, FOR SOLUTION ORAL
Status: DISCONTINUED | OUTPATIENT
Start: 2018-03-28 | End: 2018-04-01 | Stop reason: HOSPADM

## 2018-03-28 RX ORDER — MAGNESIUM SULFATE HEPTAHYDRATE 40 MG/ML
4 INJECTION, SOLUTION INTRAVENOUS EVERY 4 HOURS PRN
Status: DISCONTINUED | OUTPATIENT
Start: 2018-03-28 | End: 2018-04-01 | Stop reason: HOSPADM

## 2018-03-28 RX ORDER — POTASSIUM CHLORIDE 29.8 MG/ML
20 INJECTION INTRAVENOUS
Status: DISCONTINUED | OUTPATIENT
Start: 2018-03-28 | End: 2018-04-01 | Stop reason: HOSPADM

## 2018-03-28 RX ORDER — POTASSIUM CL/LIDO/0.9 % NACL 10MEQ/0.1L
10 INTRAVENOUS SOLUTION, PIGGYBACK (ML) INTRAVENOUS
Status: DISCONTINUED | OUTPATIENT
Start: 2018-03-28 | End: 2018-04-01 | Stop reason: HOSPADM

## 2018-03-28 RX ORDER — POTASSIUM CHLORIDE 7.45 MG/ML
10 INJECTION INTRAVENOUS
Status: DISCONTINUED | OUTPATIENT
Start: 2018-03-28 | End: 2018-03-28 | Stop reason: RX

## 2018-03-28 RX ORDER — ASPIRIN 81 MG/1
81 TABLET, CHEWABLE ORAL ONCE
Status: COMPLETED | OUTPATIENT
Start: 2018-03-28 | End: 2018-03-28

## 2018-03-28 RX ORDER — PANTOPRAZOLE SODIUM 40 MG/1
40 TABLET, DELAYED RELEASE ORAL EVERY MORNING
Status: DISCONTINUED | OUTPATIENT
Start: 2018-03-29 | End: 2018-03-29

## 2018-03-28 RX ORDER — ASPIRIN 325 MG
325 TABLET ORAL DAILY
Status: DISCONTINUED | OUTPATIENT
Start: 2018-03-29 | End: 2018-04-01 | Stop reason: HOSPADM

## 2018-03-28 RX ADMIN — METRONIDAZOLE 500 MG: 500 INJECTION, SOLUTION INTRAVENOUS at 21:11

## 2018-03-28 RX ADMIN — Medication 10 MEQ: at 19:59

## 2018-03-28 RX ADMIN — METRONIDAZOLE 500 MG: 500 INJECTION, SOLUTION INTRAVENOUS at 01:36

## 2018-03-28 RX ADMIN — METRONIDAZOLE 500 MG: 500 INJECTION, SOLUTION INTRAVENOUS at 08:37

## 2018-03-28 RX ADMIN — ASPIRIN 81 MG CHEWABLE TABLET 81 MG: 81 TABLET CHEWABLE at 08:37

## 2018-03-28 RX ADMIN — Medication 10 MEQ: at 17:13

## 2018-03-28 RX ADMIN — Medication 10 MEQ: at 12:38

## 2018-03-28 RX ADMIN — Medication 125 MG: at 12:38

## 2018-03-28 RX ADMIN — MAGNESIUM SULFATE HEPTAHYDRATE 4 G: 40 INJECTION, SOLUTION INTRAVENOUS at 10:08

## 2018-03-28 RX ADMIN — ONDANSETRON 4 MG: 2 INJECTION INTRAMUSCULAR; INTRAVENOUS at 21:16

## 2018-03-28 RX ADMIN — Medication 125 MG: at 20:00

## 2018-03-28 RX ADMIN — METRONIDAZOLE 500 MG: 500 INJECTION, SOLUTION INTRAVENOUS at 14:15

## 2018-03-28 RX ADMIN — ENOXAPARIN SODIUM 40 MG: 40 INJECTION SUBCUTANEOUS at 10:08

## 2018-03-28 RX ADMIN — Medication 10 MEQ: at 15:25

## 2018-03-28 RX ADMIN — Medication 125 MG: at 15:59

## 2018-03-28 RX ADMIN — Medication 125 MG: at 08:37

## 2018-03-28 RX ADMIN — PANTOPRAZOLE SODIUM 40 MG: 40 INJECTION, POWDER, FOR SOLUTION INTRAVENOUS at 08:37

## 2018-03-28 NOTE — PLAN OF CARE
Problem: Patient Care Overview  Goal: Plan of Care/Patient Progress Review  Outcome: Improving  VSS. Denies pain. PIV infusing MIVFs and abx. Phos 2.2 and to be replaced. Mag 1.5 and was replaced; recheck scheduled. K+ 3.3 and 2/4 bags hung and need recheck to be scheduled 2hr after last bag hung. Up with SBA ambulating to the bathroom; pt and family report 2 BMs today. Diet advanced to full liquids and low fiber for dinner tonight; if able. Family supportive at bedside. Up ambulated in hallway x1. Continue with POC.

## 2018-03-28 NOTE — PLAN OF CARE
Problem: Patient Care Overview  Goal: Plan of Care/Patient Progress Review  PT 7C: Defer - PT orders acknowledged and appreciated. Pt with recent admission and did not require IP PT at that time. Pt continues to mobilize with personal 4WW with family and nursing staff safely and at his baseline. Pt continues to have good insight into deficits. Would recommend continued ambulation at least 4x/day with nursing staff to reduce his risk for deconditioning, especially in the setting of new diarrhea. Pt with no acute IP PT needs at this time and will defer evaluation. Will complete orders at this time. Please re-order with change in status.

## 2018-03-28 NOTE — PLAN OF CARE
Problem: Patient Care Overview  Goal: Plan of Care/Patient Progress Review  Outcome: No Change  VSS. Pt reported pain in the AM, but declined medication. Reported improvement of nausea. No emesis overnight. NPO. Up SBA. Son keeps pt company and assists. Green, watery stools. Pt wears hearing aids, currently not in. Enteric precautions maintained. Voiding spontaneously. Continue current plan of care.

## 2018-03-28 NOTE — PROGRESS NOTES
Surgery Progress Note  3/28/2018     Subjective:  - ALAINA overnight.  - No c/o pain. Reports having had 7 BM overnight. Feels better today than yesterday.    Objective:  Temp:  [96.1  F (35.6  C)-98.8  F (37.1  C)] 96.1  F (35.6  C)  Pulse:  [] 74  Resp:  [16-18] 16  BP: (129-158)/(52-76) 145/52  SpO2:  [92 %-97 %] 94 %  I/O last 3 completed shifts:  In: 225 [I.V.:225]  Out: 380 [Urine:80; Stool:300]    Gen: Awake, alert, NAD  Resp: NLB on RA  Abd: Distended but soft, less tender on exam than yesterday; incisions healing well  Ext: WWP    BMP    Recent Labs  Lab 03/27/18  0558 03/22/18  2245 03/22/18  0855     --  134   POTASSIUM 3.5  --  4.1   CHLORIDE 98  --  100   MIKALA 9.8  --  8.4*   CO2 22  --  23   BUN 38*  --  14   CR 1.35*  --  0.89   *  --  169*   MAG  --  2.2 1.4*   PHOS  --   --  3.6     CBC    Recent Labs  Lab 03/27/18  0558 03/24/18  0729 03/23/18  0747 03/22/18  0855 03/21/18  1434   WBC 17.4*  --   --  12.7*  --    RBC 4.14*  --   --  3.82*  --    HGB 12.0*  --  10.5* 10.7*  --    HCT 37.9*  --   --  35.3*  --    MCV 92  --   --  92  --    MCH 29.0  --   --  28.0  --    MCHC 31.7  --   --  30.3*  --    RDW 16.7*  --   --  17.7*  --     289  --  338 315     INR    Recent Labs  Lab 03/27/18  0558   INR 1.08      AST/ALT & Alk Phos    Recent Labs  Lab 03/27/18  0558   AST 21   ALT 32   ALKPHOS 72     Bili  Recent Labs   Lab Test  03/27/18   0558   BILITOTAL  1.3       A/P: Pranav Jackson is a 83 year old male s/p lap R hemicolectomy for colon cancer 3/21, d/c to home 3/24, readmitted 3/27 for profuse diarrhea, found to be C. diff positive.  - Check labs this AM - if WBC trending down, OK for ADAT  - Will resume lovenox & ASA if hgb & Cr stable  - Continue PO vanc & IV flagyl  - Continue MIVF  - Continue IV protonix  - Continue trending labs      D/w chief resident +/- staff.    Kendra Junior MD/MPH  Plastic Surgery PGY1

## 2018-03-28 NOTE — DISCHARGE SUMMARY
[facility]  Discharge Summary  Colon and Rectal Surgery     Pranav Jackson MRN# [id number]   YOB: 1934 Age: 83 year old     Date of Admission:  3/27/2018  Date of Discharge::  4/1/2018   Admitting Physician:  Brandin Ibarra MD  Discharge Physician:  Brandin Ibarra MD  Primary Care Physician:        Isacc Mcgrath          Admission Diagnoses:   Dehydration [E86.0]  Nausea and vomiting in adult [R11.2]  Acute kidney injury (H) [N17.9]  Diarrhea, unspecified type [R19.7]  Leukocytosis          Discharge Diagnosis:   Dehydration [E86.0]  Nausea and vomiting in adult [R11.2]  Acute kidney injury (H) [N17.9]  Diarrhea, unspecified type [R19.7]  Leukocytosis. resolved  C.diff diarrhea, improving         Procedures:   None         Consultations:   MEDICATION HISTORY IP PHARMACY CONSULT  PHYSICAL THERAPY ADULT IP CONSULT  OCCUPATIONAL THERAPY ADULT IP CONSULT  VASCULAR ACCESS CARE ADULT IP CONSULT  VASCULAR ACCESS CARE ADULT IP CONSULT         Imaging Studies:     Results for orders placed or performed during the hospital encounter of 03/27/18   XR Abdomen 2 Views    Narrative    XR ABDOMEN 2 VW  3/27/2018 9:29 AM      HISTORY: abdominal pain, diarrhea, s/p right hemicolectomy;   COMPARISON: Chest CT 3/7/2018, abdominal CT 1/29/2018    FINDINGS:   Single upright and 2 supine views of the abdomen and pelvis. The lung  bases are clear. There is no free air. There are several loops of  abnormal small bowel dilation, the largest measuring nearly 7 cm in  diameter. There is a minimal amount of colonic gas present.  Postsurgical findings are seen in the right upper quadrant consistent  with history of right hemicolectomy. There are a few tiny  calcifications visualized at the right upper quadrant, which may be  vascular in etiology. Aortic calcifications also noted.  Redemonstration of extensive degenerative changes in the lumbar spine  and both hips, left greater than right. Patient is status  post  posterior spinal fusion in the lower lumbar spine.      Impression    IMPRESSION:   Several loops of abnormal small bowel dilation, the largest measuring  nearly 7 cm in diameter. Minimal colonic gas is present, concerning  for obstruction. No free air.    TUNG MODI MD          Medications Prior to Admission:     Prescriptions Prior to Admission   Medication Sig Dispense Refill Last Dose     acetaminophen (TYLENOL) 500 MG tablet Take 2 tablets (1,000 mg) by mouth 4 times daily As scheduled for the next 5-7 days, then decrease both dose and frequency to as needed. 50 tablet 0 3/26/2018 at PM     enoxaparin (LOVENOX) 40 MG/0.4ML injection Inject 0.4 mLs (40 mg) Subcutaneous every 24 hours for 26 days 10.4 mL 0 3/26/2018 at 1300     Multiple Vitamins-Minerals (MULTIVITAMIN ADULT PO) Take by mouth every morning    3/20/2018     gabapentin (NEURONTIN) 300 MG capsule Take 300 mg by mouth every morning    3/26/2018 at AM     omeprazole (PRILOSEC) 20 MG CR capsule Take 20 mg by mouth every morning    3/26/2018 at AM     metFORMIN (GLUCOPHAGE) 1000 MG tablet Take 1,000 mg by mouth 2 times daily (with meals)    3/26/2018 at AM     simvastatin (ZOCOR) 20 MG tablet Take 20 mg by mouth At Bedtime    3/25/2018 at PM     citalopram (CELEXA) 40 MG tablet Take 40 mg by mouth every morning    3/26/2018 at AM     Ferrous Sulfate (IRON SUPPLEMENT PO) Take 65 mg by mouth 2 times daily (with meals)    3/20/2018     Dulaglutide (TRULICITY SC) Inject 0.75 mg Subcutaneous once a week WED MORNING   3/14/2018 at AM     [DISCONTINUED] oxyCODONE IR (ROXICODONE) 5 MG tablet Take 1 tablet (5 mg) by mouth every 6 hours as needed for severe pain 28 tablet 0 3/24/2018 at not taking at home     [DISCONTINUED] traMADol (ULTRAM) 50 MG tablet Take 0.5-1 tablets (25-50 mg) by mouth every 6 hours as needed for moderate pain 28 tablet 0 3/25/2018 at 1600     [DISCONTINUED] ibuprofen (ADVIL/MOTRIN) 400 MG tablet Take 1-2 tablets (400-800 mg) by  mouth 3 times daily As scheduled for the next 5-7 days, then decrease both dose and frequency to as needed. 50 tablet 0 3/26/2018     [DISCONTINUED] lisinopril-hydrochlorothiazide (PRINZIDE/ZESTORETIC) 20-12.5 MG per tablet TAKE ONE TABLET BY MOUTH ONCE DAILY IN THE MORNING   3/26/2018 at AM          Discharge Medications:     Current Discharge Medication List      START taking these medications    Details   vancomycin (VANOCIN) 50 mg/mL LIQD solution Take 2.5 mLs (125 mg) by mouth 4 times daily for 14 days  Qty: 140 mL, Refills: 0    Associated Diagnoses: C. difficile colitis      aspirin 325 MG tablet Take 1 tablet (325 mg) by mouth daily  Qty: 120 tablet      lisinopril (PRINIVIL/ZESTRIL) 20 MG tablet Take 1 tablet (20 mg) by mouth daily for 7 days  Qty: 7 tablet, Refills: 0    Associated Diagnoses: Acute kidney injury (H); Dehydration; Diarrhea, unspecified type; Hypertension, unspecified type         CONTINUE these medications which have NOT CHANGED    Details   acetaminophen (TYLENOL) 500 MG tablet Take 2 tablets (1,000 mg) by mouth 4 times daily As scheduled for the next 5-7 days, then decrease both dose and frequency to as needed.  Qty: 50 tablet, Refills: 0    Associated Diagnoses: S/P right hemicolectomy      enoxaparin (LOVENOX) 40 MG/0.4ML injection Inject 0.4 mLs (40 mg) Subcutaneous every 24 hours for 26 days  Qty: 10.4 mL, Refills: 0    Associated Diagnoses: S/P right hemicolectomy      Multiple Vitamins-Minerals (MULTIVITAMIN ADULT PO) Take by mouth every morning       gabapentin (NEURONTIN) 300 MG capsule Take 300 mg by mouth every morning       omeprazole (PRILOSEC) 20 MG CR capsule Take 20 mg by mouth every morning       metFORMIN (GLUCOPHAGE) 1000 MG tablet Take 1,000 mg by mouth 2 times daily (with meals)       simvastatin (ZOCOR) 20 MG tablet Take 20 mg by mouth At Bedtime       citalopram (CELEXA) 40 MG tablet Take 40 mg by mouth every morning       Ferrous Sulfate (IRON SUPPLEMENT PO) Take 65  mg by mouth 2 times daily (with meals)       Dulaglutide (TRULICITY SC) Inject 0.75 mg Subcutaneous once a week WED MORNING         STOP taking these medications       oxyCODONE IR (ROXICODONE) 5 MG tablet Comments:   Reason for Stopping:         traMADol (ULTRAM) 50 MG tablet Comments:   Reason for Stopping:         ibuprofen (ADVIL/MOTRIN) 400 MG tablet Comments:   Reason for Stopping:         lisinopril-hydrochlorothiazide (PRINZIDE/ZESTORETIC) 20-12.5 MG per tablet Comments:   Reason for Stopping:                   Brief History of Illness:   83 year old male with PMH of carotid artery stenosis (70% bilat), PVD with claudication, HTN, DM2, BPH, h/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  On 3/21/2018 underwent lap right hemicolectomy with extensive TALIA, discharged on 3/24/2018.  Readmitted on 3/27/2018 with nausea, vomiting, profuse diarrhea, inability to tolerate orals.  Found to have a leukocytosis, elevated creatinine but afebrile with stable vitals.  c.diff returned positive.            Hospital Course:   Pt was fluid resuscitated.  Pt was able to provide a stool sample which returned positive for c.diff.  Pt was started on both IV flagyl and PO vanc for concern of possible ileus.  Pt's labs quickly improved WBC of 17.4 decreased to 7.7.  Creat 1.35 decreased back to baseline 0.82.  Electrolytes were repleted.  Diet was slowly advanced for which pt tolerated.  Pt's diarrhea improved.   Pt was restarted on home medications including aspirin 325mg daily.  Pt was resumed on his home lisinopril, however pt was instructed to hold his HCTZ for an additional week until oral in take improves and diarrhea subsides. Pt was given a 7 day prescription for plain lisinopril as he has a combination pill of lisinopril-HCTZ at home.      Patient is to follow up in the Colon and Rectal Surgery Clinic in 1 week with Emily Solorio NP and then with Dr. Ibarra in 2-3  "weeks after.          Day of Discharge Physical Exam:   Blood pressure 152/60, pulse 80, temperature 96.2  F (35.7  C), temperature source Oral, resp. rate 14, height 1.702 m (5' 7\"), weight 86.8 kg (191 lb 4.8 oz), SpO2 96 %.    Gen: AAOx3, NAD  Pulm: Non-labored breathing  Abd: Soft, mildly distended, appropriately tender, no guarding/rebound   Incision C/D/I   Ext:  Warm and well-perfused         Final Pathology Result:   From prior hospitalization last week -     Patient Name: SOCORRO STEINBERG   MR#: [id number]   Specimen #: [id number]   Collected: 3/21/2018   Received: 3/21/2018   Reported: 3/27/2018 13:01   Ordering Phy(s): AIDEE BUSTILLOS     For improved result formatting, select 'View Enhanced Report Format' under    Linked Documents section.     SPECIMEN(S):   Right hemicolectomy     FINAL DIAGNOSIS:   COLON TERMINAL ILEUM, APPENDIX, RIGHT HEMICOLECTOMY:   - Invasive adenocarcinoma, poorly differentiated, with mucinous and signet    ring cell features   - Tumor invades pericolonic fatty tissue   - Tumor size: 3.4 cm   - Lymphovascular invasion identified   - Metastatic adenocarcinoma in ten out of twenty two lymph nodes (10/22)   - Margins free of involvement   - Tubular adenoma x2   - Appendix with no evidence of malignancy   - Intact expression of mismatch repair proteins by immunohistochemistry     Report Name: Colon and Rectum - Resection        Status: Submitted Checklist Inst: 1      Last Updated By: Sita Gill M.D., [facility], 03/27/2018   12:53:08   Part(s) Involved:   A: Right hemicolectomy     Synoptic Report:     SPECIMEN     Procedure:         - Right hemicolectomy     TUMOR     Tumor Site:         - Cecum     Histologic Type:         - Adenocarcinoma       Histologic Type Comments: with mucinous and signet ring features     Histologic Grade:         - G3: Poorly differentiated     Tumor Size: 3.4 Centimeters (cm)     Tumor Deposits:         - Present       Number of Deposits: Cannot be " determined - multiple     Tumor Extent       Tumor Extension:           - Tumor invades through the muscularis propria into pericolorectal           tissue       Macroscopic Tumor Perforation:           - Not identified     Accessory Findings       Lymphovascular Invasion:           - Present         Lymphovascular Invasion Type:             - Small vessel lymphovascular invasion       Perineural Invasion:           - Not identified       Tumor Budding         Tumor Budding Score:             - High score (10 or more)       Type of Polyp in Which Invasive Carcinoma Arose:           - None identified       Treatment Effect:           - No known presurgical therapy     MARGINS     Margins:         - All margins are uninvolved by invasive carcinoma, high-grade   dysplasia,         intramucosal adenocarcinoma, and adenoma       Margins Examined:           - Proximal           - Distal           - Radial or Mesenteric       Distance of Tumor from Distal Margin: 6 Centimeters (cm)     LYMPH NODES     Number of Lymph Nodes Involved: 10     Number of Lymph Nodes Examined: 22     PATHOLOGIC STAGE CLASSIFICATION (PTNM, AJCC 8TH EDITION)     Primary Tumor (pT):         - pT3: Tumor invades through the muscularis propria into   pericolorectal         tissues     Regional Lymph Nodes (pN):         - pN2b: Seven or more regional lymph nodes are positive     ADDITIONAL FINDINGS     Additional Pathologic Findings:         - Adenoma(s)     2017 June AJCC 8th Edition CAP Annual Release     Report Name: Colon and Rectum Biomarker        Status: Submitted Checklist Inst: 2      Last Updated By: Donnell Castillo, 03/26/2018 09:46:19   Part(s) Involved:   A: Right hemicolectomy     Synoptic Report:     RESULTS     Mismatch Repair       Immunohistochemistry (IHC) Testing For Mismatch Repair (MMR) Proteins:           - MLH1           - MSH2           - MSH6           - PMS2           - Background nonneoplastic tissue / internal control with  intact   nuclear           expression         MLH1 Result:             - Intact nuclear expression         MSH2 Result:             - Intact nuclear expression         MSH6 Result:             - Intact nuclear expression         PMS2 Result:             - Intact nuclear expression         IHC Interpretation:             - No loss of nuclear expression of MMR proteins: low probability    of             MSI-H     2017 June AJCC 8th Edition CAP Annual Release     I have personally reviewed all specimens and/or slides, including the   listed special stains, and used them   with my medical judgement to determine or confirm the final diagnosis.     Electronically signed out by:     Sita Gill M.D., [facility]            Discharge Instructions and Follow-Up:       Discharge Procedure Orders  Reason for your hospital stay   Order Comments: You were admitted for nausea, vomiting, diarrhea.  You were found to have c.diff diarrhea and also an ileus.     Adult [facility] Follow-up and recommended labs and tests   Order Comments: DIET  -Low Residue Diet for at least 4-6 weeks unless cleared by Colorectal surgery.  No raw vegetables, fruit skins, fibrous foods that require a lot of chewing, nuts, seeds, corn, popcorn.   -We recommend eating slowly, chewing thoroughly, eating small frequent meals throughout the day  -Stay well hydrated.  (Dark concentrated urine indicates dehydration.)     ACTIVITY  -No lifting, pushing, pulling greater than 10 lbs and no strenuous exercise for 6 weeks   -No driving while on narcotic analgesics (i.e. Percocet, oxycodone, Vicodin)  -No driving until you are able to fully twist to both sides or slam on brakes quickly and without any pain    WOUND CLINIC  -Inspect your wounds daily for signs of infection (increased redness, drainage, pain)  -Keep your wound clean and dry  -You may shower, but do not soak in tub or pool    NOTIFY  Please contact Jordi Marcus LPN, Yakelin Cisneros RN or Karishma Wilde RN  at [phone] for problems after discharge such as:  -Temperature > 101F, chills, rigors, dizziness  -Redness around or purulent drainage from wound  -Inability to tolerate diet, nausea or vomiting  -You stop passing gas, develop significant bloating, abdominal pain  -Have blood in stools/vomit  -Have severe diarrhea/constipation  -Any other questions or concerns.  - At nights (after 4:30pm), on weekends, or if urgent, call [phone] and ask the  to speak with the on-call Colorectal Surgery resident or fellow      Medication Instructions  Some of your medications may have changed. Please take only prescribed and resumed medications     FOLLOW-UP  1.  You will need to follow-up with Emily Solorio NP in the Colon and Rectal Surgery clinic in 1 week(s) and then with CRS Staff: Dr. Ibarra in 2-3 weeks after.  Please contact our clinic scheduler Renita Beauchamp (phone # [phone]) if you have not heard from our clinic in 3 business days afer discharge to schedule a follow-up appointment.     2.  Follow up with your primary care provider in 1-2 weeks after discharge from the hospital to review this hospitalization.     3.  We have prescribed a weeks worth of your blood pressure medication Lisinopril.  Please hold your usual combination pill of Lisinopril-Hydrochlorothiazide.  You can resume your combination pill when your diarrhea has subsided.  Hydrochlorothiazide will cause electrolyte losses therefore we would like you to hold this while you have diarrhea.       Appointments on [place] and/or [facility] (with [facility] or [facility] provider or service). Call [phone] if you haven't heard regarding these appointments within 7 days of discharge.     Activity   Order Comments: Your activity upon discharge:   -No lifting, pushing, pulling greater than 10 lbs and no strenuous exercise for 6 weeks   -No driving while on narcotic analgesics (i.e. Percocet, oxycodone, Vicodin)  -No driving until  you are able to fully twist to both sides or slam on brakes quickly and without any pain   Order Specific Question Answer Comments   Is discharge order? Yes      Full Code     Diet   Order Comments: Follow this diet upon discharge:   -Low Residue Diet for at least 4-6 weeks unless cleared by Colorectal surgery.  No raw vegetables, fruit skins, fibrous foods that require a lot of chewing, nuts, seeds, corn, popcorn.   -We recommend eating slowly, chewing thoroughly, eating small frequent meals throughout the day  -Stay well hydrated.  (Dark concentrated urine indicates dehydration.)   Order Specific Question Answer Comments   Is discharge order? Yes               Home Health Care:   Not needed           Discharge Disposition:   Discharged to home      Condition at discharge: Stable

## 2018-03-28 NOTE — PLAN OF CARE
Problem: Patient Care Overview  Goal: Plan of Care/Patient Progress Review  OT 7C: OT orders received, met with pt and his dtr and son this date.  Pt was previously hospitalized s/p hemicolectomy and was discharged 3/24 home with assist from family and friends.  Pt now admitted for c-diff.  Pt and family reporting ADL and mobility were going well at home prior to illness.  Pt ambulating with his own 4WW in the hallway with family and staff.  Pt with good safety awareness with mobility and able to maintain abdominal precautions.  Pt needing intermittent assist for LB dressing however this is baseline. Pt and family plan to d/c back home with continued assist from family and friends; pt and family with no concerns at this time.  Reviewed AE/DME options should pt need any, however family stating pt has no needs at this time.  Pt with no new deficits this admission.  Pt safe to return home with assist from family and friends when medically stable.  No inpatient OT needs at this time, will defer OT and complete orders.  Thank you for this referral, please re-consult if there is change in pt status.

## 2018-03-28 NOTE — PLAN OF CARE
Problem: Patient Care Overview  Goal: Plan of Care/Patient Progress Review  Outcome: No Change  Patient with watery stools. Lab confirmed Patient C-Diff positive. Continue Enteric Isolation. Patient and family given C-Diff education handout. Patient c/o intermittent nausea. No emesis. NPO. MIV @ 100 ml/hr. Patient started on Flagyl IV and Vanco po. VSS. Patient in good spirits. Son is staying tonight. Continue to monitor. Continue c POC.

## 2018-03-29 LAB
ANION GAP SERPL CALCULATED.3IONS-SCNC: 8 MMOL/L (ref 3–14)
BUN SERPL-MCNC: 26 MG/DL (ref 7–30)
CALCIUM SERPL-MCNC: 8.4 MG/DL (ref 8.5–10.1)
CHLORIDE SERPL-SCNC: 110 MMOL/L (ref 94–109)
CO2 SERPL-SCNC: 23 MMOL/L (ref 20–32)
CREAT SERPL-MCNC: 0.76 MG/DL (ref 0.66–1.25)
ERYTHROCYTE [DISTWIDTH] IN BLOOD BY AUTOMATED COUNT: 16.5 % (ref 10–15)
GFR SERPL CREATININE-BSD FRML MDRD: >90 ML/MIN/1.7M2
GLUCOSE BLDC GLUCOMTR-MCNC: 119 MG/DL (ref 70–99)
GLUCOSE BLDC GLUCOMTR-MCNC: 142 MG/DL (ref 70–99)
GLUCOSE BLDC GLUCOMTR-MCNC: 159 MG/DL (ref 70–99)
GLUCOSE SERPL-MCNC: 171 MG/DL (ref 70–99)
HCT VFR BLD AUTO: 33.6 % (ref 40–53)
HGB BLD-MCNC: 10.4 G/DL (ref 13.3–17.7)
MAGNESIUM SERPL-MCNC: 2.1 MG/DL (ref 1.6–2.3)
MCH RBC QN AUTO: 28.5 PG (ref 26.5–33)
MCHC RBC AUTO-ENTMCNC: 31 G/DL (ref 31.5–36.5)
MCV RBC AUTO: 92 FL (ref 78–100)
PHOSPHATE SERPL-MCNC: 2.1 MG/DL (ref 2.5–4.5)
PLATELET # BLD AUTO: 367 10E9/L (ref 150–450)
POTASSIUM SERPL-SCNC: 3.4 MMOL/L (ref 3.4–5.3)
POTASSIUM SERPL-SCNC: 3.8 MMOL/L (ref 3.4–5.3)
RBC # BLD AUTO: 3.65 10E12/L (ref 4.4–5.9)
SODIUM SERPL-SCNC: 141 MMOL/L (ref 133–144)
WBC # BLD AUTO: 7.9 10E9/L (ref 4–11)

## 2018-03-29 PROCEDURE — 84100 ASSAY OF PHOSPHORUS: CPT | Performed by: STUDENT IN AN ORGANIZED HEALTH CARE EDUCATION/TRAINING PROGRAM

## 2018-03-29 PROCEDURE — 25000128 H RX IP 250 OP 636: Performed by: PHYSICIAN ASSISTANT

## 2018-03-29 PROCEDURE — A9270 NON-COVERED ITEM OR SERVICE: HCPCS | Mod: GY | Performed by: STUDENT IN AN ORGANIZED HEALTH CARE EDUCATION/TRAINING PROGRAM

## 2018-03-29 PROCEDURE — 83735 ASSAY OF MAGNESIUM: CPT | Performed by: STUDENT IN AN ORGANIZED HEALTH CARE EDUCATION/TRAINING PROGRAM

## 2018-03-29 PROCEDURE — 80048 BASIC METABOLIC PNL TOTAL CA: CPT | Performed by: STUDENT IN AN ORGANIZED HEALTH CARE EDUCATION/TRAINING PROGRAM

## 2018-03-29 PROCEDURE — 25000125 ZZHC RX 250: Performed by: STUDENT IN AN ORGANIZED HEALTH CARE EDUCATION/TRAINING PROGRAM

## 2018-03-29 PROCEDURE — 25000128 H RX IP 250 OP 636: Performed by: STUDENT IN AN ORGANIZED HEALTH CARE EDUCATION/TRAINING PROGRAM

## 2018-03-29 PROCEDURE — 25000131 ZZH RX MED GY IP 250 OP 636 PS 637: Mod: GY | Performed by: PHYSICIAN ASSISTANT

## 2018-03-29 PROCEDURE — 00000146 ZZHCL STATISTIC GLUCOSE BY METER IP

## 2018-03-29 PROCEDURE — 25000132 ZZH RX MED GY IP 250 OP 250 PS 637: Mod: GY | Performed by: STUDENT IN AN ORGANIZED HEALTH CARE EDUCATION/TRAINING PROGRAM

## 2018-03-29 PROCEDURE — 85027 COMPLETE CBC AUTOMATED: CPT | Performed by: STUDENT IN AN ORGANIZED HEALTH CARE EDUCATION/TRAINING PROGRAM

## 2018-03-29 PROCEDURE — 84132 ASSAY OF SERUM POTASSIUM: CPT | Performed by: COLON & RECTAL SURGERY

## 2018-03-29 PROCEDURE — 36415 COLL VENOUS BLD VENIPUNCTURE: CPT | Performed by: COLON & RECTAL SURGERY

## 2018-03-29 PROCEDURE — 36415 COLL VENOUS BLD VENIPUNCTURE: CPT | Performed by: STUDENT IN AN ORGANIZED HEALTH CARE EDUCATION/TRAINING PROGRAM

## 2018-03-29 PROCEDURE — A9270 NON-COVERED ITEM OR SERVICE: HCPCS | Mod: GY | Performed by: PHYSICIAN ASSISTANT

## 2018-03-29 PROCEDURE — 12000008 ZZH R&B INTERMEDIATE UMMC

## 2018-03-29 PROCEDURE — 25000132 ZZH RX MED GY IP 250 OP 250 PS 637: Mod: GY | Performed by: PHYSICIAN ASSISTANT

## 2018-03-29 RX ORDER — CALCIUM CARBONATE 500 MG/1
500-1000 TABLET, CHEWABLE ORAL 3 TIMES DAILY PRN
Status: DISCONTINUED | OUTPATIENT
Start: 2018-03-29 | End: 2018-04-01 | Stop reason: HOSPADM

## 2018-03-29 RX ORDER — DEXTROSE MONOHYDRATE 25 G/50ML
25-50 INJECTION, SOLUTION INTRAVENOUS
Status: DISCONTINUED | OUTPATIENT
Start: 2018-03-29 | End: 2018-04-01 | Stop reason: HOSPADM

## 2018-03-29 RX ORDER — NICOTINE POLACRILEX 4 MG
15-30 LOZENGE BUCCAL
Status: DISCONTINUED | OUTPATIENT
Start: 2018-03-29 | End: 2018-04-01 | Stop reason: HOSPADM

## 2018-03-29 RX ORDER — LISINOPRIL 20 MG/1
20 TABLET ORAL DAILY
Status: DISCONTINUED | OUTPATIENT
Start: 2018-03-29 | End: 2018-04-01 | Stop reason: HOSPADM

## 2018-03-29 RX ADMIN — Medication 125 MG: at 07:57

## 2018-03-29 RX ADMIN — PROCHLORPERAZINE EDISYLATE 5 MG: 5 INJECTION INTRAMUSCULAR; INTRAVENOUS at 15:11

## 2018-03-29 RX ADMIN — ASPIRIN 325 MG ORAL TABLET 325 MG: 325 PILL ORAL at 07:57

## 2018-03-29 RX ADMIN — ENOXAPARIN SODIUM 40 MG: 40 INJECTION SUBCUTANEOUS at 10:20

## 2018-03-29 RX ADMIN — METRONIDAZOLE 500 MG: 500 INJECTION, SOLUTION INTRAVENOUS at 03:26

## 2018-03-29 RX ADMIN — PANTOPRAZOLE SODIUM 40 MG: 40 TABLET, DELAYED RELEASE ORAL at 07:57

## 2018-03-29 RX ADMIN — OMEPRAZOLE 20 MG: 20 CAPSULE, DELAYED RELEASE ORAL at 18:09

## 2018-03-29 RX ADMIN — Medication 125 MG: at 22:07

## 2018-03-29 RX ADMIN — Medication 0.2 MG: at 01:10

## 2018-03-29 RX ADMIN — ONDANSETRON 4 MG: 2 INJECTION INTRAMUSCULAR; INTRAVENOUS at 14:19

## 2018-03-29 RX ADMIN — POTASSIUM PHOSPHATE, MONOBASIC AND POTASSIUM PHOSPHATE, DIBASIC 15 MMOL: 224; 236 INJECTION, SOLUTION INTRAVENOUS at 12:40

## 2018-03-29 RX ADMIN — METRONIDAZOLE 500 MG: 500 INJECTION, SOLUTION INTRAVENOUS at 08:57

## 2018-03-29 RX ADMIN — INSULIN ASPART 1 UNITS: 100 INJECTION, SOLUTION INTRAVENOUS; SUBCUTANEOUS at 13:24

## 2018-03-29 RX ADMIN — METRONIDAZOLE 500 MG: 500 INJECTION, SOLUTION INTRAVENOUS at 22:08

## 2018-03-29 RX ADMIN — Medication 125 MG: at 12:40

## 2018-03-29 RX ADMIN — METRONIDAZOLE 500 MG: 500 INJECTION, SOLUTION INTRAVENOUS at 16:28

## 2018-03-29 RX ADMIN — Medication 125 MG: at 18:05

## 2018-03-29 RX ADMIN — POTASSIUM PHOSPHATE, MONOBASIC AND POTASSIUM PHOSPHATE, DIBASIC 15 MMOL: 224; 236 INJECTION, SOLUTION INTRAVENOUS at 00:56

## 2018-03-29 RX ADMIN — SODIUM CHLORIDE, POTASSIUM CHLORIDE, SODIUM LACTATE AND CALCIUM CHLORIDE: 600; 310; 30; 20 INJECTION, SOLUTION INTRAVENOUS at 00:39

## 2018-03-29 RX ADMIN — Medication 10 MEQ: at 05:40

## 2018-03-29 RX ADMIN — LISINOPRIL 20 MG: 20 TABLET ORAL at 07:57

## 2018-03-29 RX ADMIN — Medication 10 MEQ: at 07:05

## 2018-03-29 NOTE — PROGRESS NOTES
Colorectal Surgery Progress Note  POD#2      Subjective:  Attempted chicken soup last night and became nauseated.  Continues with loose stools.      Vitals:  Vitals:    03/28/18 1505 03/28/18 2350 03/29/18 0730 03/29/18 0859   BP: 156/56 153/62 179/65 147/54   BP Location: Left arm Left arm Left arm    Pulse: 78 84 82 84   Resp: 16 16 16    Temp: 97.8  F (36.6  C) 97.5  F (36.4  C) 97  F (36.1  C)    TempSrc: Oral Axillary Oral    SpO2: 98% 96% 97%    Weight:       Height:         I/O:  I/O last 3 completed shifts:  In: 3571.67 [P.O.:240; I.V.:3331.67]  Out: 1050 [Urine:1050]    Physical Exam:  Gen: AAOx3, NAD  Pulm: Non-labored breathing  Abd: Soft, mildly distended, appropriately tender, no guarding/rebound   Incision C/D/I   Ext:  Warm and well-perfused    BMP  Recent Labs  Lab 03/29/18  0947 03/29/18  0003 03/28/18  1615 03/28/18  0827 03/27/18  0558 03/22/18  2245     --   --  142 136  --    POTASSIUM 3.8 3.4  --  3.3* 3.5  --    CHLORIDE 110*  --   --  109 98  --    CO2 23  --   --  21 22  --    BUN 26  --   --  35* 38*  --    CR 0.76  --   --  0.82 1.35*  --    *  --   --  141* 205*  --    MAG 2.1  --  2.5* 1.5*  --  2.2   PHOS 2.1*  --   --  2.2*  --   --      CBC  Recent Labs  Lab 03/29/18  0947 03/28/18  0827 03/27/18  0558 03/24/18  0729 03/23/18  0747   WBC 7.9 7.7 17.4*  --   --    HGB 10.4* 9.5* 12.0*  --  10.5*   HCT 33.6* 30.7* 37.9*  --   --     [phone]  --          ASSESSMENT: This is a 83 year old male with PMH carotid artery stenosis, PVD with claudication s/p prior fem-fem cross over graft to leg, HTN, DM2, BPH, found to have ascending colon adenocarcinoma.  3/21/2018 underwent lap right hemicolectomy w/ extensive TALIA, discharged 3/24. Readmitted 3/27 with nausea, vomiting, profuse watery diarrhea, inability to tolerate orals.  Found to have leukocytosis, ISMAEL and c.diff diarrhea.  Xray with dilated small bowel likely ileus.     Neuro/Pain: tylenol prn  CV: restart home  lisinopril today. Will hold HCTZ portion due to diarrhea & e-lyte losses. Resume home aspirin 325mg daily.   PULM: encourage IS.  GI/FEN:   - CLD   - IVF @ 75   - ambulate  : BYRON  Heme: BYRON  ID: c.diff positive.  WBC 17.  Started IV flagyl (due to ileus) plus oral vanc.  WBC now normalized. Continues with diarrhea.   Endocrine: hold metformin.  Start Low SSI QID.     Activity: as tolerated.  Ppx: ppx lovenox daily.  Protonix.   Dispo: when diarrhea slows down, and able to tolerate oral diet.       Demetra Bhatt PA-C   Colon and Rectal Surgery  0943     Patient was seen and discussed with Dr. Ibarra    Attestation:  This patient has been seen and evaluated by me.  Discussed with the house staff team or resident(s) and agree with the findings and plan in this note.  Brandin Ibarra MD  Professor of Surgery  Chief, Division of Colon and Rectal Surgery  [phone]

## 2018-03-29 NOTE — PLAN OF CARE
Problem: Infection, Risk/Actual (Adult)  Goal: Identify Related Risk Factors and Signs and Symptoms  Related risk factors and signs and symptoms are identified upon initiation of Human Response Clinical Practice Guideline (CPG).   Outcome: No Change  Alert and oriented x 4. Complained of some abdominal pain and took dilaudid 0.2 mg x 1. Had slight nausea but did not require antiemetic. Potassium phosphate was given for phosphate level 2.2. Potassium level was 3.4 requiring 10 meq potassium chloride x 4. Second bag infusing and 2 more bags needed. Had diarrhea x 1 and also voided. Daughter is at bedside.

## 2018-03-29 NOTE — PLAN OF CARE
Problem: Patient Care Overview  Goal: Plan of Care/Patient Progress Review  Outcome: Improving  VSS. No stool this evening. Voiding adequate amounts. Patient ambulated halls c SBA. Up to chair x 2. Potassium replacement completed. Potassium lab scheduled 2300. Patient need Potassium Phos given. In med room. MIV @ 75 ml/hr. Patient tried chicken noodle soup but got nauseated. No emesis. Zofran given c relief. Daughter staying c patient this evening. Continue c POC.

## 2018-03-29 NOTE — PLAN OF CARE
Problem: Patient Care Overview  Goal: Plan of Care/Patient Progress Review  Outcome: Therapy, progress toward functional goals is gradual  HTN this AM- restarted on home BP medications. VSS. Declines pain. Diet back to clear liquids- unable to tolerate much PO except for ice chips. Nauseous and had one emesis- zofran given and compazine. Up with SBA ambulating in the hallway with the walker x1. Phos 2.1 and is currently being replaced- recheck in AM. BG checks x3 before meals and HS- SS insulin given per orders. PIV infusing MIVFs and Abx. Daughter supportive at the bedside. Continue with POC.

## 2018-03-30 LAB
ANION GAP SERPL CALCULATED.3IONS-SCNC: 10 MMOL/L (ref 3–14)
BUN SERPL-MCNC: 21 MG/DL (ref 7–30)
CALCIUM SERPL-MCNC: 8.8 MG/DL (ref 8.5–10.1)
CHLORIDE SERPL-SCNC: 110 MMOL/L (ref 94–109)
CO2 SERPL-SCNC: 21 MMOL/L (ref 20–32)
CREAT SERPL-MCNC: 0.76 MG/DL (ref 0.66–1.25)
ERYTHROCYTE [DISTWIDTH] IN BLOOD BY AUTOMATED COUNT: 16.6 % (ref 10–15)
GFR SERPL CREATININE-BSD FRML MDRD: >90 ML/MIN/1.7M2
GLUCOSE BLDC GLUCOMTR-MCNC: 111 MG/DL (ref 70–99)
GLUCOSE BLDC GLUCOMTR-MCNC: 128 MG/DL (ref 70–99)
GLUCOSE BLDC GLUCOMTR-MCNC: 135 MG/DL (ref 70–99)
GLUCOSE BLDC GLUCOMTR-MCNC: 138 MG/DL (ref 70–99)
GLUCOSE BLDC GLUCOMTR-MCNC: 155 MG/DL (ref 70–99)
GLUCOSE SERPL-MCNC: 135 MG/DL (ref 70–99)
HCT VFR BLD AUTO: 32.4 % (ref 40–53)
HGB BLD-MCNC: 10 G/DL (ref 13.3–17.7)
MAGNESIUM SERPL-MCNC: 1.8 MG/DL (ref 1.6–2.3)
MCH RBC QN AUTO: 28 PG (ref 26.5–33)
MCHC RBC AUTO-ENTMCNC: 30.9 G/DL (ref 31.5–36.5)
MCV RBC AUTO: 91 FL (ref 78–100)
PHOSPHATE SERPL-MCNC: 2.1 MG/DL (ref 2.5–4.5)
PLATELET # BLD AUTO: 399 10E9/L (ref 150–450)
POTASSIUM SERPL-SCNC: 3.8 MMOL/L (ref 3.4–5.3)
RBC # BLD AUTO: 3.57 10E12/L (ref 4.4–5.9)
SODIUM SERPL-SCNC: 142 MMOL/L (ref 133–144)
WBC # BLD AUTO: 9.4 10E9/L (ref 4–11)

## 2018-03-30 PROCEDURE — 25000128 H RX IP 250 OP 636: Performed by: STUDENT IN AN ORGANIZED HEALTH CARE EDUCATION/TRAINING PROGRAM

## 2018-03-30 PROCEDURE — 25000128 H RX IP 250 OP 636: Performed by: PHYSICIAN ASSISTANT

## 2018-03-30 PROCEDURE — A9270 NON-COVERED ITEM OR SERVICE: HCPCS | Mod: GY | Performed by: STUDENT IN AN ORGANIZED HEALTH CARE EDUCATION/TRAINING PROGRAM

## 2018-03-30 PROCEDURE — 36415 COLL VENOUS BLD VENIPUNCTURE: CPT | Performed by: STUDENT IN AN ORGANIZED HEALTH CARE EDUCATION/TRAINING PROGRAM

## 2018-03-30 PROCEDURE — A9270 NON-COVERED ITEM OR SERVICE: HCPCS | Mod: GY | Performed by: PHYSICIAN ASSISTANT

## 2018-03-30 PROCEDURE — 25000132 ZZH RX MED GY IP 250 OP 250 PS 637: Mod: GY | Performed by: PHYSICIAN ASSISTANT

## 2018-03-30 PROCEDURE — 40000556 ZZH STATISTIC PERIPHERAL IV START W US GUIDANCE

## 2018-03-30 PROCEDURE — 85027 COMPLETE CBC AUTOMATED: CPT | Performed by: STUDENT IN AN ORGANIZED HEALTH CARE EDUCATION/TRAINING PROGRAM

## 2018-03-30 PROCEDURE — 00000146 ZZHCL STATISTIC GLUCOSE BY METER IP

## 2018-03-30 PROCEDURE — 12000008 ZZH R&B INTERMEDIATE UMMC

## 2018-03-30 PROCEDURE — 80048 BASIC METABOLIC PNL TOTAL CA: CPT | Performed by: STUDENT IN AN ORGANIZED HEALTH CARE EDUCATION/TRAINING PROGRAM

## 2018-03-30 PROCEDURE — 25000132 ZZH RX MED GY IP 250 OP 250 PS 637: Mod: GY | Performed by: STUDENT IN AN ORGANIZED HEALTH CARE EDUCATION/TRAINING PROGRAM

## 2018-03-30 PROCEDURE — 25000125 ZZHC RX 250: Performed by: STUDENT IN AN ORGANIZED HEALTH CARE EDUCATION/TRAINING PROGRAM

## 2018-03-30 PROCEDURE — 83735 ASSAY OF MAGNESIUM: CPT | Performed by: STUDENT IN AN ORGANIZED HEALTH CARE EDUCATION/TRAINING PROGRAM

## 2018-03-30 PROCEDURE — 84100 ASSAY OF PHOSPHORUS: CPT | Performed by: STUDENT IN AN ORGANIZED HEALTH CARE EDUCATION/TRAINING PROGRAM

## 2018-03-30 RX ORDER — TRAMADOL HYDROCHLORIDE 50 MG/1
50 TABLET ORAL EVERY 6 HOURS PRN
Status: DISCONTINUED | OUTPATIENT
Start: 2018-03-30 | End: 2018-04-01 | Stop reason: HOSPADM

## 2018-03-30 RX ORDER — ASPIRIN 325 MG
325 TABLET ORAL DAILY
Qty: 120 TABLET | COMMUNITY
Start: 2018-03-31

## 2018-03-30 RX ORDER — HYDROMORPHONE HCL/0.9% NACL/PF 0.2MG/0.2
0.2 SYRINGE (ML) INTRAVENOUS
Status: DISCONTINUED | OUTPATIENT
Start: 2018-03-30 | End: 2018-04-01 | Stop reason: HOSPADM

## 2018-03-30 RX ORDER — LISINOPRIL 20 MG/1
20 TABLET ORAL DAILY
Qty: 7 TABLET | Refills: 0 | Status: SHIPPED | OUTPATIENT
Start: 2018-03-31 | End: 2018-09-12

## 2018-03-30 RX ADMIN — Medication 125 MG: at 08:27

## 2018-03-30 RX ADMIN — METRONIDAZOLE 500 MG: 500 INJECTION, SOLUTION INTRAVENOUS at 20:23

## 2018-03-30 RX ADMIN — LISINOPRIL 20 MG: 20 TABLET ORAL at 08:27

## 2018-03-30 RX ADMIN — INSULIN ASPART 1 UNITS: 100 INJECTION, SOLUTION INTRAVENOUS; SUBCUTANEOUS at 12:44

## 2018-03-30 RX ADMIN — SODIUM CHLORIDE, POTASSIUM CHLORIDE, SODIUM LACTATE AND CALCIUM CHLORIDE: 600; 310; 30; 20 INJECTION, SOLUTION INTRAVENOUS at 04:38

## 2018-03-30 RX ADMIN — ENOXAPARIN SODIUM 40 MG: 40 INJECTION SUBCUTANEOUS at 09:24

## 2018-03-30 RX ADMIN — METRONIDAZOLE 500 MG: 500 INJECTION, SOLUTION INTRAVENOUS at 02:03

## 2018-03-30 RX ADMIN — ASPIRIN 325 MG ORAL TABLET 325 MG: 325 PILL ORAL at 08:27

## 2018-03-30 RX ADMIN — Medication 125 MG: at 16:28

## 2018-03-30 RX ADMIN — METRONIDAZOLE 500 MG: 500 INJECTION, SOLUTION INTRAVENOUS at 15:09

## 2018-03-30 RX ADMIN — OMEPRAZOLE 20 MG: 20 CAPSULE, DELAYED RELEASE ORAL at 08:27

## 2018-03-30 RX ADMIN — METRONIDAZOLE 500 MG: 500 INJECTION, SOLUTION INTRAVENOUS at 09:24

## 2018-03-30 RX ADMIN — Medication 125 MG: at 12:44

## 2018-03-30 RX ADMIN — Medication 125 MG: at 20:23

## 2018-03-30 RX ADMIN — OMEPRAZOLE 20 MG: 20 CAPSULE, DELAYED RELEASE ORAL at 16:29

## 2018-03-30 RX ADMIN — POTASSIUM PHOSPHATE, MONOBASIC AND POTASSIUM PHOSPHATE, DIBASIC 15 MMOL: 224; 236 INJECTION, SOLUTION INTRAVENOUS at 11:02

## 2018-03-30 NOTE — PLAN OF CARE
Problem: Patient Care Overview  Goal: Plan of Care/Patient Progress Review  Outcome: Therapy, progress towards functional goals is fair  VSS. Denies pain. Reported no nausea. No emesis overnight. Clear diet, eating ice chips. Up SBA. Daughter keeps pt company and assists. Green, watery stools. Pt wears hearing aids, currently not in. Enteric precautions maintained. Voiding spontaneously. Continue current plan of care.        Vascular access called to put in new PIV.

## 2018-03-30 NOTE — PLAN OF CARE
Problem: Nausea/Vomiting (Adult)  Goal: Identify Related Risk Factors and Signs and Symptoms  Related risk factors and signs and symptoms are identified upon initiation of Human Response Clinical Practice Guideline (CPG).   Outcome: Improving  VSS. Pt up with SBA. Denies pain. Tolerating full liquid diet. Insulin given per sliding scale. Phos replaced, AM recheck. MIV infusing through PIV between IV ABX. Pt passing gas. One loose BM this morning. Voids spont with dark colored urine. Demetra SANTOS came to assess, MIV increased to 100 cc/hr. Ambulated in halls. Family at bedside, supportive with cares. Cont. POC.

## 2018-03-30 NOTE — PROGRESS NOTES
"Colorectal Surgery Progress Note    2018  Pranav Jackson  MRN: [id number]    Subjective  Patient did well overnight with no acute events. Three bowel movements overnight, still liquid. Feels \"gassy\". Pain is well controlled. Patient has not been eating much but is tolerating his diet when he does so. No fevers, chills, shortness of breath, chest pain, nausea, or vomiting.    Objective  Temp: 96.8  F (36  C) Temp  Min: 96.8  F (36  C)  Max: 98  F (36.7  C)  Resp: 18 Resp  Min: 16  Max: 18  SpO2: 96 % SpO2  Min: 96 %  Max: 96 %    No Data Recorded  BP: 137/58 Systolic (24hrs), Av , Min:137 , Max:165   Diastolic (24hrs), Av, Min:54, Max:61    Constitutional: Patient resting comfortably in bed, no acute distress.  Pulmonary: No increased work of breathing on room air.  Abdomen: Abdomen is perhaps mildly distended but soft and improved from previous exams. No real tenderness to palpation, no rebound or guarding. Surgical incision is clean, dry, and intact with no surrounding erythema, fluctuance, or purulent drainage.     I/O  In: 290 mL PO in last 24 hours  Urine: 49 mL/hr in last 24 hours  Stool: 3x stool output overnight, unmeasured    Labs  Results for PRANAV JACKSON (MRN [id number]) as of 3/30/2018 08:54   3/30/2018 08:13   Sodium 142   Potassium 3.8   Chloride 110 (H)   Carbon Dioxide 21   Urea Nitrogen 21   Creatinine 0.76   GFR Estimate >90   GFR Estimate If Black >90   Calcium 8.8   Anion Gap 10   Magnesium 1.8   Phosphorus 2.1 (L)   Glucose 135 (H)   WBC 9.4   Hemoglobin 10.0 (L) (stable)   Hematocrit 32.4 (L)   Platelet Count 399   RBC Count 3.57 (L)   MCV 91   MCH 28.0   MCHC 30.9 (L)   RDW 16.6 (H)       Assessment:  Pranav Jackson is an 83 year old male with a history of vascular disease s/p fem-fem bypass, HTN, DM2, and ascending colon adenocarcinoma now POD #9 from laproscopic right hemicolectomy (DOS: 3/21/18). Discharged to home on 3/24/18 but returned on 3/27 with N/V and severe " diarrhea. Found to have C. Diff colitis and on vanc and IV metronidazole. Still having diarrhea and poor appetite.       Plan:  Pain:  - Tylenol prn  CV:  - Holding diuretic given continued diarrhea  - Lisinopril and  mg ok  Pulm:  - Encourage IS  GI/FEN:  - BMP, CBC for continued diarrhea and fluid loss  - Progress to full liquid diet  - IVF LR 75 mL/hr  ID:  - Continue antibiotic regimen    Activity: As tolerated  VTE Prophylaxis: Lovenox  Dispo: Home once diarrhea and PO intake improves.

## 2018-03-30 NOTE — PLAN OF CARE
Problem: Patient Care Overview  Goal: Plan of Care/Patient Progress Review  Outcome: Improving  Patient sleeping between cares this evening. Ambulated halls c assist x1. Up to chair. Patient only tolerating ice chips. No emesis. .Loose stool x 1. Denies pain. Continue c POC.

## 2018-03-30 NOTE — PROGRESS NOTES
Surgery Progress Note  3/30/2018     Subjective:  - ALAINA overnight. Feels much better this AM.  - No c/o pain. Tolerating CLD, denies N/V. BM x3 (improved from x9 yesterday), still liquid.    Objective:  Temp:  [96.8  F (36  C)-98  F (36.7  C)] 96.8  F (36  C)  Pulse:  [76-84] 79  Resp:  [16-18] 18  BP: (137-165)/(54-61) 137/58  SpO2:  [96 %] 96 %  I/O last 3 completed shifts:  In: 2308.75 [P.O.:290; I.V.:2018.75]  Out: 1275 [Urine:1175; Emesis/NG output:100]    Gen: Awake, alert, NAD  Resp: NLB on RA  Abd: Less distended today, soft, minimally tender; incisions healing well  Ext: WWP    BMP  Recent Labs  Lab 03/30/18  0813 03/29/18  0947 03/29/18  0003 03/28/18  1615 03/28/18  0827 03/27/18  0558    141  --   --  142 136   POTASSIUM 3.8 3.8 3.4  --  3.3* 3.5   CHLORIDE 110* 110*  --   --  109 98   MIKALA 8.8 8.4*  --   --  8.4* 9.8   CO2 21 23  --   --  21 22   BUN 21 26  --   --  35* 38*   CR 0.76 0.76  --   --  0.82 1.35*   * 171*  --   --  141* 205*   MAG 1.8 2.1  --  2.5* 1.5*  --    PHOS 2.1* 2.1*  --   --  2.2*  --      CBC  Recent Labs  Lab 03/30/18  0813 03/29/18  0947 03/28/18  0827 03/27/18  0558   WBC 9.4 7.9 7.7 17.4*   RBC 3.57* 3.65* 3.36* 4.14*   HGB 10.0* 10.4* 9.5* 12.0*   HCT 32.4* 33.6* 30.7* 37.9*   MCV 91 92 91 92   MCH 28.0 28.5 28.3 29.0   MCHC 30.9* 31.0* 30.9* 31.7   RDW 16.6* 16.5* 16.5* 16.7*    367 321 439       A/P: Pranav Jackson is a 83 year old male s/p lap R hemicolectomy for colon cancer 3/21, d/c to home 3/24, readmitted 3/27 for profuse diarrhea, found to be C. diff positive.  - OK for FLD  - Encourage ambulation  - Continue PO vanc & IV flagyl  - Continue trending labs  - Dispo: d/c to home likely tomorrow      D/w chief resident +/- staff.    Kendra Junior MD/MPH  Plastic Surgery PGY1

## 2018-03-30 NOTE — PROGRESS NOTES
CRS update    Dark concentrated urine this afternoon.  300cc this shift.  Pt feels like UOP has tapered off.  Feels as though he is fully emptying his bladder.  Does not feel particularly thirsty.  Tolerated cream of wheat this morning without nausea/diarrhea.  He will try to drink more orally if possible.  His diarrhea has slowed down as he has only had one BM today which was earlier this morning.   - increase LR to 100   - recheck labs in the AM  Demetra Bhatt PA-C

## 2018-03-31 LAB
ANION GAP SERPL CALCULATED.3IONS-SCNC: 8 MMOL/L (ref 3–14)
BUN SERPL-MCNC: 18 MG/DL (ref 7–30)
CALCIUM SERPL-MCNC: 8.1 MG/DL (ref 8.5–10.1)
CHLORIDE SERPL-SCNC: 109 MMOL/L (ref 94–109)
CO2 SERPL-SCNC: 21 MMOL/L (ref 20–32)
CREAT SERPL-MCNC: 0.66 MG/DL (ref 0.66–1.25)
ERYTHROCYTE [DISTWIDTH] IN BLOOD BY AUTOMATED COUNT: 16.3 % (ref 10–15)
GFR SERPL CREATININE-BSD FRML MDRD: >90 ML/MIN/1.7M2
GLUCOSE BLDC GLUCOMTR-MCNC: 127 MG/DL (ref 70–99)
GLUCOSE BLDC GLUCOMTR-MCNC: 132 MG/DL (ref 70–99)
GLUCOSE BLDC GLUCOMTR-MCNC: 132 MG/DL (ref 70–99)
GLUCOSE SERPL-MCNC: 127 MG/DL (ref 70–99)
HCT VFR BLD AUTO: 32.2 % (ref 40–53)
HGB BLD-MCNC: 9.9 G/DL (ref 13.3–17.7)
MAGNESIUM SERPL-MCNC: 1.4 MG/DL (ref 1.6–2.3)
MAGNESIUM SERPL-MCNC: 2 MG/DL (ref 1.6–2.3)
MCH RBC QN AUTO: 27.8 PG (ref 26.5–33)
MCHC RBC AUTO-ENTMCNC: 30.7 G/DL (ref 31.5–36.5)
MCV RBC AUTO: 90 FL (ref 78–100)
PHOSPHATE SERPL-MCNC: 1.9 MG/DL (ref 2.5–4.5)
PLATELET # BLD AUTO: 372 10E9/L (ref 150–450)
POTASSIUM SERPL-SCNC: 3.5 MMOL/L (ref 3.4–5.3)
RBC # BLD AUTO: 3.56 10E12/L (ref 4.4–5.9)
SODIUM SERPL-SCNC: 138 MMOL/L (ref 133–144)
WBC # BLD AUTO: 9.8 10E9/L (ref 4–11)

## 2018-03-31 PROCEDURE — 85027 COMPLETE CBC AUTOMATED: CPT | Performed by: STUDENT IN AN ORGANIZED HEALTH CARE EDUCATION/TRAINING PROGRAM

## 2018-03-31 PROCEDURE — 25000128 H RX IP 250 OP 636: Performed by: STUDENT IN AN ORGANIZED HEALTH CARE EDUCATION/TRAINING PROGRAM

## 2018-03-31 PROCEDURE — 83735 ASSAY OF MAGNESIUM: CPT | Performed by: COLON & RECTAL SURGERY

## 2018-03-31 PROCEDURE — 12000008 ZZH R&B INTERMEDIATE UMMC

## 2018-03-31 PROCEDURE — 25000132 ZZH RX MED GY IP 250 OP 250 PS 637: Mod: GY | Performed by: STUDENT IN AN ORGANIZED HEALTH CARE EDUCATION/TRAINING PROGRAM

## 2018-03-31 PROCEDURE — 84100 ASSAY OF PHOSPHORUS: CPT | Performed by: STUDENT IN AN ORGANIZED HEALTH CARE EDUCATION/TRAINING PROGRAM

## 2018-03-31 PROCEDURE — 25000132 ZZH RX MED GY IP 250 OP 250 PS 637: Mod: GY | Performed by: PHYSICIAN ASSISTANT

## 2018-03-31 PROCEDURE — 25000125 ZZHC RX 250: Performed by: STUDENT IN AN ORGANIZED HEALTH CARE EDUCATION/TRAINING PROGRAM

## 2018-03-31 PROCEDURE — 36415 COLL VENOUS BLD VENIPUNCTURE: CPT | Performed by: STUDENT IN AN ORGANIZED HEALTH CARE EDUCATION/TRAINING PROGRAM

## 2018-03-31 PROCEDURE — 00000146 ZZHCL STATISTIC GLUCOSE BY METER IP

## 2018-03-31 PROCEDURE — 40000141 ZZH STATISTIC PERIPHERAL IV START W/O US GUIDANCE

## 2018-03-31 PROCEDURE — A9270 NON-COVERED ITEM OR SERVICE: HCPCS | Mod: GY | Performed by: PHYSICIAN ASSISTANT

## 2018-03-31 PROCEDURE — A9270 NON-COVERED ITEM OR SERVICE: HCPCS | Mod: GY | Performed by: STUDENT IN AN ORGANIZED HEALTH CARE EDUCATION/TRAINING PROGRAM

## 2018-03-31 PROCEDURE — 25000128 H RX IP 250 OP 636: Performed by: PHYSICIAN ASSISTANT

## 2018-03-31 PROCEDURE — 36415 COLL VENOUS BLD VENIPUNCTURE: CPT | Performed by: COLON & RECTAL SURGERY

## 2018-03-31 PROCEDURE — 83735 ASSAY OF MAGNESIUM: CPT | Performed by: STUDENT IN AN ORGANIZED HEALTH CARE EDUCATION/TRAINING PROGRAM

## 2018-03-31 PROCEDURE — 80048 BASIC METABOLIC PNL TOTAL CA: CPT | Performed by: STUDENT IN AN ORGANIZED HEALTH CARE EDUCATION/TRAINING PROGRAM

## 2018-03-31 RX ADMIN — ENOXAPARIN SODIUM 40 MG: 40 INJECTION SUBCUTANEOUS at 09:59

## 2018-03-31 RX ADMIN — Medication 125 MG: at 08:16

## 2018-03-31 RX ADMIN — METRONIDAZOLE 500 MG: 500 INJECTION, SOLUTION INTRAVENOUS at 22:46

## 2018-03-31 RX ADMIN — LISINOPRIL 20 MG: 20 TABLET ORAL at 08:16

## 2018-03-31 RX ADMIN — Medication 125 MG: at 16:41

## 2018-03-31 RX ADMIN — METRONIDAZOLE 500 MG: 500 INJECTION, SOLUTION INTRAVENOUS at 08:17

## 2018-03-31 RX ADMIN — METRONIDAZOLE 500 MG: 500 INJECTION, SOLUTION INTRAVENOUS at 16:41

## 2018-03-31 RX ADMIN — OMEPRAZOLE 20 MG: 20 CAPSULE, DELAYED RELEASE ORAL at 08:16

## 2018-03-31 RX ADMIN — METRONIDAZOLE 500 MG: 500 INJECTION, SOLUTION INTRAVENOUS at 03:47

## 2018-03-31 RX ADMIN — ASPIRIN 325 MG ORAL TABLET 325 MG: 325 PILL ORAL at 08:16

## 2018-03-31 RX ADMIN — SODIUM CHLORIDE, POTASSIUM CHLORIDE, SODIUM LACTATE AND CALCIUM CHLORIDE: 600; 310; 30; 20 INJECTION, SOLUTION INTRAVENOUS at 00:10

## 2018-03-31 RX ADMIN — POTASSIUM PHOSPHATE, MONOBASIC AND POTASSIUM PHOSPHATE, DIBASIC 20 MMOL: 224; 236 INJECTION, SOLUTION INTRAVENOUS at 17:56

## 2018-03-31 RX ADMIN — OMEPRAZOLE 20 MG: 20 CAPSULE, DELAYED RELEASE ORAL at 16:41

## 2018-03-31 RX ADMIN — Medication 125 MG: at 12:18

## 2018-03-31 RX ADMIN — MAGNESIUM SULFATE HEPTAHYDRATE 4 G: 40 INJECTION, SOLUTION INTRAVENOUS at 09:59

## 2018-03-31 RX ADMIN — Medication 125 MG: at 20:34

## 2018-03-31 NOTE — PLAN OF CARE
Problem: Patient Care Overview  Goal: Plan of Care/Patient Progress Review  Outcome: No Change  VSS. Denies pain and nausea. Low fiber diet, no appetite. +gas +BM. Urine remains dark, adequate overnight. Continue to monitor.

## 2018-03-31 NOTE — PROGRESS NOTES
Surgery Progress Note  3/31/2018     Subjective:  - ALAINA overnight. Feels so-so this morning, didn't have an appetite for dinner last night.  - No c/o pain. This morning pt ate breakfast w/o issues. BM frequency continues to decrease.    Objective:  Temp:  [97.7  F (36.5  C)-98.1  F (36.7  C)] 98.1  F (36.7  C)  Pulse:  [74-84] 77  Resp:  [14-16] 14  BP: (150-177)/(62-69) 150/62  SpO2:  [96 %-97 %] 97 %  I/O last 3 completed shifts:  In: 3197.08 [P.O.:480; I.V.:2717.08]  Out: 950 [Urine:950]    Gen: Awake, alert, NAD  Resp: NLB on RA  Abd: Less distended today, soft, minimally tender; incisions healing well  Ext: WWP    BMP  Recent Labs  Lab 03/31/18  0906 03/30/18  0813 03/29/18  0947 03/29/18  0003 03/28/18  1615 03/28/18  0827    142 141  --   --  142   POTASSIUM 3.5 3.8 3.8 3.4  --  3.3*   CHLORIDE 109 110* 110*  --   --  109   MIKALA 8.1* 8.8 8.4*  --   --  8.4*   CO2 21 21 23  --   --  21   BUN 18 21 26  --   --  35*   CR 0.66 0.76 0.76  --   --  0.82   * 135* 171*  --   --  141*   MAG 1.4* 1.8 2.1  --  2.5* 1.5*   PHOS 1.9* 2.1* 2.1*  --   --  2.2*     CBC  Recent Labs  Lab 03/31/18  0906 03/30/18  0813 03/29/18  0947 03/28/18  0827   WBC 9.8 9.4 7.9 7.7   RBC 3.56* 3.57* 3.65* 3.36*   HGB 9.9* 10.0* 10.4* 9.5*   HCT 32.2* 32.4* 33.6* 30.7*   MCV 90 91 92 91   MCH 27.8 28.0 28.5 28.3   MCHC 30.7* 30.9* 31.0* 30.9*   RDW 16.3* 16.6* 16.5* 16.5*    399 367 321       A/P: Pranav Jackson is a 83 year old male s/p lap R hemicolectomy for colon cancer 3/21, d/c to home 3/24, readmitted 3/27 for profuse diarrhea, found to be C. diff positive.  - Low fiber diet  - Encourage ambulation  - Continue PO vanc & IV flagyl  - Continue trending labs  - Dispo: d/c to home likely tomorrow      D/w chief resident +/- staff.    Kendra Junior MD/MPH  Plastic Surgery PGY1

## 2018-04-01 VITALS
HEIGHT: 67 IN | HEART RATE: 80 BPM | RESPIRATION RATE: 14 BRPM | TEMPERATURE: 96.2 F | OXYGEN SATURATION: 96 % | DIASTOLIC BLOOD PRESSURE: 60 MMHG | WEIGHT: 191.3 LBS | BODY MASS INDEX: 30.02 KG/M2 | SYSTOLIC BLOOD PRESSURE: 152 MMHG

## 2018-04-01 LAB
ANION GAP SERPL CALCULATED.3IONS-SCNC: 9 MMOL/L (ref 3–14)
BUN SERPL-MCNC: 13 MG/DL (ref 7–30)
CALCIUM SERPL-MCNC: 8.2 MG/DL (ref 8.5–10.1)
CHLORIDE SERPL-SCNC: 106 MMOL/L (ref 94–109)
CO2 SERPL-SCNC: 21 MMOL/L (ref 20–32)
CREAT SERPL-MCNC: 0.62 MG/DL (ref 0.66–1.25)
ERYTHROCYTE [DISTWIDTH] IN BLOOD BY AUTOMATED COUNT: 16.3 % (ref 10–15)
GFR SERPL CREATININE-BSD FRML MDRD: >90 ML/MIN/1.7M2
GLUCOSE BLDC GLUCOMTR-MCNC: 130 MG/DL (ref 70–99)
GLUCOSE BLDC GLUCOMTR-MCNC: 138 MG/DL (ref 70–99)
GLUCOSE BLDC GLUCOMTR-MCNC: 146 MG/DL (ref 70–99)
GLUCOSE SERPL-MCNC: 159 MG/DL (ref 70–99)
HCT VFR BLD AUTO: 35.4 % (ref 40–53)
HGB BLD-MCNC: 10.8 G/DL (ref 13.3–17.7)
MAGNESIUM SERPL-MCNC: 1.8 MG/DL (ref 1.6–2.3)
MCH RBC QN AUTO: 27.6 PG (ref 26.5–33)
MCHC RBC AUTO-ENTMCNC: 30.5 G/DL (ref 31.5–36.5)
MCV RBC AUTO: 91 FL (ref 78–100)
PHOSPHATE SERPL-MCNC: 2 MG/DL (ref 2.5–4.5)
PLATELET # BLD AUTO: 378 10E9/L (ref 150–450)
POTASSIUM SERPL-SCNC: 3.5 MMOL/L (ref 3.4–5.3)
RBC # BLD AUTO: 3.91 10E12/L (ref 4.4–5.9)
SODIUM SERPL-SCNC: 137 MMOL/L (ref 133–144)
WBC # BLD AUTO: 11.5 10E9/L (ref 4–11)

## 2018-04-01 PROCEDURE — A9270 NON-COVERED ITEM OR SERVICE: HCPCS | Mod: GY | Performed by: STUDENT IN AN ORGANIZED HEALTH CARE EDUCATION/TRAINING PROGRAM

## 2018-04-01 PROCEDURE — 25000125 ZZHC RX 250: Performed by: STUDENT IN AN ORGANIZED HEALTH CARE EDUCATION/TRAINING PROGRAM

## 2018-04-01 PROCEDURE — 25000132 ZZH RX MED GY IP 250 OP 250 PS 637: Mod: GY | Performed by: STUDENT IN AN ORGANIZED HEALTH CARE EDUCATION/TRAINING PROGRAM

## 2018-04-01 PROCEDURE — 25000128 H RX IP 250 OP 636: Performed by: STUDENT IN AN ORGANIZED HEALTH CARE EDUCATION/TRAINING PROGRAM

## 2018-04-01 PROCEDURE — A9270 NON-COVERED ITEM OR SERVICE: HCPCS | Mod: GY | Performed by: PHYSICIAN ASSISTANT

## 2018-04-01 PROCEDURE — 83735 ASSAY OF MAGNESIUM: CPT | Performed by: STUDENT IN AN ORGANIZED HEALTH CARE EDUCATION/TRAINING PROGRAM

## 2018-04-01 PROCEDURE — 36415 COLL VENOUS BLD VENIPUNCTURE: CPT | Performed by: STUDENT IN AN ORGANIZED HEALTH CARE EDUCATION/TRAINING PROGRAM

## 2018-04-01 PROCEDURE — 80048 BASIC METABOLIC PNL TOTAL CA: CPT | Performed by: STUDENT IN AN ORGANIZED HEALTH CARE EDUCATION/TRAINING PROGRAM

## 2018-04-01 PROCEDURE — 84100 ASSAY OF PHOSPHORUS: CPT | Performed by: STUDENT IN AN ORGANIZED HEALTH CARE EDUCATION/TRAINING PROGRAM

## 2018-04-01 PROCEDURE — 00000146 ZZHCL STATISTIC GLUCOSE BY METER IP

## 2018-04-01 PROCEDURE — 85027 COMPLETE CBC AUTOMATED: CPT | Performed by: STUDENT IN AN ORGANIZED HEALTH CARE EDUCATION/TRAINING PROGRAM

## 2018-04-01 PROCEDURE — 25000132 ZZH RX MED GY IP 250 OP 250 PS 637: Mod: GY | Performed by: PHYSICIAN ASSISTANT

## 2018-04-01 RX ADMIN — OMEPRAZOLE 20 MG: 20 CAPSULE, DELAYED RELEASE ORAL at 07:45

## 2018-04-01 RX ADMIN — METRONIDAZOLE 500 MG: 500 INJECTION, SOLUTION INTRAVENOUS at 09:57

## 2018-04-01 RX ADMIN — ASPIRIN 325 MG ORAL TABLET 325 MG: 325 PILL ORAL at 07:45

## 2018-04-01 RX ADMIN — Medication 125 MG: at 07:45

## 2018-04-01 RX ADMIN — ENOXAPARIN SODIUM 40 MG: 40 INJECTION SUBCUTANEOUS at 09:57

## 2018-04-01 RX ADMIN — METRONIDAZOLE 500 MG: 500 INJECTION, SOLUTION INTRAVENOUS at 04:55

## 2018-04-01 RX ADMIN — LISINOPRIL 20 MG: 20 TABLET ORAL at 07:45

## 2018-04-01 NOTE — PLAN OF CARE
Problem: Patient Care Overview  Goal: Plan of Care/Patient Progress Review  Outcome: No Change  VSS. Denies pain/nausea. Mag recheck WNL. Phos recheck this AM. Low fiber diet. BG WNL. Voiding adequate amounts. Up SBA. New PIV placed, IV antibiotics. Continue to monitor.

## 2018-04-01 NOTE — PLAN OF CARE
Problem: Nausea/Vomiting (Adult)  Goal: Identify Related Risk Factors and Signs and Symptoms  Related risk factors and signs and symptoms are identified upon initiation of Human Response Clinical Practice Guideline (CPG).   Outcome: Improving  VSS. Pt up with SBA. Ambulated in halls with walker. Tolerating low fiber diet. Pt had a few loose stools. Voids spont with dark colored urine. PIV saline locked between IV ABX. Phos replacement currently infusing. Denies pain. Denies nausea. No insulin given per sliding scale. Possible D/C tomorrow. Cont. POC.

## 2018-04-01 NOTE — PLAN OF CARE
Problem: Patient Care Overview  Goal: Plan of Care/Patient Progress Review  Outcome: Adequate for Discharge Date Met: 04/01/18  Pt D/C to daughters home. Discharge instructions discussed with Pt and paperwork signed. PIV removed. Medications picked up at D/C pharmacy. All belongings with Pt.

## 2018-04-02 ENCOUNTER — CARE COORDINATION (OUTPATIENT)
Dept: SURGERY | Facility: CLINIC | Age: 83
End: 2018-04-02

## 2018-04-02 NOTE — PROGRESS NOTES
RN Post Op Care Coordination Note     POST-OP CALL      Patient is s/p ED to Hospital admission.     Per Patient's daughter Sherri she reports patient doing much better than the last phone call check in. Patient is up walking around and requesting food to eat.      Fevers/chills: Patient denies fever/chills.  Eating/drinking: Patient is able to eat and drink without any complaints. Drinking 8-10 glasses of fluids per day. Tolerating low fiber diet.   Bowel habits: Patient reports having a couple bowel movements per day, bowel movements are starting to become more formed.  Urine output: Voiding without difficulty, light yellow urine.  Incisions: Patient denies any signs and symptoms of infection. No erythema, swelling or drainage.  Pain: Patient reports pain is controlled with nothing (not using tylenol or ibuprofen or the oxycodone).  Narcotics: None  Follow up appointment is not yet scheduled.     Patient will call with any questions or concerns, all current questions and concerns were addressed to patient's satisfaction, patient aware and in agreement with current plan of care.    SMITH Schwarz Care Coordinator  Colon [facility] Surgery [facility]  [facility]  [phone]

## 2018-04-04 ENCOUNTER — TELEPHONE (OUTPATIENT)
Dept: SURGERY | Facility: CLINIC | Age: 83
End: 2018-04-04

## 2018-04-04 NOTE — TELEPHONE ENCOUNTER
Called patient's daughter to schedule post op.  Patient is scheduled with Dr. Ibarra 4/10/18 at 12:00 pm.

## 2018-04-04 NOTE — TELEPHONE ENCOUNTER
----- Message from Kendra Junior MD sent at 4/2/2018 10:10 AM CDT -----  Regarding: RE: possible discharge home this weekend 3/31 or 4/1  He should be seen by one of us in a week, he's at high risk of getting readmitted.    Thanks,    Kendra    ----- Message -----     From: Eimly Solorio, APRN CNP     Sent: 4/2/2018   9:30 AM       To: Rohit Delgado MD, Sanjana Marcus LPN, #  Subject: RE: possible discharge home this weekend 3/3#    Should this patient see Kimberly while I am gone or wait to see me or RDM in 3-4 weeks or be booked with RDM while I am gone? Thanks!  E  ----- Message -----     From: Demetra Bhatt PA-C     Sent: 3/30/2018  11:22 AM       To: Rohit Delgado MD, #  Subject: possible discharge home this weekend 3/31 or#    Hi Everyone,    Please schedule Pranav Jackson for follow-up in clinic with Emily Solorio NP in 1 week(s) and then RDM in 2-3 weeks.     Date of discharge:  3/31 or 4/1.  S/p lap right ramon was discharged and readmitted with N/V/profuse D. Found to be c.diff + with ileus.     Discharged to:  home     -Antibiotics:  PO vanc  -Pain Medications:  none  -Special Concerns:    1.  Phone call to check in.     Thanks!    Kimberly  Pgr: [phone]

## 2018-04-10 ENCOUNTER — OFFICE VISIT (OUTPATIENT)
Dept: SURGERY | Facility: CLINIC | Age: 83
End: 2018-04-10
Payer: MEDICARE

## 2018-04-10 VITALS
HEIGHT: 67 IN | DIASTOLIC BLOOD PRESSURE: 57 MMHG | WEIGHT: 189.2 LBS | BODY MASS INDEX: 29.7 KG/M2 | HEART RATE: 65 BPM | TEMPERATURE: 97.7 F | OXYGEN SATURATION: 99 % | SYSTOLIC BLOOD PRESSURE: 129 MMHG

## 2018-04-10 DIAGNOSIS — A04.72 C. DIFFICILE COLITIS: ICD-10-CM

## 2018-04-10 DIAGNOSIS — C18.2 MALIGNANT NEOPLASM OF ASCENDING COLON (H): Primary | ICD-10-CM

## 2018-04-10 ASSESSMENT — PAIN SCALES - GENERAL: PAINLEVEL: NO PAIN (0)

## 2018-04-10 NOTE — PROGRESS NOTES
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     Interval history: Pranav Jackson was diagnosed with C. diff on POD#8(2018) and was started on oral vancomycin. He has been doing well post operatively and is able to go about his normal activities. No complains of fever/chills. No pain abdomen. Is tolerating diet well. His diarrhea has clinically resolved.      Physical Examination:  /57 (BP Location: Left arm, Patient Position: Chair, Cuff Size: Adult Regular)  Pulse 65  Temp 97.7  F (36.5  C) (Oral)  Ht 5' 7\"  Wt 189 lb 3.2 oz  SpO2 99%  BMI 29.63 kg/m2  General: Elderly man, comfortable appearing  Abdomen:Soft, non tender, non distended.  Incisions: Vertical incision well approximated with sutures, healed with primary intension, no discharge/ erythema/ swelling. 3 port incisions- healed with primary intention with no discharge/ erythema/ swelling.    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  I reviewed the pathology with Pranav and his family and explained the significant risk of recurrence that can be mitigated with adjuvant chemotherapy, probably single agent 5-fluorouracil.  I explained that this would statistically reduce the risk of recurrence but certainly not eliminate it.  I also explained that any decision regarding pursuing chemotherapy was certainly Pranav's to make.  I advised consultation with the medical oncology group, and we will work on making arrangements per Pranav's request.    I answered all Pranav's and his family's questions to their stated satisfaction.  They expressed her understanding and agreement with the proposed plan.    I saw and examined the patient, led the discussion and edited the medical student note.  I agree with the assessment and plan as outlined.    Brandin Ibarra MD  Professor of Surgery    Total time 20 minutes.  Greater than half the time was spent counseling.    PLEASE SEE NOTE BELOW FOR  REVIEW OF SYSTEMS, AND OTHER HISTORY.          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)      PVD (peripheral vascular disease) with claudication (H)        Surgical history:  Past Surgical History:   Procedure Laterality Date     ARTHROSCOPY KNEE WITH MENISCECTOMY Right 2008     CATARACT IOL, RT/LT  1-2 years ago     Femoral bypass surgeries   03/2005     L4 decompression  2007     LAPAROSCOPIC ASSISTED COLECTOMY Right 3/21/2018    Procedure: LAPAROSCOPIC ASSISTED COLECTOMY;  Laparoscopic Right Hemicolectomy, Extensive Lysis of Adhesions,, Anesthesia Block ([facility] Patient);  Surgeon: Brandin Ibarra MD;  Location: [facility] OR     TONSILLECTOMY       TURP  04/11/2017    with laser       Problem list:  Patient Active Problem List    Diagnosis Date Noted     Vomiting 03/27/2018     Priority: Medium     Colon cancer (H) 03/21/2018     Priority: Medium     Malignant neoplasm of ascending colon (H) 02/09/2018     Priority: Medium       Medications:  Current Outpatient Prescriptions   Medication Sig Dispense Refill     vancomycin (VANOCIN) 50 mg/mL LIQD solution Take 2.5 mLs (125 mg) by mouth 4 times daily for 14 days 140 mL 0     aspirin 325 MG tablet Take 1 tablet (325 mg) by mouth daily 120 tablet      acetaminophen (TYLENOL) 500 MG tablet Take 2 tablets (1,000 mg) by mouth 4 times daily As scheduled for the next 5-7 days, then decrease both dose and frequency to as needed. 50 tablet 0     enoxaparin (LOVENOX) 40 MG/0.4ML injection Inject 0.4 mLs (40 mg) Subcutaneous every 24 hours for 26 days 10.4 mL 0     Multiple Vitamins-Minerals (MULTIVITAMIN ADULT PO) Take by mouth every morning        gabapentin (NEURONTIN) 300 MG capsule Take 300 mg by mouth every morning        omeprazole (PRILOSEC) 20 MG CR capsule Take 20 mg by mouth every morning        metFORMIN (GLUCOPHAGE) 1000 MG tablet Take 1,000 mg by mouth 2 times daily (with meals)        simvastatin (ZOCOR) 20 MG tablet Take 20 mg by mouth At Bedtime        citalopram (CELEXA) 40 MG tablet Take 40 mg by mouth every morning        Ferrous Sulfate (IRON SUPPLEMENT PO) Take 65 mg by mouth 2 times daily (with meals)        lisinopril (PRINIVIL/ZESTRIL) 20 MG tablet Take 1 tablet (20 mg) by mouth daily for 7 days 7 tablet 0     Dulaglutide (TRULICITY SC) Inject 0.75 mg Subcutaneous once a week WED MORNING    "      Allergies:  Allergies   Allergen Reactions     Penicillins Hives     Tolerated PO cephalosporin 7/2016       Family history:  Family History   Problem Relation Age of Onset     Other Cancer Mother      gyn     Other Cancer Father      spine     Thrombosis Son      Factor V Leiden     Thrombosis Daughter        Social history:  Social History     Social History     Marital status:      Spouse name: N/A     Number of children: N/A     Years of education: N/A     Occupational History     Not on file.     Social History Main Topics     Smoking status: Former Smoker     Packs/day: 1.00     Years: 40.00     Types: Cigarettes     Smokeless tobacco: Never Used      Comment: Quit 20 yearsago.     Alcohol use Yes      Comment: 1x/month     Drug use: No     Sexual activity: Not on file     Other Topics Concern     Not on file     Social History Narrative         Nursing Notes:   Sanjana Marcus LPN  4/10/2018 12:10 PM  Signed  Chief Complaint   Patient presents with     Surgical Followup     Post op visit, surgery 3/21/18 with Dr. Ibarra.        Vitals:    04/10/18 1207   BP: 129/57   BP Location: Left arm   Patient Position: Chair   Cuff Size: Adult Regular   Pulse: 65   Temp: 97.7  F (36.5  C)   TempSrc: Oral   SpO2: 99%   Weight: 189 lb 3.2 oz   Height: 5' 7\"       Body mass index is 29.63 kg/(m^2).    Jordi HUITRON LPN                                    "

## 2018-04-10 NOTE — LETTER
"4/10/2018       RE: Pranav Jackson  [address]   [facility] [address]     Dear Colleague,    Thank you for referring your patient, Pranav Jackson, to the [facility] COLON AND RECTAL SURGERY at [facility]. Please see a copy of my visit note below.    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     Interval history: Pranav Jackson was diagnosed with C. diff on POD#8(2018) and was started on oral vancomycin. He has been doing well post operatively and is able to go about his normal activities. No complains of fever/chills. No pain abdomen. Is tolerating diet well. His diarrhea has clinically resolved.      Physical Examination:  /57 (BP Location: Left arm, Patient Position: Chair, Cuff Size: Adult Regular)  Pulse 65  Temp 97.7  F (36.5  C) (Oral)  Ht 5' 7\"  Wt 189 lb 3.2 oz  SpO2 99%  BMI 29.63 kg/m2  General: Elderly man, comfortable appearing  Abdomen:Soft, non tender, non distended.  Incisions: Vertical incision well approximated with sutures, healed with primary intension, no discharge/ erythema/ swelling. 3 port incisions- healed with primary intention with no discharge/ erythema/ swelling.    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  I reviewed the pathology with Pranav and his family and explained the significant risk of recurrence that can be mitigated with adjuvant chemotherapy, probably single agent 5-fluorouracil.  I explained that this would statistically reduce the risk of recurrence but certainly not eliminate it.  I also explained that any decision regarding pursuing chemotherapy was certainly Pranav's to make.  I advised consultation with the medical oncology group, and we will work on making arrangements per Pranav's request.    I answered all Pranav's and his family's questions to their stated satisfaction.  They expressed her understanding and agreement with the proposed plan.    I saw and examined the patient, led the discussion and edited the medical student note.  I agree with the assessment and plan as outlined.    Brandin Ibarra MD  Professor of Surgery    Total time 20 minutes.  Greater than half the time was spent counseling.    PLEASE SEE NOTE BELOW FOR  REVIEW OF SYSTEMS, AND OTHER HISTORY.          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)      PVD (peripheral vascular disease) with claudication (H)        Surgical history:  Past Surgical History:   Procedure Laterality Date     ARTHROSCOPY KNEE WITH MENISCECTOMY Right 2008     CATARACT IOL, RT/LT  1-2 years ago     Femoral bypass surgeries  03/2005     L4 decompression  2007     LAPAROSCOPIC ASSISTED COLECTOMY Right 3/21/2018    Procedure: LAPAROSCOPIC ASSISTED COLECTOMY;  Laparoscopic Right Hemicolectomy, Extensive Lysis of Adhesions,, Anesthesia Block (ERAS Patient);  Surgeon: Brandin Ibarra MD;  Location: [facility] OR     TONSILLECTOMY       TURP  04/11/2017    with laser       Problem list:  Patient Active Problem List    Diagnosis Date Noted     Vomiting 03/27/2018     Priority: Medium     Colon cancer (H) 03/21/2018     Priority: Medium     Malignant neoplasm of ascending colon (H) 02/09/2018     Priority: Medium       Medications:  Current Outpatient Prescriptions   Medication Sig Dispense Refill     vancomycin (VANOCIN) 50 mg/mL LIQD solution Take 2.5 mLs (125 mg) by mouth 4 times daily for 14 days 140 mL 0     aspirin 325 MG tablet Take 1 tablet (325 mg) by mouth daily 120 tablet      acetaminophen (TYLENOL) 500 MG tablet Take 2 tablets (1,000 mg) by mouth 4 times daily As scheduled for the next 5-7 days, then decrease both dose and frequency to as needed. 50 tablet 0     enoxaparin (LOVENOX) 40 MG/0.4ML injection Inject 0.4 mLs (40 mg) Subcutaneous every 24 hours for 26 days 10.4 mL 0     Multiple Vitamins-Minerals (MULTIVITAMIN ADULT PO) Take by mouth every morning        gabapentin (NEURONTIN) 300 MG capsule Take 300 mg by mouth every morning        omeprazole (PRILOSEC) 20 MG CR capsule Take 20 mg by mouth every morning        metFORMIN (GLUCOPHAGE) 1000 MG tablet Take 1,000 mg by mouth 2 times daily (with meals)        simvastatin (ZOCOR) 20 MG tablet Take 20 mg by mouth At Bedtime        citalopram (CELEXA) 40 MG tablet Take 40 mg by mouth every  "morning        Ferrous Sulfate (IRON SUPPLEMENT PO) Take 65 mg by mouth 2 times daily (with meals)        lisinopril (PRINIVIL/ZESTRIL) 20 MG tablet Take 1 tablet (20 mg) by mouth daily for 7 days 7 tablet 0     Dulaglutide (TRULICITY SC) Inject 0.75 mg Subcutaneous once a week WED MORNING         Allergies:  Allergies   Allergen Reactions     Penicillins Hives     Tolerated PO cephalosporin 7/2016       Family history:  Family History   Problem Relation Age of Onset     Other Cancer Mother      gyn     Other Cancer Father      spine     Thrombosis Son      Factor V Leiden     Thrombosis Daughter        Social history:  Social History     Social History     Marital status:      Spouse name: N/A     Number of children: N/A     Years of education: N/A     Occupational History     Not on file.     Social History Main Topics     Smoking status: Former Smoker     Packs/day: 1.00     Years: 40.00     Types: Cigarettes     Smokeless tobacco: Never Used      Comment: Quit 20 yearsago.     Alcohol use Yes      Comment: 1x/month     Drug use: No     Sexual activity: Not on file     Other Topics Concern     Not on file     Social History Narrative         Nursing Notes:   Sanjana Marcus LPN  4/10/2018 12:10 PM  Signed  Chief Complaint   Patient presents with     Surgical Followup     Post op visit, surgery 3/21/18 with Dr. Ibarra.        Vitals:    04/10/18 1207   BP: 129/57   BP Location: Left arm   Patient Position: Chair   Cuff Size: Adult Regular   Pulse: 65   Temp: 97.7  F (36.5  C)   TempSrc: Oral   SpO2: 99%   Weight: 189 lb 3.2 oz   Height: 5' 7\"       Body mass index is 29.63 kg/(m^2).    Jordi HUITRON LPN       Again, thank you for allowing me to participate in the care of your patient.      Sincerely,    Brandin Ibarra MD      "

## 2018-04-10 NOTE — NURSING NOTE
"Chief Complaint   Patient presents with     Surgical Followup     Post op visit, surgery 3/21/18 with Dr. Ibarra.        Vitals:    04/10/18 1207   BP: 129/57   BP Location: Left arm   Patient Position: Chair   Cuff Size: Adult Regular   Pulse: 65   Temp: 97.7  F (36.5  C)   TempSrc: Oral   SpO2: 99%   Weight: 189 lb 3.2 oz   Height: 5' 7\"       Body mass index is 29.63 kg/(m^2).    Jordi HUITRON LPN                  "

## 2018-04-10 NOTE — MR AVS SNAPSHOT
After Visit Summary   4/10/2018    Pranav Jackson    MRN: [id number]           Patient Information     Date Of Birth          11/28/1934        Visit Information        Provider Department      4/10/2018 12:00 PM Brandin Ibarra MD [facility] Colon and Rectal Surgery         Follow-ups after your visit        Your next 10 appointments already scheduled     Apr 16, 2018  1:00 PM CDT   (Arrive by 12:45 PM)   New Patient Visit with Marielle Davis MD   [facility] ([facility])    [address] [place] [address]  [address]  [facility] [id number] [phone]            Jun 07, 2018 12:30 PM CDT   US CAROTID BILATERAL with UCUSV2   [facility] [place] [facility] ([facility])    [address] [place] [address]  1st Floor  [facility] [id number] [phone]           Please bring a list of your medicines (including vitamins, minerals and over-the-counter drugs). Also, tell your doctor about any allergies you may have. Wear comfortable clothes and leave your valuables at home.  You do not need to do anything special to prepare for your exam.  Please call the Imaging Department at your exam site with any questions.            Jun 07, 2018  1:45 PM CDT   (Arrive by 1:30 PM)   Return Vascular Visit with Allyn Walters MD   [facility] ([facility])    [address] [place] [address]  3rd Floor  [facility] [id number] [phone]              Who to contact     Please call your clinic at [phone] to:    Ask questions about your health    Make or cancel appointments    Discuss your medicines    Learn about your test results    Speak to your doctor            Additional Information About Your Visit        [email or web address] Information     [employer] is an electronic gateway that provides easy, online access to your medical records. With [employer], you can request a clinic appointment, read your test results, renew a prescription or communicate with  "your care team.     To sign up for [employer] visit the website at [email or web address]   You will be asked to enter the access code listed below, as well as some personal information. Please follow the directions to create your username and password.     Your access code is: [email or web address]  Expires: 2018  7:30 AM     Your access code will  in 90 days. If you need help or a new code, please contact your [facility] or call [phone] for assistance.        Care EveryWhere ID     This is your Care EveryWhere ID. This could be used by other organizations to access your [place] medical records  [id number]        Your Vitals Were     Pulse Temperature Height Pulse Oximetry BMI (Body Mass Index)       65 97.7  F (36.5  C) (Oral) 5' 7\" 99% 29.63 kg/m2        Blood Pressure from Last 3 Encounters:   04/10/18 129/57   18 152/60   18 159/61    Weight from Last 3 Encounters:   04/10/18 189 lb 3.2 oz   18 191 lb 4.8 oz   18 196 lb 6.4 oz              Today, you had the following     No orders found for display       Primary Care Provider Office Phone # Fax #    Isacc Mcgrath [phone]       [facility] [address]  [facility] [address]        Equal Access to Services     MARQUISE ROBLEDO : Hadii aad ku hadasho Soomaali, waaxda luqadaha, qaybta kaalmada adeegyada, jana schofield . So [facility] [phone].    ATENCIÓN: Si habla español, tiene a stoner disposición servicios gratuitos de asistencia lingüística. Llame al [phone].    We comply with applicable federal civil rights laws and [place] laws. We do not discriminate on the basis of race, color, national origin, age, disability, sex, sexual orientation, or gender identity.            Thank you!     Thank you for choosing [facility] COLON AND RECTAL SURGERY  for your care. Our goal is always to provide you with excellent care. Hearing back from our patients " is one way we can continue to improve our services. Please take a few minutes to complete the written survey that you may receive in the mail after your visit with us. Thank you!             Your Updated Medication List - Protect others around you: Learn how to safely use, store and throw away your medicines at www.disposemymeds.org.          This list is accurate as of 4/10/18 12:34 PM.  Always use your most recent med list.                   Brand Name Dispense Instructions for use Diagnosis    acetaminophen 500 MG tablet    TYLENOL    50 tablet    Take 2 tablets (1,000 mg) by mouth 4 times daily As scheduled for the next 5-7 days, then decrease both dose and frequency to as needed.    S/P right hemicolectomy       aspirin 325 MG tablet     120 tablet    Take 1 tablet (325 mg) by mouth daily        citalopram 40 MG tablet    celeXA     Take 40 mg by mouth every morning        enoxaparin 40 MG/0.4ML injection    LOVENOX    10.4 mL    Inject 0.4 mLs (40 mg) Subcutaneous every 24 hours for 26 days    S/P right hemicolectomy       gabapentin 300 MG capsule    NEURONTIN     Take 300 mg by mouth every morning        IRON SUPPLEMENT PO      Take 65 mg by mouth 2 times daily (with meals)        lisinopril 20 MG tablet    PRINIVIL/ZESTRIL    7 tablet    Take 1 tablet (20 mg) by mouth daily for 7 days    Acute kidney injury (H), Dehydration, Diarrhea, unspecified type, Hypertension, unspecified type       metFORMIN 1000 MG tablet    GLUCOPHAGE     Take 1,000 mg by mouth 2 times daily (with meals)        MULTIVITAMIN ADULT PO      Take by mouth every morning        omeprazole 20 MG CR capsule    priLOSEC     Take 20 mg by mouth every morning        simvastatin 20 MG tablet    ZOCOR     Take 20 mg by mouth At Bedtime        TRULICITY SC      Inject 0.75 mg Subcutaneous once a week WED MORNING        vancomycin 50 mg/mL Liqd solution    VANOCIN    140 mL    Take 2.5 mLs (125 mg) by mouth 4 times daily for 14 days    C.  difficile colitis

## 2018-04-16 ENCOUNTER — ONCOLOGY VISIT (OUTPATIENT)
Dept: ONCOLOGY | Facility: CLINIC | Age: 83
End: 2018-04-16
Attending: INTERNAL MEDICINE
Payer: MEDICARE

## 2018-04-16 VITALS
SYSTOLIC BLOOD PRESSURE: 146 MMHG | BODY MASS INDEX: 29.98 KG/M2 | RESPIRATION RATE: 16 BRPM | TEMPERATURE: 97.8 F | HEIGHT: 67 IN | WEIGHT: 191 LBS | HEART RATE: 70 BPM | DIASTOLIC BLOOD PRESSURE: 67 MMHG | OXYGEN SATURATION: 96 %

## 2018-04-16 DIAGNOSIS — C18.2 MALIGNANT NEOPLASM OF ASCENDING COLON (H): Primary | ICD-10-CM

## 2018-04-16 LAB
ALBUMIN SERPL-MCNC: 3.4 G/DL (ref 3.4–5)
ALP SERPL-CCNC: 78 U/L (ref 40–150)
ALT SERPL W P-5'-P-CCNC: 30 U/L (ref 0–70)
ANION GAP SERPL CALCULATED.3IONS-SCNC: 7 MMOL/L (ref 3–14)
AST SERPL W P-5'-P-CCNC: 24 U/L (ref 0–45)
BASOPHILS # BLD AUTO: 0.1 10E9/L (ref 0–0.2)
BASOPHILS NFR BLD AUTO: 0.8 %
BILIRUB SERPL-MCNC: 0.3 MG/DL (ref 0.2–1.3)
BUN SERPL-MCNC: 22 MG/DL (ref 7–30)
CALCIUM SERPL-MCNC: 9.1 MG/DL (ref 8.5–10.1)
CEA SERPL-MCNC: 0.9 UG/L (ref 0–2.5)
CHLORIDE SERPL-SCNC: 102 MMOL/L (ref 94–109)
CO2 SERPL-SCNC: 24 MMOL/L (ref 20–32)
CREAT SERPL-MCNC: 0.88 MG/DL (ref 0.66–1.25)
DIFFERENTIAL METHOD BLD: ABNORMAL
EOSINOPHIL # BLD AUTO: 0.2 10E9/L (ref 0–0.7)
EOSINOPHIL NFR BLD AUTO: 1.7 %
ERYTHROCYTE [DISTWIDTH] IN BLOOD BY AUTOMATED COUNT: 15.9 % (ref 10–15)
GFR SERPL CREATININE-BSD FRML MDRD: 83 ML/MIN/1.7M2
GLUCOSE SERPL-MCNC: 123 MG/DL (ref 70–99)
HCT VFR BLD AUTO: 38.3 % (ref 40–53)
HGB BLD-MCNC: 11.8 G/DL (ref 13.3–17.7)
IMM GRANULOCYTES # BLD: 0 10E9/L (ref 0–0.4)
IMM GRANULOCYTES NFR BLD: 0.5 %
LYMPHOCYTES # BLD AUTO: 2.4 10E9/L (ref 0.8–5.3)
LYMPHOCYTES NFR BLD AUTO: 27.4 %
MCH RBC QN AUTO: 28 PG (ref 26.5–33)
MCHC RBC AUTO-ENTMCNC: 30.8 G/DL (ref 31.5–36.5)
MCV RBC AUTO: 91 FL (ref 78–100)
MONOCYTES # BLD AUTO: 0.8 10E9/L (ref 0–1.3)
MONOCYTES NFR BLD AUTO: 9.4 %
NEUTROPHILS # BLD AUTO: 5.3 10E9/L (ref 1.6–8.3)
NEUTROPHILS NFR BLD AUTO: 60.2 %
NRBC # BLD AUTO: 0 10*3/UL
NRBC BLD AUTO-RTO: 0 /100
PLATELET # BLD AUTO: 439 10E9/L (ref 150–450)
POTASSIUM SERPL-SCNC: 4.8 MMOL/L (ref 3.4–5.3)
PROT SERPL-MCNC: 7.7 G/DL (ref 6.8–8.8)
RBC # BLD AUTO: 4.21 10E12/L (ref 4.4–5.9)
SODIUM SERPL-SCNC: 134 MMOL/L (ref 133–144)
WBC # BLD AUTO: 8.8 10E9/L (ref 4–11)

## 2018-04-16 PROCEDURE — 82378 CARCINOEMBRYONIC ANTIGEN: CPT | Performed by: INTERNAL MEDICINE

## 2018-04-16 PROCEDURE — 36415 COLL VENOUS BLD VENIPUNCTURE: CPT

## 2018-04-16 PROCEDURE — 99215 OFFICE O/P EST HI 40 MIN: CPT | Mod: ZP | Performed by: INTERNAL MEDICINE

## 2018-04-16 PROCEDURE — 85025 COMPLETE CBC W/AUTO DIFF WBC: CPT | Performed by: INTERNAL MEDICINE

## 2018-04-16 PROCEDURE — G0463 HOSPITAL OUTPT CLINIC VISIT: HCPCS | Mod: ZF

## 2018-04-16 PROCEDURE — 80053 COMPREHEN METABOLIC PANEL: CPT | Performed by: INTERNAL MEDICINE

## 2018-04-16 ASSESSMENT — PAIN SCALES - GENERAL: PAINLEVEL: NO PAIN (0)

## 2018-04-16 NOTE — NURSING NOTE
"Oncology Rooming Note    April 16, 2018 12:47 PM   Pranav Jackson is a 83 year old male who presents for:    Chief Complaint   Patient presents with     Oncology Clinic Visit     New patient visit related to colon Cancer     Initial Vitals: /67 (BP Location: Right arm, Patient Position: Sitting, Cuff Size: Adult Regular)  Pulse 70  Temp 97.8  F (36.6  C) (Tympanic)  Resp 16  Ht 1.702 m (5' 7.01\")  Wt 86.6 kg (191 lb)  SpO2 96%  BMI 29.91 kg/m2 Estimated body mass index is 29.91 kg/(m^2) as calculated from the following:    Height as of this encounter: 1.702 m (5' 7.01\").    Weight as of this encounter: 86.6 kg (191 lb). Body surface area is 2.02 meters squared.  No Pain (0) Comment: Data Unavailable   No LMP for male patient.  Allergies reviewed: Yes  Medications reviewed: Yes    Medications: Medication refills not needed today.  Pharmacy name entered into [employer]: [facility] - [address]    Clinical concerns: No new concerns. Provider was notified.    10 minutes for nursing intake (face to face time)     Rocio Carnes LPN            "

## 2018-04-16 NOTE — LETTER
4/16/2018       RE: Pranav Jackson  [address]   [facility] [address]     Dear Colleague,    Thank you for referring your patient, Pranav Jackson, to the [facility] CANCER CLINIC. Please see a copy of my visit note below.    This is a new consultation for a diagnosis of colon cancer.  The patient has been referred to me by Dr. Ibarra.      HISTORY OF PRESENT ILLNESS:  Pranav is a very pleasant 83-year-old male with past medical history remarkable for carotid artery stenosis, peripheral vascular disease, hypertension, diabetes, BPH and history of femorofemoral crossover graft to leg who was found to have ascending colon adenocarcinoma when he presented with black stools and anemia.  Staging workup showed ascending colonic mass but no evidence of metastatic disease.  He saw Dr. Ibarra and underwent laparoscopic right hemicolectomy with extensive lysis of adhesions on 03/21/2018.  The pathology on the specimen return to be poorly differentiated adenocarcinoma with mucinous and signet ring features with small vessel lymphoid invasion.  He had T3N2b disease with 10/22 lymph nodes that were positive.  He, unfortunately, had a complicated course with C diff infection and was discharged to rehab and is returning today to discuss adjuvant chemotherapy after seeing Dr. Ibarra.      INTERVAL HISTORY:  Pranav states that he is almost back to his normal activity.  He lives alone in a rambler with 3 of his kids that live within a 10-mile radius and come and check on him and help him with ADLs.  He has not had significant issues with bowel regimen, but he is craving to start eating more of his kind of food.  He denies fevers, chills, coughing or breathing trouble.  His C diff infection seemed to be cleared.  He does not have any abdominal pain, nausea or vomiting.  His energy and appetite have improved quite significantly.  Now he is back home.      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)      PVD (peripheral vascular disease) with claudication (H)           PAST SURGICAL HISTORY:    Past Surgical History:   Procedure Laterality Date     ARTHROSCOPY KNEE WITH MENISCECTOMY Right 2008     CATARACT IOL, RT/LT  1-2 years ago     Femoral bypass surgeries  03/2005     L4 decompression  2007     LAPAROSCOPIC ASSISTED COLECTOMY Right 3/21/2018    Procedure: LAPAROSCOPIC ASSISTED COLECTOMY;  Laparoscopic Right Hemicolectomy, Extensive Lysis of Adhesions,, Anesthesia Block (ERAS Patient);  Surgeon: Brandin Ibarra MD;  Location: [facility] OR     TONSILLECTOMY       TURP  04/11/2017    with laser           SOCIAL HISTORY:    Social History     Social History     Marital status:      Spouse name: N/A     Number of children: 5     Years of education: N/A     Occupational History           Social History Main Topics     Smoking status: Former Smoker     Packs/day: 1.00     Years: 40.00     Types: Cigarettes     Smokeless tobacco: Never Used      Comment: Quit 20 yearsago.     Alcohol use Yes      Comment: 1x/month     Drug use: No     Sexual activity: Not on file     Other Topics Concern     Not on file     Social History Narrative           FAMILY HISTORY:    Family History   Problem Relation Age of Onset     Other Cancer Mother      gyn     Other Cancer Father      spine     Thrombosis Son      Factor V Leiden     Thrombosis Daughter            MEDICATIONS:    Current Outpatient Prescriptions   Medication Sig Dispense Refill     acetaminophen (TYLENOL) 500 MG tablet Take 2 tablets (1,000 mg) by mouth 4 times daily As scheduled for the  "next 5-7 days, then decrease both dose and frequency to as needed. 50 tablet 0     aspirin 325 MG tablet Take 1 tablet (325 mg) by mouth daily 120 tablet      citalopram (CELEXA) 40 MG tablet Take 40 mg by mouth every morning        Dulaglutide (TRULICITY SC) Inject 0.75 mg Subcutaneous once a week WED MORNING       Ferrous Sulfate (IRON SUPPLEMENT PO) Take 65 mg by mouth 2 times daily (with meals)        gabapentin (NEURONTIN) 300 MG capsule Take 300 mg by mouth every morning        lisinopril (PRINIVIL/ZESTRIL) 20 MG tablet Take 1 tablet (20 mg) by mouth daily for 7 days 7 tablet 0     metFORMIN (GLUCOPHAGE) 1000 MG tablet Take 1,000 mg by mouth 2 times daily (with meals)        Multiple Vitamins-Minerals (MULTIVITAMIN ADULT PO) Take by mouth every morning        omeprazole (PRILOSEC) 20 MG CR capsule Take 20 mg by mouth every morning        simvastatin (ZOCOR) 20 MG tablet Take 20 mg by mouth At Bedtime              ALLERGIES:       Allergies   Allergen Reactions     Penicillins Hives     Tolerated PO cephalosporin 7/2016           PHYSICAL EXAMINATION:   VITAL SIGNS:  /67 (BP Location: Right arm, Patient Position: Sitting, Cuff Size: Adult Regular)  Pulse 70  Temp 97.8  F (36.6  C) (Tympanic)  Resp 16  Ht 1.702 m (5' 7.01\")  Wt 86.6 kg (191 lb)  SpO2 96%  BMI 29.91 kg/m2    GENERAL:  Alert and oriented x3, in no apparent distress.   HEENT:  Moist mucous membranes.   CARDIOVASCULAR:  S1, S2 clear.   RESPIRATORY:  Clear to auscultation.   ABDOMEN:  Nontender to palpation.   EXTREMITIES:  Lower extremities with no pedal edema.        The patient actually was able to ambulate and get onto the examination table all by himself.  I would rate him at ECOG 1-2.        LABORATORY:  The patient's hemoglobin has continued to improve since the time of surgery, and at the time of my visit, hemoglobin was 11.8, white count was 8.8 and platelet were 439.  Kidney function looks good with a GFR of 83.  Liver panel " looks completely unremarkable.      IMAGING:  CT chest was remarkable for a rib lesion in the right posterior rib, and then he also has 11 mm mediastinal lymph node which needs attention to follow up.      PATHOLOGY:  I reviewed the pathology with the patient, his daughter and his son.  Definitely he has stage III disease, pending further staging workup.  His tumor was 3.4 cm with lymphovascular invasion, poorly differentiated with mucinous and signet ring features.  Pericolonic fatty tissue was involved.  There were 10 out of 22 lymph nodes positive, margins were free of involvement and it had intact expression of MMR.      ASSESSMENT AND PLAN:  Patient with ascending colonic adenocarcinoma with mucinous and signet ring features, at least stage III, pending evaluation with further staging.  Surgery was 03/21 and he has recovered well but still a work in progress at this point.  I had a detailed discussion with the patient and his family.  Given the finding that he has positive lymph nodes, he is at high risk for having metastatic spread of the disease that is not visible to naked eye or CT scan.  I want to further investigate the mediastinal lymph node in the lower rib lesion for which I would like to do a PET CT scan to review it.  In terms of treatment, he would be a candidate for Xeloda, which I reviewed with them the side effect profile inclusive but not limited to diarrhea, rash, mucositis, drop in counts, anemia, fatigue and cardiac issues given his past history.  At this point, they are agreeable to moving forward with adjuvant chemotherapy if it is stage III or palliative chemotherapy if it is stage IV.  I will see him back in a few weeks and give him some more time for recovery.  I will sign off on Xeloda because that would be the plan no matter what stage he is.  It is only the duration of therapy that would be in question.  At that time, we will initiate him on treatment.    I spent 55  minutes in the  care of this patient >50% of which was spent in coordinating and counseling.  Marielle Davis   of Medicine   Hematology and medical Oncology   [facility]

## 2018-04-16 NOTE — NURSING NOTE
Chief Complaint   Patient presents with     Oncology Clinic Visit     New patient visit related to colon Cancer     Blood Draw     Labs drawn via  by SMITH Jenkins RN

## 2018-04-16 NOTE — MR AVS SNAPSHOT
After Visit Summary   4/16/2018    Pranav Jackson    MRN: [id number]           Patient Information     Date Of Birth          11/28/1934        Visit Information        Provider Department      4/16/2018 1:00 PM Marielle Davis MD [facility]        Today's Diagnoses     Malignant neoplasm of ascending colon (H)    -  1       Follow-ups after your visit        Follow-up notes from your care team     Return in about 4 weeks (around 5/14/2018).      Your next 10 appointments already scheduled     Apr 16, 2018  2:00 PM CDT   Masonic Lab Draw with  MASONIC LAB DRAW   [facility] Masonic Lab Draw ([facility])    [address]  [address]  [facility] [id number] [phone]            May 14, 2018  1:30 PM CDT   (Arrive by 1:15 PM)   PET ONCOLOGY WHOLE BODY with UUPET1   [facility] PET CT ([facility])    [address] [id number] [phone]           Tell your doctor:   If there is any chance you may be pregnant or if you are breastfeeding.   If you have problems lying in small spaces (claustrophobia). If you do, your doctor may give you medicine to help you relax. If you have diabetes:   Have your exam early in the morning. Your blood glucose will go up as the day goes by.   Your glucose level must be 180 or less at the start of the exam. Please take any medicines you need to ensure this blood glucose level.   If you are taking insulin in the morning take with breakfast by 6 am and schedule procedure between 12 and 2:15 pm.   If you are taking insulin at night take nightly dose, fast overnight, schedule procedure before 10 am.   If you take insulin both morning and night take morning dose by 6am and schedule procedure between 12 and 2:15 pm.   24 hours before your scan: Don t do any heavy exercise. (No jogging, aerobics or other workouts.) Exercise will make your pictures less  accurate.  At least 7 hours before your scan, or the evening before if you have an early appointment: Eat a low carb, high protein meal (Lean meats, seafood, beans, soy, low-fat dairy, eggs, nuts & seeds). 6 hours before your scan:   Stop all food and liquids (except water).   Do not chew gum or suck on mints.   If you need to take medicine with food, you may take it with a few crackers.  Please call your Imaging Department at your exam site with any questions.            May 16, 2018  8:45 AM CDT   Masonic Lab Draw with  MASONIC LAB DRAW   [facility] Lab Draw ([facility])    [address]  [address]  [facility] [id number] [phone]            May 16, 2018  9:15 AM CDT   (Arrive by 9:00 AM)   Return Visit with Marielle Davis MD   [facility] ([facility])    [address] [place] [address]  [address]  [facility] [id number] [phone]            Jun 07, 2018 12:30 PM CDT   US CAROTID BILATERAL with UCUSV2   [facility] US ([facility])    [address]  1st Floor  [facility] [id number] [phone]           Please bring a list of your medicines (including vitamins, minerals and over-the-counter drugs). Also, tell your doctor about any allergies you may have. Wear comfortable clothes and leave your valuables at home.  You do not need to do anything special to prepare for your exam.  Please call the Imaging Department at your exam site with any questions.            Jun 07, 2018  1:45 PM CDT   (Arrive by 1:30 PM)   Return Vascular Visit with Allyn Walters MD   [facility] ([facility])    [address] [place] [address]  3rd Floor  [facility] [id number] [phone]              Future tests that were ordered for you today     Open Standing Orders        Priority Remaining Interval Expires Ordered    Comprehensive metabolic panel Routine 2/2 every vist   "2019    CBC with platelets differential Routine 2/2 every vist  2019    CEA Routine 2/2 every vist  2019          Open Future Orders        Priority Expected Expires Ordered    PET Oncology Whole Body Routine  2019            Who to contact     If you have questions or need follow up information about today's clinic visit or your schedule please contact [facility] directly at [phone].  Normal or non-critical lab and imaging results will be communicated to you by Tablelist Inchart, letter or phone within 4 business days after the clinic has received the results. If you do not hear from us within 7 days, please contact the clinic through Panvideat or phone. If you have a critical or abnormal lab result, we will notify you by phone as soon as possible.  Submit refill requests through Adonit or call your pharmacy and they will forward the refill request to us. Please allow 3 business days for your refill to be completed.          Additional Information About Your Visit        Tablelist IncharTweet Category Information     Adonit lets you send messages to your doctor, view your test results, renew your prescriptions, schedule appointments and more. To sign up, go to [email or web address] . Click on \"Log in\" on the left side of the screen, which will take you to the Welcome page. Then click on \"Sign up Now\" on the right side of the page.     You will be asked to enter the access code listed below, as well as some personal information. Please follow the directions to create your username and password.     Your access code is: [email or web address]  Expires: 2018  7:30 AM     Your access code will  in 90 days. If you need help or a new code, please call your [place] clinic or [phone].        Care EveryWhere ID     This is your Care EveryWhere ID. This could be used by other organizations to access your [place] medical records  [id number]        Your Vitals Were " "    Pulse Temperature Respirations Height Pulse Oximetry BMI (Body Mass Index)    70 97.8  F (36.6  C) (Tympanic) 16 1.702 m (5' 7.01\") 96% 29.91 kg/m2       Blood Pressure from Last 3 Encounters:   04/16/18 146/67   04/10/18 129/57   04/01/18 152/60    Weight from Last 3 Encounters:   04/16/18 86.6 kg (191 lb)   04/10/18 85.8 kg (189 lb 3.2 oz)   03/30/18 86.8 kg (191 lb 4.8 oz)               Primary Care Provider Office Phone # Fax #    Isacc Mcgrath [phone]       [facility] [address]  [facility] [address]        Equal Access to Services     SAGE ROBLEDO : Oswald flores Sopriya, waaxda luqadaha, qaybta kaalmada adeegyamadyson, jana schofield . So [facility] [phone].    ATENCIÓN: Si habla español, tiene a stoner disposición servicios gratuitos de asistencia lingüística. Kathy al [phone].    We comply with applicable federal civil rights laws and [place] laws. We do not discriminate on the basis of race, color, national origin, age, disability, sex, sexual orientation, or gender identity.            Thank you!     Thank you for choosing [facility]  for your care. Our goal is always to provide you with excellent care. Hearing back from our patients is one way we can continue to improve our services. Please take a few minutes to complete the written survey that you may receive in the mail after your visit with us. Thank you!             Your Updated Medication List - Protect others around you: Learn how to safely use, store and throw away your medicines at www.disposemymeds.org.          This list is accurate as of 4/16/18  1:51 PM.  Always use your most recent med list.                   Brand Name Dispense Instructions for use Diagnosis    acetaminophen 500 MG tablet    TYLENOL    50 tablet    Take 2 tablets (1,000 mg) by mouth 4 times daily As scheduled for the next 5-7 days, then decrease both dose and frequency to as needed.    " S/P right hemicolectomy       aspirin 325 MG tablet     120 tablet    Take 1 tablet (325 mg) by mouth daily        citalopram 40 MG tablet    celeXA     Take 40 mg by mouth every morning        enoxaparin 40 MG/0.4ML injection    LOVENOX    10.4 mL    Inject 0.4 mLs (40 mg) Subcutaneous every 24 hours for 26 days    S/P right hemicolectomy       gabapentin 300 MG capsule    NEURONTIN     Take 300 mg by mouth every morning        IRON SUPPLEMENT PO      Take 65 mg by mouth 2 times daily (with meals)        lisinopril 20 MG tablet    PRINIVIL/ZESTRIL    7 tablet    Take 1 tablet (20 mg) by mouth daily for 7 days    Acute kidney injury (H), Dehydration, Diarrhea, unspecified type, Hypertension, unspecified type       metFORMIN 1000 MG tablet    GLUCOPHAGE     Take 1,000 mg by mouth 2 times daily (with meals)        MULTIVITAMIN ADULT PO      Take by mouth every morning        omeprazole 20 MG CR capsule    priLOSEC     Take 20 mg by mouth every morning        simvastatin 20 MG tablet    ZOCOR     Take 20 mg by mouth At Bedtime        IWONAITY SC      Inject 0.75 mg Subcutaneous once a week WED MORNING

## 2018-04-26 NOTE — PROGRESS NOTES
This is a new consultation for a diagnosis of colon cancer.  The patient has been referred to me by Dr. Ibarra.      HISTORY OF PRESENT ILLNESS:  Pranav is a very pleasant 83-year-old male with past medical history remarkable for carotid artery stenosis, peripheral vascular disease, hypertension, diabetes, BPH and history of femorofemoral crossover graft to leg who was found to have ascending colon adenocarcinoma when he presented with black stools and anemia.  Staging workup showed ascending colonic mass but no evidence of metastatic disease.  He saw Dr. Ibarra and underwent laparoscopic right hemicolectomy with extensive lysis of adhesions on 03/21/2018.  The pathology on the specimen return to be poorly differentiated adenocarcinoma with mucinous and signet ring features with small vessel lymphoid invasion.  He had T3N2b disease with 10/22 lymph nodes that were positive.  He, unfortunately, had a complicated course with C diff infection and was discharged to rehab and is returning today to discuss adjuvant chemotherapy after seeing Dr. Ibarra.      INTERVAL HISTORY:  Pranav states that he is almost back to his normal activity.  He lives alone in a rambler with 3 of his kids that live within a 10-mile radius and come and check on him and help him with ADLs.  He has not had significant issues with bowel regimen, but he is craving to start eating more of his kind of food.  He denies fevers, chills, coughing or breathing trouble.  His C diff infection seemed to be cleared.  He does not have any abdominal pain, nausea or vomiting.  His energy and appetite have improved quite significantly.  Now he is back home.      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)      PVD (peripheral vascular disease) with claudication (H)           PAST SURGICAL HISTORY:    Past Surgical History:   Procedure Laterality Date     ARTHROSCOPY KNEE WITH MENISCECTOMY Right 2008     CATARACT IOL, RT/LT  1-2 years ago     Femoral bypass surgeries  03/2005     L4 decompression  2007     LAPAROSCOPIC ASSISTED COLECTOMY Right 3/21/2018    Procedure: LAPAROSCOPIC ASSISTED COLECTOMY;  Laparoscopic Right Hemicolectomy, Extensive Lysis of Adhesions,, Anesthesia Block ([facility] Patient);  Surgeon: Brandin Ibarra MD;  Location: [facility] OR     TONSILLECTOMY       TURP  04/11/2017    with laser           SOCIAL HISTORY:    Social History     Social History     Marital status:      Spouse name: N/A     Number of children: 5     Years of education: N/A     Occupational History           Social History Main Topics     Smoking status: Former Smoker     Packs/day: 1.00     Years: 40.00     Types: Cigarettes     Smokeless tobacco: Never Used      Comment: Quit 20 yearsago.     Alcohol use Yes      Comment: 1x/month     Drug use: No     Sexual activity: Not on file     Other Topics Concern     Not on file     Social History Narrative           FAMILY HISTORY:    Family History   Problem Relation Age of Onset     Other Cancer Mother      gyn     Other Cancer Father      spine     Thrombosis Son      Factor V Leiden     Thrombosis Daughter            MEDICATIONS:    Current Outpatient Prescriptions   Medication Sig Dispense Refill     acetaminophen (TYLENOL) 500 MG tablet Take 2 tablets (1,000 mg) by mouth 4 times daily As scheduled for the next 5-7 days, then decrease both dose and frequency to as needed. 50 tablet 0     aspirin 325 MG tablet Take 1 tablet (325 mg) by mouth daily 120 tablet      citalopram (CELEXA) 40 MG tablet Take 40 mg by mouth every morning        Dulaglutide  "(TRULICITY SC) Inject 0.75 mg Subcutaneous once a week WED MORNING       Ferrous Sulfate (IRON SUPPLEMENT PO) Take 65 mg by mouth 2 times daily (with meals)        gabapentin (NEURONTIN) 300 MG capsule Take 300 mg by mouth every morning        lisinopril (PRINIVIL/ZESTRIL) 20 MG tablet Take 1 tablet (20 mg) by mouth daily for 7 days 7 tablet 0     metFORMIN (GLUCOPHAGE) 1000 MG tablet Take 1,000 mg by mouth 2 times daily (with meals)        Multiple Vitamins-Minerals (MULTIVITAMIN ADULT PO) Take by mouth every morning        omeprazole (PRILOSEC) 20 MG CR capsule Take 20 mg by mouth every morning        simvastatin (ZOCOR) 20 MG tablet Take 20 mg by mouth At Bedtime              ALLERGIES:       Allergies   Allergen Reactions     Penicillins Hives     Tolerated PO cephalosporin 7/2016           PHYSICAL EXAMINATION:   VITAL SIGNS:  /67 (BP Location: Right arm, Patient Position: Sitting, Cuff Size: Adult Regular)  Pulse 70  Temp 97.8  F (36.6  C) (Tympanic)  Resp 16  Ht 1.702 m (5' 7.01\")  Wt 86.6 kg (191 lb)  SpO2 96%  BMI 29.91 kg/m2    GENERAL:  Alert and oriented x3, in no apparent distress.   HEENT:  Moist mucous membranes.   CARDIOVASCULAR:  S1, S2 clear.   RESPIRATORY:  Clear to auscultation.   ABDOMEN:  Nontender to palpation.   EXTREMITIES:  Lower extremities with no pedal edema.        The patient actually was able to ambulate and get onto the examination table all by himself.  I would rate him at ECOG 1-2.        LABORATORY:  The patient's hemoglobin has continued to improve since the time of surgery, and at the time of my visit, hemoglobin was 11.8, white count was 8.8 and platelet were 439.  Kidney function looks good with a GFR of 83.  Liver panel looks completely unremarkable.      IMAGING:  CT chest was remarkable for a rib lesion in the right posterior rib, and then he also has 11 mm mediastinal lymph node which needs attention to follow up.      PATHOLOGY:  I reviewed the pathology with " the patient, his daughter and his son.  Definitely he has stage III disease, pending further staging workup.  His tumor was 3.4 cm with lymphovascular invasion, poorly differentiated with mucinous and signet ring features.  Pericolonic fatty tissue was involved.  There were 10 out of 22 lymph nodes positive, margins were free of involvement and it had intact expression of MMR.      ASSESSMENT AND PLAN:  Patient with ascending colonic adenocarcinoma with mucinous and signet ring features, at least stage III, pending evaluation with further staging.  Surgery was 03/21 and he has recovered well but still a work in progress at this point.  I had a detailed discussion with the patient and his family.  Given the finding that he has positive lymph nodes, he is at high risk for having metastatic spread of the disease that is not visible to naked eye or CT scan.  I want to further investigate the mediastinal lymph node in the lower rib lesion for which I would like to do a PET CT scan to review it.  In terms of treatment, he would be a candidate for Xeloda, which I reviewed with them the side effect profile inclusive but not limited to diarrhea, rash, mucositis, drop in counts, anemia, fatigue and cardiac issues given his past history.  At this point, they are agreeable to moving forward with adjuvant chemotherapy if it is stage III or palliative chemotherapy if it is stage IV.  I will see him back in a few weeks and give him some more time for recovery.  I will sign off on Xeloda because that would be the plan no matter what stage he is.  It is only the duration of therapy that would be in question.  At that time, we will initiate him on treatment.    I spent 55  minutes in the care of this patient >50% of which was spent in coordinating and counseling.  Marielle Davis   of Medicine   Hematology and medical Oncology   [facility]

## 2018-05-14 ENCOUNTER — HOSPITAL ENCOUNTER (OUTPATIENT)
Dept: PET IMAGING | Facility: CLINIC | Age: 83
Discharge: HOME | End: 2018-05-14
Attending: INTERNAL MEDICINE | Admitting: INTERNAL MEDICINE
Payer: MEDICARE

## 2018-05-14 DIAGNOSIS — C18.2 MALIGNANT NEOPLASM OF ASCENDING COLON (H): ICD-10-CM

## 2018-05-14 LAB — GLUCOSE BLDC GLUCOMTR-MCNC: 91 MG/DL (ref 70–99)

## 2018-05-14 PROCEDURE — 71260 CT THORAX DX C+: CPT

## 2018-05-14 PROCEDURE — 40000141 ZZH STATISTIC PERIPHERAL IV START W/O US GUIDANCE

## 2018-05-14 PROCEDURE — A9552 F18 FDG: HCPCS | Performed by: INTERNAL MEDICINE

## 2018-05-14 PROCEDURE — 82962 GLUCOSE BLOOD TEST: CPT

## 2018-05-14 PROCEDURE — 34300033 ZZH RX 343: Performed by: INTERNAL MEDICINE

## 2018-05-14 PROCEDURE — 25000128 H RX IP 250 OP 636: Performed by: INTERNAL MEDICINE

## 2018-05-14 RX ORDER — IOPAMIDOL 755 MG/ML
60-135 INJECTION, SOLUTION INTRAVASCULAR ONCE
Status: COMPLETED | OUTPATIENT
Start: 2018-05-14 | End: 2018-05-14

## 2018-05-14 RX ADMIN — FLUDEOXYGLUCOSE F-18 12.16 MCI.: 500 INJECTION, SOLUTION INTRAVENOUS at 13:45

## 2018-05-14 RX ADMIN — IOPAMIDOL 118 ML: 755 INJECTION, SOLUTION INTRAVENOUS at 14:36

## 2018-05-16 ENCOUNTER — ONCOLOGY VISIT (OUTPATIENT)
Dept: ONCOLOGY | Facility: CLINIC | Age: 83
End: 2018-05-16
Attending: INTERNAL MEDICINE
Payer: MEDICARE

## 2018-05-16 ENCOUNTER — APPOINTMENT (OUTPATIENT)
Dept: LAB | Facility: CLINIC | Age: 83
End: 2018-05-16
Attending: INTERNAL MEDICINE
Payer: MEDICARE

## 2018-05-16 ENCOUNTER — ONCOLOGY VISIT (OUTPATIENT)
Dept: ONCOLOGY | Facility: CLINIC | Age: 83
End: 2018-05-16
Payer: MEDICARE

## 2018-05-16 VITALS
WEIGHT: 193 LBS | TEMPERATURE: 97.7 F | HEART RATE: 66 BPM | HEIGHT: 67 IN | BODY MASS INDEX: 30.29 KG/M2 | SYSTOLIC BLOOD PRESSURE: 149 MMHG | DIASTOLIC BLOOD PRESSURE: 62 MMHG | OXYGEN SATURATION: 97 %

## 2018-05-16 DIAGNOSIS — Z79.899 ENCOUNTER FOR LONG-TERM CURRENT USE OF MEDICATION: ICD-10-CM

## 2018-05-16 DIAGNOSIS — C18.2 MALIGNANT NEOPLASM OF ASCENDING COLON (H): ICD-10-CM

## 2018-05-16 DIAGNOSIS — K21.9 GASTROESOPHAGEAL REFLUX DISEASE WITHOUT ESOPHAGITIS: Primary | ICD-10-CM

## 2018-05-16 DIAGNOSIS — C18.2 MALIGNANT NEOPLASM OF ASCENDING COLON (H): Primary | ICD-10-CM

## 2018-05-16 LAB
ALBUMIN SERPL-MCNC: 4 G/DL (ref 3.4–5)
ALP SERPL-CCNC: 70 U/L (ref 40–150)
ALT SERPL W P-5'-P-CCNC: 28 U/L (ref 0–70)
ANION GAP SERPL CALCULATED.3IONS-SCNC: 7 MMOL/L (ref 3–14)
AST SERPL W P-5'-P-CCNC: 18 U/L (ref 0–45)
BASOPHILS # BLD AUTO: 0.1 10E9/L (ref 0–0.2)
BASOPHILS NFR BLD AUTO: 0.7 %
BILIRUB SERPL-MCNC: 0.5 MG/DL (ref 0.2–1.3)
BUN SERPL-MCNC: 24 MG/DL (ref 7–30)
CALCIUM SERPL-MCNC: 9.8 MG/DL (ref 8.5–10.1)
CEA SERPL-MCNC: 1.6 UG/L (ref 0–2.5)
CHLORIDE SERPL-SCNC: 100 MMOL/L (ref 94–109)
CO2 SERPL-SCNC: 27 MMOL/L (ref 20–32)
CREAT SERPL-MCNC: 0.86 MG/DL (ref 0.66–1.25)
DIFFERENTIAL METHOD BLD: ABNORMAL
EOSINOPHIL # BLD AUTO: 0.2 10E9/L (ref 0–0.7)
EOSINOPHIL NFR BLD AUTO: 2.4 %
ERYTHROCYTE [DISTWIDTH] IN BLOOD BY AUTOMATED COUNT: 15.1 % (ref 10–15)
GFR SERPL CREATININE-BSD FRML MDRD: 85 ML/MIN/1.7M2
GLUCOSE SERPL-MCNC: 152 MG/DL (ref 70–99)
HCT VFR BLD AUTO: 40.2 % (ref 40–53)
HGB BLD-MCNC: 13 G/DL (ref 13.3–17.7)
IMM GRANULOCYTES # BLD: 0.1 10E9/L (ref 0–0.4)
IMM GRANULOCYTES NFR BLD: 0.5 %
LYMPHOCYTES # BLD AUTO: 2.1 10E9/L (ref 0.8–5.3)
LYMPHOCYTES NFR BLD AUTO: 21.1 %
MCH RBC QN AUTO: 28.7 PG (ref 26.5–33)
MCHC RBC AUTO-ENTMCNC: 32.3 G/DL (ref 31.5–36.5)
MCV RBC AUTO: 89 FL (ref 78–100)
MONOCYTES # BLD AUTO: 0.9 10E9/L (ref 0–1.3)
MONOCYTES NFR BLD AUTO: 8.7 %
NEUTROPHILS # BLD AUTO: 6.5 10E9/L (ref 1.6–8.3)
NEUTROPHILS NFR BLD AUTO: 66.6 %
NRBC # BLD AUTO: 0 10*3/UL
NRBC BLD AUTO-RTO: 0 /100
PLATELET # BLD AUTO: 311 10E9/L (ref 150–450)
POTASSIUM SERPL-SCNC: 4.3 MMOL/L (ref 3.4–5.3)
PROT SERPL-MCNC: 7.7 G/DL (ref 6.8–8.8)
RBC # BLD AUTO: 4.53 10E12/L (ref 4.4–5.9)
SODIUM SERPL-SCNC: 134 MMOL/L (ref 133–144)
WBC # BLD AUTO: 9.7 10E9/L (ref 4–11)

## 2018-05-16 PROCEDURE — 82378 CARCINOEMBRYONIC ANTIGEN: CPT | Performed by: INTERNAL MEDICINE

## 2018-05-16 PROCEDURE — 99214 OFFICE O/P EST MOD 30 MIN: CPT | Mod: ZP | Performed by: INTERNAL MEDICINE

## 2018-05-16 PROCEDURE — 80053 COMPREHEN METABOLIC PANEL: CPT | Performed by: INTERNAL MEDICINE

## 2018-05-16 PROCEDURE — G0463 HOSPITAL OUTPT CLINIC VISIT: HCPCS | Mod: ZF

## 2018-05-16 PROCEDURE — 36415 COLL VENOUS BLD VENIPUNCTURE: CPT

## 2018-05-16 PROCEDURE — 85025 COMPLETE CBC W/AUTO DIFF WBC: CPT | Performed by: INTERNAL MEDICINE

## 2018-05-16 RX ORDER — PROCHLORPERAZINE MALEATE 10 MG
5 TABLET ORAL EVERY 6 HOURS PRN
Qty: 30 TABLET | Refills: 2 | Status: SHIPPED | OUTPATIENT
Start: 2018-05-16

## 2018-05-16 RX ORDER — CAPECITABINE 500 MG/1
1000 TABLET, FILM COATED ORAL 2 TIMES DAILY
Qty: 112 TABLET | Refills: 0 | Status: SHIPPED | OUTPATIENT
Start: 2018-05-16 | End: 2018-05-30

## 2018-05-16 ASSESSMENT — PAIN SCALES - GENERAL: PAINLEVEL: NO PAIN (0)

## 2018-05-16 NOTE — MR AVS SNAPSHOT
After Visit Summary   5/16/2018    Pranav Jackson    MRN: [id number]           Patient Information     Date Of Birth          11/28/1934        Visit Information        Provider Department      5/16/2018 10:39 AM Elizabeth Phelps, [facility]        Today's Diagnoses     Malignant neoplasm of ascending colon (H)    -  1    Encounter for long-term current use of medication           Follow-ups after your visit        Your next 10 appointments already scheduled     May 29, 2018 12:45 PM CDT   [facility] Lab Draw with UC MASONIC LAB DRAW   [facility] Lab Draw ([facility])    [address]  [address]  [place] [id number] [phone]            May 29, 2018  1:20 PM CDT   (Arrive by 1:05 PM)   Return Visit with Mireya Luong PA-C   [facility] ([facility])    [address]  [address]  [place] [id number] [phone]            Jun 07, 2018 12:30 PM CDT   US CAROTID BILATERAL with UCUSV2   [facility] US ([facility])    [address]  1st Floor  [place] [id number] [phone]           Please bring a list of your medicines (including vitamins, minerals and over-the-counter drugs). Also, tell your doctor about any allergies you may have. Wear comfortable clothes and leave your valuables at home.  You do not need to do anything special to prepare for your exam.  Please call the Imaging Department at your exam site with any questions.            Jun 07, 2018  1:45 PM CDT   (Arrive by 1:30 PM)   Return Vascular Visit with Allyn Walters MD   [facility] ([facility])    [address]  3rd Floor  [place] [id number] [phone]              Future tests that were ordered for you today     Open Standing Orders        Priority Remaining Interval Expires Ordered    Comprehensive  "metabolic panel Routine  q21d and prn 2019    CBC with platelets differential Routine  q21d and prn 2019            Who to contact     If you have questions or need follow up information about today's clinic visit or your schedule please contact [facility] directly at [phone].  Normal or non-critical lab and imaging results will be communicated to you by Yi Dehart, letter or phone within 4 business days after the clinic has received the results. If you do not hear from us within 7 days, please contact the clinic through Yi Dehart or phone. If you have a critical or abnormal lab result, we will notify you by phone as soon as possible.  Submit refill requests through Coine[employer] or call your pharmacy and they will forward the refill request to us. Please allow 3 business days for your refill to be completed.          Additional Information About Your Visit        Yi [employer] (formerly [employer]) Information     Coiney lets you send messages to your doctor, view your test results, renew your prescriptions, schedule appointments and more. To sign up, go to [email or web address] . Click on \"Log in\" on the left side of the screen, which will take you to the Welcome page. Then click on \"Sign up Now\" on the right side of the page.     You will be asked to enter the access code listed below, as well as some personal information. Please follow the directions to create your username and password.     Your access code is: [email or web address]  Expires: 2018 10:08 AM     Your access code will  in 90 days. If you need help or a new code, please call your [place] clinic or [phone].        Care EveryWhere ID     This is your Care EveryWhere ID. This could be used by other organizations to access your [place] medical records  [id number]         Blood Pressure from Last 3 Encounters:   18 149/62   18 146/67   04/10/18 129/57    Weight from Last 3 Encounters:   18 87.5 " kg (193 lb)   04/16/18 86.6 kg (191 lb)   04/10/18 85.8 kg (189 lb 3.2 oz)                 Today's Medication Changes          These changes are accurate as of 5/16/18 11:09 AM.  If you have any questions, ask your nurse or doctor.               Start taking these medicines.        Dose/Directions    capecitabine 500 MG tablet CHEMO   Commonly known as:  XELODA   Started by:  Elizabeth Phelps RPH        Dose:  1000 mg/m2   Take 4 tablets (2,000 mg) by mouth 2 times daily for 14 days Days 1 through 14, then off for 7 days. Take within 30 mins after meal.   Quantity:  112 tablet   Refills:  0       prochlorperazine 10 MG tablet   Commonly known as:  COMPAZINE   Started by:  Elizabeth Phelps RPH        Dose:  5 mg   Take 0.5 tablets (5 mg) by mouth every 6 hours as needed (Nausea/Vomiting)   Quantity:  30 tablet   Refills:  2       ranitidine 150 MG tablet   Commonly known as:  ZANTAC   Used for:  Gastroesophageal reflux disease without esophagitis   Started by:  Marielle Davis MD        Dose:  150 mg   Take 1 tablet (150 mg) by mouth 2 times daily   Quantity:  60 tablet   Refills:  11         Stop taking these medicines if you haven't already. Please contact your care team if you have questions.     omeprazole 20 MG CR capsule   Commonly known as:  priLOSEC   Stopped by:  Marielle Davis MD                Where to get your medicines      These medications were sent to [address] [address]    Hours:  TRANSPLANT PHONE NUMBER [phone] Phone:  [phone]     capecitabine 500 MG tablet CHEMO    prochlorperazine 10 MG tablet    ranitidine 150 MG tablet                Primary Care Provider Office Phone # Fax #    Davidkatelyn LIO Mcgrath [phone]       [facility] [address]  [facility] [address]        Equal Access to Services     SAGE ROBLEDO AH: abhijit Ramsey  mike clarkejana shea. So [facility] [phone].    ATENCIÓN: Si alison huynh, tiene a stoner disposición servicios gratuitos de asistencia lingüística. Kathy al [phone].    We comply with applicable federal civil rights laws and [place] laws. We do not discriminate on the basis of race, color, national origin, age, disability, sex, sexual orientation, or gender identity.            Thank you!     Thank you for choosing [facility]  for your care. Our goal is always to provide you with excellent care. Hearing back from our patients is one way we can continue to improve our services. Please take a few minutes to complete the written survey that you may receive in the mail after your visit with us. Thank you!             Your Updated Medication List - Protect others around you: Learn how to safely use, store and throw away your medicines at www.disposemymeds.org.          This list is accurate as of 5/16/18 11:09 AM.  Always use your most recent med list.                   Brand Name Dispense Instructions for use Diagnosis    acetaminophen 500 MG tablet    TYLENOL    50 tablet    Take 2 tablets (1,000 mg) by mouth 4 times daily As scheduled for the next 5-7 days, then decrease both dose and frequency to as needed.    S/P right hemicolectomy       aspirin 325 MG tablet     120 tablet    Take 1 tablet (325 mg) by mouth daily        capecitabine 500 MG tablet CHEMO    XELODA    112 tablet    Take 4 tablets (2,000 mg) by mouth 2 times daily for 14 days Days 1 through 14, then off for 7 days. Take within 30 mins after meal.        citalopram 40 MG tablet    celeXA     Take 40 mg by mouth every morning        gabapentin 300 MG capsule    NEURONTIN     Take 300 mg by mouth every morning        IRON SUPPLEMENT PO      Take 65 mg by mouth 2 times daily (with meals)        lisinopril 20 MG tablet    PRINIVIL/ZESTRIL    7 tablet    Take 1 tablet (20 mg)  by mouth daily for 7 days    Acute kidney injury (H), Dehydration, Diarrhea, unspecified type, Hypertension, unspecified type       metFORMIN 1000 MG tablet    GLUCOPHAGE     Take 1,000 mg by mouth 2 times daily (with meals)        MULTIVITAMIN ADULT PO      Take by mouth every morning        prochlorperazine 10 MG tablet    COMPAZINE    30 tablet    Take 0.5 tablets (5 mg) by mouth every 6 hours as needed (Nausea/Vomiting)        ranitidine 150 MG tablet    ZANTAC    60 tablet    Take 1 tablet (150 mg) by mouth 2 times daily    Gastroesophageal reflux disease without esophagitis       simvastatin 20 MG tablet    ZOCOR     Take 20 mg by mouth At Bedtime        TRULICITY SC      Inject 0.75 mg Subcutaneous once a week WED MORNING

## 2018-05-16 NOTE — LETTER
5/16/2018       RE: Pranav Jackson  [address]   [place] [address]     Dear Colleague,    Thank you for referring your patient, Pranav Jackson, to the [facility]INIC. Please see a copy of my visit note below.    See note in Oral Oncology Management.    Again, thank you for allowing me to participate in the care of your patient.      Sincerely,    Elizabeth Phelps [facility]

## 2018-05-16 NOTE — MR AVS SNAPSHOT
After Visit Summary   5/16/2018    Pranav Jackson    MRN: [id number]           Patient Information     Date Of Birth          11/28/1934        Visit Information        Provider Department      5/16/2018 9:15 AM Marielle Davis MD [facility]        Today's Diagnoses     Malignant neoplasm of ascending colon (H)           Follow-ups after your visit        Follow-up notes from your care team     Return in about 1 week (around 5/23/2018) for Physical Exam, Lab Work.      Your next 10 appointments already scheduled     May 29, 2018 12:45 PM CDT   [facility] Lab Draw with  MASONIC LAB DRAW   [facility] Lab Draw ([facility])    [address]  [address]  [place] [id number] [phone]            May 29, 2018  1:20 PM CDT   (Arrive by 1:05 PM)   Return Visit with Mireya Luong PA-C   [facility] ([facility])    [address]  [address]  [place] [id number] [phone]            Jun 07, 2018 12:30 PM CDT   US CAROTID BILATERAL with UCUSV2   [facility] US ([facility])    [address]  1st Floor  [place] [id number] [phone]           Please bring a list of your medicines (including vitamins, minerals and over-the-counter drugs). Also, tell your doctor about any allergies you may have. Wear comfortable clothes and leave your valuables at home.  You do not need to do anything special to prepare for your exam.  Please call the Imaging Department at your exam site with any questions.            Jun 07, 2018  1:45 PM CDT   (Arrive by 1:30 PM)   Return Vascular Visit with Allyn Walters MD   [facility] Vascular Clinic ([facility])    [address]  3rd Floor  [facility] [id number] [phone]              Who to contact     If you have questions or need follow up information about today's clinic  "visit or your schedule please contact [facility] CANCER [facility] directly at [phone].  Normal or non-critical lab and imaging results will be communicated to you by MyChart, letter or phone within 4 business days after the clinic has received the results. If you do not hear from us within 7 days, please contact the clinic through MyChart or phone. If you have a critical or abnormal lab result, we will notify you by phone as soon as possible.  Submit refill requests through [employer] or call your pharmacy and they will forward the refill request to us. Please allow 3 business days for your refill to be completed.          Additional Information About Your Visit        MyChart Information     [employer] lets you send messages to your doctor, view your test results, renew your prescriptions, schedule appointments and more. To sign up, go to [email or web address] . Click on \"Log in\" on the left side of the screen, which will take you to the Welcome page. Then click on \"Sign up Now\" on the right side of the page.     You will be asked to enter the access code listed below, as well as some personal information. Please follow the directions to create your username and password.     Your access code is: [email or web address]  Expires: 2018 10:08 AM     Your access code will  in 90 days. If you need help or a new code, please call your [place] clinic or [phone].        Care EveryWhere ID     This is your Care EveryWhere ID. This could be used by other organizations to access your [place] medical records  [id number]        Your Vitals Were     Pulse Temperature Height Pulse Oximetry BMI (Body Mass Index)       66 97.7  F (36.5  C) (Oral) 1.702 m (5' 7.01\") 97% 30.22 kg/m2        Blood Pressure from Last 3 Encounters:   18 149/62   18 146/67   04/10/18 129/57    Weight from Last 3 Encounters:   18 87.5 kg (193 lb)   18 86.6 kg (191 lb)   04/10/18 85.8 kg (189 lb 3.2 oz)              We " Performed the Following     CBC with platelets differential     CEA     Comprehensive metabolic panel        Primary Care Provider Office Phone # Fax #    Isacc Mcgrath [phone]       [facility] [address]  [facility] [address]        Equal Access to Services     AVELMARQUISE VENKAT : Hadii aad ku hadelkeo Soomaali, waaxda luqadaha, qaybta kaalmada adeegyada, waxay idiin haydustinn karsonvannessa castellanossony cohne. So [facility] [phone].    ATENCIÓN: Si habla español, tiene a stoner disposición servicios gratuitos de asistencia lingüística. Llame al [phone].    We comply with applicable federal civil rights laws and [place] laws. We do not discriminate on the basis of race, color, national origin, age, disability, sex, sexual orientation, or gender identity.            Thank you!     Thank you for choosing [facility] CANCER CLINIC  for your care. Our goal is always to provide you with excellent care. Hearing back from our patients is one way we can continue to improve our services. Please take a few minutes to complete the written survey that you may receive in the mail after your visit with us. Thank you!             Your Updated Medication List - Protect others around you: Learn how to safely use, store and throw away your medicines at www.disposemymeds.org.          This list is accurate as of 5/16/18 10:08 AM.  Always use your most recent med list.                   Brand Name Dispense Instructions for use Diagnosis    acetaminophen 500 MG tablet    TYLENOL    50 tablet    Take 2 tablets (1,000 mg) by mouth 4 times daily As scheduled for the next 5-7 days, then decrease both dose and frequency to as needed.    S/P right hemicolectomy       aspirin 325 MG tablet     120 tablet    Take 1 tablet (325 mg) by mouth daily        citalopram 40 MG tablet    celeXA     Take 40 mg by mouth every morning        gabapentin 300 MG capsule    NEURONTIN     Take 300 mg by mouth every morning        IRON  SUPPLEMENT PO      Take 65 mg by mouth 2 times daily (with meals)        lisinopril 20 MG tablet    PRINIVIL/ZESTRIL    7 tablet    Take 1 tablet (20 mg) by mouth daily for 7 days    Acute kidney injury (H), Dehydration, Diarrhea, unspecified type, Hypertension, unspecified type       metFORMIN 1000 MG tablet    GLUCOPHAGE     Take 1,000 mg by mouth 2 times daily (with meals)        MULTIVITAMIN ADULT PO      Take by mouth every morning        omeprazole 20 MG CR capsule    priLOSEC     Take 20 mg by mouth every morning        simvastatin 20 MG tablet    ZOCOR     Take 20 mg by mouth At Bedtime        TRULICITY SC      Inject 0.75 mg Subcutaneous once a week WED MORNING

## 2018-05-16 NOTE — LETTER
"5/16/2018       RE: Pranav Jackson  [address]   [facility] [id number]     Dear Colleague,    Thank you for referring your patient, Pranav Jackson, to the [facility]. Please see a copy of my visit note below.    This is a followup visit for a patient with a recent diagnosis of colon cancer.     Kindly refer to my last note for details of the patient's presentation, but basically he is a patient with a complicated cardiac history who was diagnosed with ascending colon adenocarcinoma when he presented with black stools and anemia.  Further workup showed a mass, and he underwent a laparoscopic right hemicolectomy with extensive lysis of adhesions on 03/20/2018.  Pathology was remarkable for poorly differentiated adenocarcinoma with mucinous and signet features with small-vessel lymphoid invasion.  He was rated at T3N2b with the disease at 10/22 lymph nodes that were positive.  Because he had a lytic rib lesion, I had set him up to get a PET-CT scan, and he returns today to review the imaging and further treatment plan.      INTERVAL HISTORY:  Pranav states that he is continuing to do physical therapy, and at this point he has mostly resumed his [id number] physical activity.  He is walking.  He is starting to eat more his kind of food.  He denies any fevers, chills, coughing or breathing trouble.  No more diarrhea, no abdominal pain, no nausea, no vomiting.  Energy and appetite have significantly improved.  His family has continued to remain really involved in his care.  He feels he is blessed to be able to do all the ADLs by himself.       MEDICAL HISTORY, SURGICAL HISTORY, SOCIAL HISTORY, FAMILY HISTORY:  Unchanged.      PHYSICAL EXAMINATION:   /62 (BP Location: Left arm, Patient Position: Sitting, Cuff Size: Adult Regular)  Pulse 66  Temp 97.7  F (36.5  C) (Oral)  Ht 1.702 m (5' 7.01\")  Wt 87.5 kg (193 lb)  SpO2 97%  BMI 30.22 kg/m2    GENERAL:  Alert and oriented x3, in " no apparent distress.   HEENT:  Moist mucous membranes.    ABDOMEN:  Nontender to palpation.  No organomegaly appreciated.   EXTREMITIES:  Lower extremities with no pedal edema.   PSYCHIATRIC:  Appropriate affect.     NEUROLOGIC:  Cranial nerves II-XII intact.  Moving all extremities appropriately.       Labs    Results for SOCORRO STEINBERG (MRN [id number]) as of 5/17/2018 13:33   Ref. Range 5/16/2018 09:03   Sodium Latest Ref Range: 133 - 144 mmol/L 134   Potassium Latest Ref Range: 3.4 - 5.3 mmol/L 4.3   Chloride Latest Ref Range: 94 - 109 mmol/L 100   Carbon Dioxide Latest Ref Range: 20 - 32 mmol/L 27   Urea Nitrogen Latest Ref Range: 7 - 30 mg/dL 24   Creatinine Latest Ref Range: 0.66 - 1.25 mg/dL 0.86   GFR Estimate Latest Ref Range: >60 mL/min/1.7m2 85   GFR Estimate If Black Latest Ref Range: >60 mL/min/1.7m2 >90   Calcium Latest Ref Range: 8.5 - 10.1 mg/dL 9.8   Anion Gap Latest Ref Range: 3 - 14 mmol/L 7   Albumin Latest Ref Range: 3.4 - 5.0 g/dL 4.0   Protein Total Latest Ref Range: 6.8 - 8.8 g/dL 7.7   Bilirubin Total Latest Ref Range: 0.2 - 1.3 mg/dL 0.5   Alkaline Phosphatase Latest Ref Range: 40 - 150 U/L 70   ALT Latest Ref Range: 0 - 70 U/L 28   AST Latest Ref Range: 0 - 45 U/L 18   Glucose Latest Ref Range: 70 - 99 mg/dL 152 (H)   WBC Latest Ref Range: 4.0 - 11.0 10e9/L 9.7   Hemoglobin Latest Ref Range: 13.3 - 17.7 g/dL 13.0 (L)   Hematocrit Latest Ref Range: 40.0 - 53.0 % 40.2   Platelet Count Latest Ref Range: 150 - 450 10e9/L 311   RBC Count Latest Ref Range: 4.4 - 5.9 10e12/L 4.53   MCV Latest Ref Range: 78 - 100 fl 89   MCH Latest Ref Range: 26.5 - 33.0 pg 28.7   MCHC Latest Ref Range: 31.5 - 36.5 g/dL 32.3   RDW Latest Ref Range: 10.0 - 15.0 % 15.1 (H)   Diff Method Unknown Automated Method   % Neutrophils Latest Units: % 66.6   % Lymphocytes Latest Units: % 21.1   % Monocytes Latest Units: % 8.7   % Eosinophils Latest Units: % 2.4   % Basophils Latest Units: % 0.7   % Immature Granulocytes  Latest Units: % 0.5   Nucleated RBCs Latest Ref Range: 0 /100 0   Absolute Neutrophil Latest Ref Range: 1.6 - 8.3 10e9/L 6.5   Absolute Lymphocytes Latest Ref Range: 0.8 - 5.3 10e9/L 2.1   Absolute Monocytes Latest Ref Range: 0.0 - 1.3 10e9/L 0.9   Absolute Eosinophils Latest Ref Range: 0.0 - 0.7 10e9/L 0.2   Absolute Basophils Latest Ref Range: 0.0 - 0.2 10e9/L 0.1   Abs Immature Granulocytes Latest Ref Range: 0 - 0.4 10e9/L 0.1   Absolute Nucleated RBC Unknown 0.0   CEA Latest Ref Range: 0 - 2.5 ug/L 1.6       Imaging   1. PET of the neck, chest, abdomen, and pelvis.  2. PET CT Fusion for Attenuation Correction and Anatomical  Localization:    3. Diagnostic CT scan of the chest, abdomen, and pelvis with  intravenous contrast for interpretation.  3. CT of the chest, abdomen and pelvis obtained for diagnostic  interpretation.  4. 3D MIP and PET-CT fused images were processed on an independent  workstation and archived to PACS and reviewed by a radiologist.     Technique:     1. PET: The patient received 12.16 mCi of F-18-FDG; the serum glucose  was 91 prior to administration, body weight was 86.6 kg. Images were  evaluated in the axial, sagittal, and coronal planes as well as the  rotational whole body MIP. Images were acquired from the Vertex to the  Feet.     UPTAKE WAS MEASURED AT 63 MINUTES.      BACKGROUND:  Liver SUV max= 4.24,   Aorta Blood SUV Max: 3.37.      2. CT: Volumetric acquisition for clinical interpretation of the  chest, abdomen, and pelvis acquired at 3 mm sections . The chest,  abdomen, and pelvis were evaluated at 5 mm sections in bone, soft  tissue, and lung windows.       The patient received 118 cc. Of Isovue 370 intravenously for the  examination.       3. 3D MIP and PET-CT fused images were processed on an independent  workstation and archived to PACS and reviewed by a radiologist.     INDICATION: COlon cancer; Malignant neoplasm of ascending colon (H)     ADDITIONAL INFORMATION OBTAINED  FROM EMR: Patient with history of  colon cancer, status post right hemicolectomy     COMPARISON: CT chest 3/7/2018, outside CT abdomen 1/29/2018.     FINDINGS:      HEAD/NECK:  There is no  suspicious FDG uptake in the neck.      The paranasal sinuses are clear. The mastoid air cells clear.      The mucosal pharyngeal space, the , prevertebral and carotid  spaces are within normal limits.      No masses, mass effect or pathologically enlarged lymph nodes are  evident. Unchanged non-FDG avid hypodense thyroid nodules.     Encephalomalacia in the right cerebellar hemisphere.     CHEST:  Mildly FDG avid nonenlarged bilateral hilar and mediastinal lymph  nodes. Examples include 1.1 cm left paratracheal node with max SUV of  3.8 and 2.1 cm right hilar node with max SUV of 4.4.      Subcentimeter pulmonary nodules, below the resolution of PET. Examples  include 4 mm right upper lobe nodule (series 9, image 75) and 5 mm  nodule in the right upper lobe (series 9, image 68).     There is no significant pericardial or plural effusions.     Coronary artery calcifications.     ABDOMEN AND PELVIS:  There is no suspicious FDG uptake in the abdomen or pelvis.     Post surgical changes of right hemicolectomy. There is nonspecific FDG  uptake in the colon.     There are no suspicious hepatic lesions. There is no splenomegaly or  evidence for splenic or pancreatic mass lesion.  There are no suspicious adrenal mass lesions or opaque gallbladder  calculi. There is symmetric nephrographic renal phase without  hydronephrosis. Bilateral renal cortical cysts.     There is no evidence for diverticulitis, bowel obstruction or free  fluid.       Prostatomegaly.     LOWER EXTREMITIES:   No abnormal masses or hypermetabolic lesions. Subchondral cyst in the  left hip joint.     BONES:   There are no suspicious lytic or blastic osseous lesions.  There is no  abnormal FDG uptake in the skeleton. Postsurgical changes of posterior  spinal  fusion at L4-5.         IMPRESSION: In this patient with history of colon cancer, status post  right hemicolectomy; no evidence of metastatic disease.  1. Nonspecific diffuse FDG uptake throughout the residual colon  without associated CT abnormality. This is likely related to  metformin.   2. Mildly FDG avid nonenlarged mediastinal and hilar lymph nodes,  favoring reactive.  3. Prostatomegaly.  4. Subcentimeter pulmonary nodules, below the resolution of PET.  Attention on follow-up.     ASSESSMENT AND PLAN:     1.  Patient with what I would call stage III colon cancer, [id number] disease.  (He has some nonspecific uptake on the PET-CT scan, but in absence of definitive corresponding CT abnormalities, I do not think that he has stage IV disease, and the lytic rib lesion has not let up.)  I think we would start treatment with a curative intent.  We had discussed Xeloda, and the patient and family were agreeable to it.  We reviewed the side effects again, including, but not limited to, fatigue, infectious complications, bleeding complications, mucositis, diarrhea, skin rash, concern for cardiac events, renal and liver dysfunction.  They want to proceed with therapy.  We reviewed it is 2 weeks on and 1 week off b.i.d. dosing.  He would return in the next 7 days for a toxicity check.  I will have our pharmacist sit down with him to review all his medications with medication interaction.     2.  Concern for QTc prolongation with Celexa.  The patient had an EKG in March.  He will return in 1 week, and at that time we would reevaluate his QTc to see if there is any impact with medication interaction.   3.  Decreased absorption with omeprazole.  I am switching him from omeprazole to ranitidine for management of his GERD, as omeprazole decreases the absorption of Xeloda.     I spent 35 minutes in the care of this patient >50% of which was spent in coordinating and counseling.      Marielle Davis   of Medicine    Hematology and medical Oncology   [facility]

## 2018-05-16 NOTE — ORAL ONC MGMT
Oral Chemotherapy Monitoring Program    Primary Oncologist: Dr. Davis   Primary Oncology Clinic: [facility]  Cancer Diagnosis: Colon Cancer     Drug: Xeloda 2000 mg po BID for 14 days, off 7 days  Start Date: 5/17/18  Dose is appropriate for patients:  BSA and Renal Function   Expected duration of therapy: Until disease progression or unacceptable toxicity    Drug Interaction Assessment: Xeloda -RaNITIdine -CeleXA -Ferrous Sulfate -Neurontin -Lisinopril -MetFORMIN -Zocor -Compazine -Omeprazole  Significant Drug interaction with Xeloda and Celexa = QTc prolongation potential, last EKG in March 2018 and was 475.  Will get a repeat on 5/29 visit.  Patient will have been on for 12 days then.  Drug interaction with Xeloda and Omeprazole = Omeprazole decreases Xeloda absorption. Dr. Davis stopped the Omeprazole and switched to Ranitidine.  Educated patient.    Lab Monitoring Plan  Monitoring plan for the Q3 week cycle:   C1D1+   CMP, CBC C2D1+ Call, CMP, CBC C3D1+ Call, CMP, CBC C4D1+ Call, CMP, CBC C5D1+ Call, CMP, CBC C6D1+ Call, CMP, CBC   C1D8+ Call C2D8+  C3D8+  C4D8+  C5D8+  C6D8+    C1D15+  C2D15+  C3D15+  C4D15+  C5D15+  C6D15+          Labs drawn outside of [place]: May be drawn at [facility], fax number to send lab requests is [phone]. I faxed CBC and CMP lab requests.    Subjective/Objective:  Pranav Jackson is a 83 year old male seen in clinic for an initial visit for oral chemotherapy education.      ORAL CHEMOTHERAPY 5/16/2018   Drug Name Xeloda (Capecitabine)   Current Dosage 2000mg   Current Schedule BID   Cycle Details 2 weeks on 1 week off   Start Date of Last Cycle 5/17/2018   Planned next cycle start date 6/7/2018   Any new drug interactions? Yes   Pharmacist Intervention? Yes   Intervention(s) Drug changed (non-chemo)   Is the dose as ordered appropriate for the patient? Yes         Vitals:  BP:   BP Readings from Last 1 Encounters:   05/16/18 149/62     Wt Readings from Last  "1 Encounters:   05/16/18 87.5 kg (193 lb)     Estimated body surface area is 2.03 meters squared as calculated from the following:    Height as of an earlier encounter on 5/16/18: 1.702 m (5' 7.01\").    Weight as of an earlier encounter on 5/16/18: 87.5 kg (193 lb).      Labs:  _  Result Component Current Result Ref Range   Sodium 134 (5/16/2018) 133 - 144 mmol/L     _  Result Component Current Result Ref Range   Potassium 4.3 (5/16/2018) 3.4 - 5.3 mmol/L     _  Result Component Current Result Ref Range   Calcium 9.8 (5/16/2018) 8.5 - 10.1 mg/dL     No results found for Mag within last 30 days.     No results found for Phos within last 30 days.     _  Result Component Current Result Ref Range   Albumin 4.0 (5/16/2018) 3.4 - 5.0 g/dL     _  Result Component Current Result Ref Range   Urea Nitrogen 24 (5/16/2018) 7 - 30 mg/dL     _  Result Component Current Result Ref Range   Creatinine 0.86 (5/16/2018) 0.66 - 1.25 mg/dL       _  Result Component Current Result Ref Range   AST 18 (5/16/2018) 0 - 45 U/L     _  Result Component Current Result Ref Range   ALT 28 (5/16/2018) 0 - 70 U/L     _  Result Component Current Result Ref Range   Bilirubin Total 0.5 (5/16/2018) 0.2 - 1.3 mg/dL       _  Result Component Current Result Ref Range   WBC 9.7 (5/16/2018) 4.0 - 11.0 10e9/L     _  Result Component Current Result Ref Range   Hemoglobin 13.0 (L) (5/16/2018) 13.3 - 17.7 g/dL     _  Result Component Current Result Ref Range   Platelet Count 311 (5/16/2018) 150 - 450 10e9/L     _  Result Component Current Result Ref Range   Absolute Neutrophil 6.5 (5/16/2018) 1.6 - 8.3 10e9/L           Assessment:  Patient is appropriate to start therapy.    Plan:  Basic chemotherapy teaching was reviewed with the patient and the patient's family including indication, start date of therapy, dose, administration, adverse effects, missed doses, food and drug interactions, monitoring, side effect management, office contact information, and safe " handling. Written materials were provided and all questions answered. Educated patient about stopping Omeprazole therapy and starting Ranitidine. Also educated patient we will be monitoring his EKG at the apt on 5/29 to access for prolongation with Xeloda and Celexa.     Follow-Up:  Will follow up with patient 1 week after starting therapy, so 5/24.  Will also review midlevel/Dr. Davis's appointments.       Elizabeth Phelps, Pharm.D., [facility]  [phone]  05/16/18

## 2018-05-16 NOTE — NURSING NOTE
Chief Complaint   Patient presents with     Blood Draw     vitals done by MERRILL, venipuncture done by vascular access SMITH Cook CMA on 5/16/2018 at 9:08 AM

## 2018-05-16 NOTE — PROGRESS NOTES
"This is a followup visit for a patient with a recent diagnosis of colon cancer.     Kindly refer to my last note for details of the patient's presentation, but basically he is a patient with a complicated cardiac history who was diagnosed with ascending colon adenocarcinoma when he presented with black stools and anemia.  Further workup showed a mass, and he underwent a laparoscopic right hemicolectomy with extensive lysis of adhesions on 03/20/2018.  Pathology was remarkable for poorly differentiated adenocarcinoma with mucinous and signet features with small-vessel lymphoid invasion.  He was rated at T3N2b with the disease at 10/22 lymph nodes that were positive.  Because he had a lytic rib lesion, I had set him up to get a PET-CT scan, and he returns today to review the imaging and further treatment plan.      INTERVAL HISTORY:  Pranav states that he is continuing to do physical therapy, and at this point he has mostly resumed his [id number]-[id number] physical activity.  He is walking.  He is starting to eat more his kind of food.  He denies any fevers, chills, coughing or breathing trouble.  No more diarrhea, no abdominal pain, no nausea, no vomiting.  Energy and appetite have significantly improved.  His family has continued to remain really involved in his care.  He feels he is blessed to be able to do all the ADLs by himself.       MEDICAL HISTORY, SURGICAL HISTORY, SOCIAL HISTORY, FAMILY HISTORY:  Unchanged.      PHYSICAL EXAMINATION:   /62 (BP Location: Left arm, Patient Position: Sitting, Cuff Size: Adult Regular)  Pulse 66  Temp 97.7  F (36.5  C) (Oral)  Ht 1.702 m (5' 7.01\")  Wt 87.5 kg (193 lb)  SpO2 97%  BMI 30.22 kg/m2    GENERAL:  Alert and oriented x3, in no apparent distress.   HEENT:  Moist mucous membranes.    ABDOMEN:  Nontender to palpation.  No organomegaly appreciated.   EXTREMITIES:  Lower extremities with no pedal edema.   PSYCHIATRIC:  Appropriate affect.     NEUROLOGIC:  Cranial " nerves II-XII intact.  Moving all extremities appropriately.       Labs    Results for SOCORRO STEINBERG (MRN [id number]) as of 5/17/2018 13:33   Ref. Range 5/16/2018 09:03   Sodium Latest Ref Range: 133 - 144 mmol/L 134   Potassium Latest Ref Range: 3.4 - 5.3 mmol/L 4.3   Chloride Latest Ref Range: 94 - 109 mmol/L 100   Carbon Dioxide Latest Ref Range: 20 - 32 mmol/L 27   Urea Nitrogen Latest Ref Range: 7 - 30 mg/dL 24   Creatinine Latest Ref Range: 0.66 - 1.25 mg/dL 0.86   GFR Estimate Latest Ref Range: >60 mL/min/1.7m2 85   GFR Estimate If Black Latest Ref Range: >60 mL/min/1.7m2 >90   Calcium Latest Ref Range: 8.5 - 10.1 mg/dL 9.8   Anion Gap Latest Ref Range: 3 - 14 mmol/L 7   Albumin Latest Ref Range: 3.4 - 5.0 g/dL 4.0   Protein Total Latest Ref Range: 6.8 - 8.8 g/dL 7.7   Bilirubin Total Latest Ref Range: 0.2 - 1.3 mg/dL 0.5   Alkaline Phosphatase Latest Ref Range: 40 - 150 U/L 70   ALT Latest Ref Range: 0 - 70 U/L 28   AST Latest Ref Range: 0 - 45 U/L 18   Glucose Latest Ref Range: 70 - 99 mg/dL 152 (H)   WBC Latest Ref Range: 4.0 - 11.0 10e9/L 9.7   Hemoglobin Latest Ref Range: 13.3 - 17.7 g/dL 13.0 (L)   Hematocrit Latest Ref Range: 40.0 - 53.0 % 40.2   Platelet Count Latest Ref Range: 150 - 450 10e9/L 311   RBC Count Latest Ref Range: 4.4 - 5.9 10e12/L 4.53   MCV Latest Ref Range: 78 - 100 fl 89   MCH Latest Ref Range: 26.5 - 33.0 pg 28.7   MCHC Latest Ref Range: 31.5 - 36.5 g/dL 32.3   RDW Latest Ref Range: 10.0 - 15.0 % 15.1 (H)   Diff Method Unknown Automated Method   % Neutrophils Latest Units: % 66.6   % Lymphocytes Latest Units: % 21.1   % Monocytes Latest Units: % 8.7   % Eosinophils Latest Units: % 2.4   % Basophils Latest Units: % 0.7   % Immature Granulocytes Latest Units: % 0.5   Nucleated RBCs Latest Ref Range: 0 /100 0   Absolute Neutrophil Latest Ref Range: 1.6 - 8.3 10e9/L 6.5   Absolute Lymphocytes Latest Ref Range: 0.8 - 5.3 10e9/L 2.1   Absolute Monocytes Latest Ref Range: 0.0 - 1.3 10e9/L  0.9   Absolute Eosinophils Latest Ref Range: 0.0 - 0.7 10e9/L 0.2   Absolute Basophils Latest Ref Range: 0.0 - 0.2 10e9/L 0.1   Abs Immature Granulocytes Latest Ref Range: 0 - 0.4 10e9/L 0.1   Absolute Nucleated RBC Unknown 0.0   CEA Latest Ref Range: 0 - 2.5 ug/L 1.6       Imaging   1. PET of the neck, chest, abdomen, and pelvis.  2. PET CT Fusion for Attenuation Correction and Anatomical  Localization:    3. Diagnostic CT scan of the chest, abdomen, and pelvis with  intravenous contrast for interpretation.  3. CT of the chest, abdomen and pelvis obtained for diagnostic  interpretation.  4. 3D MIP and PET-CT fused images were processed on an independent  workstation and archived to PACS and reviewed by a radiologist.     Technique:     1. PET: The patient received 12.16 mCi of F-18-FDG; the serum glucose  was 91 prior to administration, body weight was 86.6 kg. Images were  evaluated in the axial, sagittal, and coronal planes as well as the  rotational whole body MIP. Images were acquired from the Vertex to the  Feet.     UPTAKE WAS MEASURED AT 63 MINUTES.      BACKGROUND:  Liver SUV max= 4.24,   Aorta Blood SUV Max: 3.37.      2. CT: Volumetric acquisition for clinical interpretation of the  chest, abdomen, and pelvis acquired at 3 mm sections . The chest,  abdomen, and pelvis were evaluated at 5 mm sections in bone, soft  tissue, and lung windows.       The patient received 118 cc. Of Isovue 370 intravenously for the  examination.       3. 3D MIP and PET-CT fused images were processed on an independent  workstation and archived to PACS and reviewed by a radiologist.     INDICATION: COlon cancer; Malignant neoplasm of ascending colon (H)     ADDITIONAL INFORMATION OBTAINED FROM EMR: Patient with history of  colon cancer, status post right hemicolectomy     COMPARISON: CT chest 3/7/2018, outside CT abdomen 1/29/2018.     FINDINGS:      HEAD/NECK:  There is no  suspicious FDG uptake in the neck.      The paranasal  sinuses are clear. The mastoid air cells clear.      The mucosal pharyngeal space, the , prevertebral and carotid  spaces are within normal limits.      No masses, mass effect or pathologically enlarged lymph nodes are  evident. Unchanged non-FDG avid hypodense thyroid nodules.     Encephalomalacia in the right cerebellar hemisphere.     CHEST:  Mildly FDG avid nonenlarged bilateral hilar and mediastinal lymph  nodes. Examples include 1.1 cm left paratracheal node with max SUV of  3.8 and 2.1 cm right hilar node with max SUV of 4.4.      Subcentimeter pulmonary nodules, below the resolution of PET. Examples  include 4 mm right upper lobe nodule (series 9, image 75) and 5 mm  nodule in the right upper lobe (series 9, image 68).     There is no significant pericardial or plural effusions.     Coronary artery calcifications.     ABDOMEN AND PELVIS:  There is no suspicious FDG uptake in the abdomen or pelvis.     Post surgical changes of right hemicolectomy. There is nonspecific FDG  uptake in the colon.     There are no suspicious hepatic lesions. There is no splenomegaly or  evidence for splenic or pancreatic mass lesion.  There are no suspicious adrenal mass lesions or opaque gallbladder  calculi. There is symmetric nephrographic renal phase without  hydronephrosis. Bilateral renal cortical cysts.     There is no evidence for diverticulitis, bowel obstruction or free  fluid.       Prostatomegaly.     LOWER EXTREMITIES:   No abnormal masses or hypermetabolic lesions. Subchondral cyst in the  left hip joint.     BONES:   There are no suspicious lytic or blastic osseous lesions.  There is no  abnormal FDG uptake in the skeleton. Postsurgical changes of posterior  spinal fusion at L4-5.         IMPRESSION: In this patient with history of colon cancer, status post  right hemicolectomy; no evidence of metastatic disease.  1. Nonspecific diffuse FDG uptake throughout the residual colon  without associated CT  abnormality. This is likely related to  metformin.   2. Mildly FDG avid nonenlarged mediastinal and hilar lymph nodes,  favoring reactive.  3. Prostatomegaly.  4. Subcentimeter pulmonary nodules, below the resolution of PET.  Attention on follow-up.     ASSESSMENT AND PLAN:     1.  Patient with what I would call stage III colon cancer, [id number] disease.  (He has some nonspecific uptake on the PET-CT scan, but in absence of definitive corresponding CT abnormalities, I do not think that he has stage IV disease, and the lytic rib lesion has not let up.)  I think we would start treatment with a curative intent.  We had discussed Xeloda, and the patient and family were agreeable to it.  We reviewed the side effects again, including, but not limited to, fatigue, infectious complications, bleeding complications, mucositis, diarrhea, skin rash, concern for cardiac events, renal and liver dysfunction.  They want to proceed with therapy.  We reviewed it is 2 weeks on and 1 week off b.i.d. dosing.  He would return in the next 7 days for a toxicity check.  I will have our pharmacist sit down with him to review all his medications with medication interaction.     2.  Concern for QTc prolongation with Celexa.  The patient had an EKG in March.  He will return in 1 week, and at that time we would reevaluate his QTc to see if there is any impact with medication interaction.   3.  Decreased absorption with omeprazole.  I am switching him from omeprazole to ranitidine for management of his GERD, as omeprazole decreases the absorption of Xeloda.     I spent 35 minutes in the care of this patient >50% of which was spent in coordinating and counseling.      Marielle Davis   of Medicine   Hematology and medical Oncology   [facility]

## 2018-05-23 ENCOUNTER — CARE COORDINATION (OUTPATIENT)
Dept: ONCOLOGY | Facility: CLINIC | Age: 83
End: 2018-05-23

## 2018-05-23 NOTE — PROGRESS NOTES
Received call from Pranav's daughter Sherri.  They had set up Pranav's Xeloda and had things all ready.  Pranav had re-read the side effects and decided he dose not want to take the medication.  Sherri and her siblings support his decision.    I reinforced that we do too.  We would like to support Pranav how ever he would like.  Discussed with Sherri that Pranav could have Hospice care- but that if he does not want that at this time, it's ok.  We then discussed that he could RTC to see Dr. Davis to review any areas of concern and see how he is doing.    Sherri thinks Pranav would like to be seen by Dr. Davis again in the future.  We booked a RTC, no labs, on 6/11/18.    Notified [facility] Pharmacy team who will remove Pranav from their follow up list.

## 2018-09-12 ENCOUNTER — ONCOLOGY VISIT (OUTPATIENT)
Dept: ONCOLOGY | Facility: CLINIC | Age: 83
End: 2018-09-12
Attending: INTERNAL MEDICINE
Payer: MEDICARE

## 2018-09-12 VITALS
TEMPERATURE: 97 F | OXYGEN SATURATION: 95 % | HEART RATE: 64 BPM | HEIGHT: 67 IN | WEIGHT: 207.5 LBS | RESPIRATION RATE: 16 BRPM | DIASTOLIC BLOOD PRESSURE: 50 MMHG | BODY MASS INDEX: 32.57 KG/M2 | SYSTOLIC BLOOD PRESSURE: 129 MMHG

## 2018-09-12 DIAGNOSIS — C18.2 MALIGNANT NEOPLASM OF ASCENDING COLON (H): Primary | ICD-10-CM

## 2018-09-12 PROCEDURE — G0463 HOSPITAL OUTPT CLINIC VISIT: HCPCS | Mod: ZF

## 2018-09-12 PROCEDURE — 99214 OFFICE O/P EST MOD 30 MIN: CPT | Mod: ZP | Performed by: INTERNAL MEDICINE

## 2018-09-12 ASSESSMENT — PAIN SCALES - GENERAL: PAINLEVEL: NO PAIN (0)

## 2018-09-12 NOTE — LETTER
9/12/2018       RE: Pranav Jackson  [address]  [place] [address]     Dear Colleague,    Thank you for referring your patient, Pranav Jackson, to the [facility]. Please see a copy of my visit note below.    This is a followup visit for a diagnosis of colon cancer.  The patient returns today for followup.  Originally when he was seen, kindly refer to my note for that visit, he was seen several weeks ago for consideration of adjuvant chemotherapy for stage IIIB colon cancer, status post resection.  I made the recommendation of taking Xeloda as adjuvant therapy.  After reviewing he decided not to do any further therapy.  Today he is here with his family to discuss what would be the future course of action since he has done so well since our last visit.      INTERVAL HISTORY:  The patient states he is doing great.  He has gained about 10 pounds.  He is eating well, living independently, still staying at his own home, although his family provides a lot of support.  He has not noticed any change in his energy level.  Bowel and bladder function has been good.  He is denies any blood in his urine or stool.  No fevers, chills, coughing, breathing trouble, abdominal pain.  He does use a walker for ambulation but that is unchanged from his presentation.      MEDICAL/SURGICAL HISTORY:  Remains unchanged.      SOCIAL HISTORY:  Remains unchanged.      FAMILY HISTORY:  Remains unchanged.      MEDICATIONS:   Current Outpatient Prescriptions   Medication Sig Dispense Refill     acetaminophen (TYLENOL) 500 MG tablet Take 2 tablets (1,000 mg) by mouth 4 times daily As scheduled for the next 5-7 days, then decrease both dose and frequency to as needed. 50 tablet 0     aspirin 325 MG tablet Take 1 tablet (325 mg) by mouth daily 120 tablet      citalopram (CELEXA) 40 MG tablet Take 40 mg by mouth every morning        gabapentin (NEURONTIN) 300 MG capsule Take 300 mg by mouth every morning         "lisinopril (PRINIVIL/ZESTRIL) 20 MG tablet Take 1 tablet (20 mg) by mouth daily for 7 days (Patient taking differently: Take 12.5 mg by mouth daily ) 7 tablet 0     metFORMIN (GLUCOPHAGE) 1000 MG tablet Take 1,000 mg by mouth 2 times daily (with meals)        Multiple Vitamins-Minerals (MULTIVITAMIN ADULT PO) Take by mouth every morning        omeprazole (PRILOSEC) 20 MG CR capsule        simvastatin (ZOCOR) 20 MG tablet Take 20 mg by mouth At Bedtime        capecitabine (XELODA) 500 MG tablet CHEMO Take 4 tablets (2,000 mg) by mouth 2 times daily for 14 days Days 1 through 14, then off for 7 days. Take within 30 mins after meal. 112 tablet 0     Dulaglutide (TRULICITY SC) Inject 0.75 mg Subcutaneous once a week WED MORNING       Ferrous Sulfate (IRON SUPPLEMENT PO) Take 65 mg by mouth 2 times daily (with meals)        prochlorperazine (COMPAZINE) 10 MG tablet Take 0.5 tablets (5 mg) by mouth every 6 hours as needed (Nausea/Vomiting) (Patient not taking: Reported on 9/12/2018) 30 tablet 2     ranitidine (ZANTAC) 150 MG tablet Take 1 tablet (150 mg) by mouth 2 times daily (Patient not taking: Reported on 9/12/2018) 60 tablet 11          ALLERGIES:      Allergies   Allergen Reactions     Penicillins Hives     Tolerated PO cephalosporin 7/2016          PHYSICAL EXAMINATION:   VITAL SIGNS:  /50 (BP Location: Right arm, Patient Position: Chair, Cuff Size: Adult Large)  Pulse 64  Temp 97  F (36.1  C) (Oral)  Resp 16  Ht 1.702 m (5' 7.01\")  Wt 94.1 kg (207 lb 8 oz)  SpO2 95%  BMI 32.49 kg/m2    GENERAL:  Alert and oriented x3, in no apparent distress.   HEENT:  Moist mucous membranes.   CARDIOVASCULAR:  S1, S2.   EXTREMITIES:  Lower extremities with no pedal edema.  Moving all extremities appropriately.   PSYCHIATRIC:  Appropriate affect.     NEUROLOGIC:  Ambulating with a walker.      LABORATORY:  We did not do any lab evaluation.  His kidney function, liver function testing and CBC were not done today as " they were done on last visit.      IMAGING:  No interim imaging.      ASSESSMENT AND PLAN:  Patient with a history of stage IIIB colon cancer who chose not to pursue adjuvant chemotherapy returns today to discuss future course of action since he has been doing so well.  I had a detailed discussion with the patient.  Adjuvant chemotherapy is to prevent metastatic disease, and if the idea from the patient's standpoint is not to do any chemotherapy at any point in time, there is no utility in oncologic follow up.   One could consider following up with imaging, but again if he is not going to take any treatment, there is no point in doing followup imaging either.  At this point, he has other chronic comorbid conditions.  He should reestablish with his primary care work and work with him regarding his medications.  We had a long conversation about power of , goals of care and hospice, and as of right now, they are not requesting a hospice consultation, but they would establish care and potentially establish with hospice more for support than anything at this point.  The patient does not need to return to see me.     I spent 35 minutes in the care of this patient >50% of which was spent in coordinating and counseling.  Marielle Davis   of Medicine   Hematology and medical Oncology   [facility]

## 2018-09-12 NOTE — NURSING NOTE
"Oncology Rooming Note    September 12, 2018 9:43 AM   Pranav Jackson is a 83 year old male who presents for:    Chief Complaint   Patient presents with     Oncology Clinic Visit     [facility] RETURN- COLON CA     Initial Vitals: /50 (BP Location: Right arm, Patient Position: Chair, Cuff Size: Adult Large)  Pulse 64  Temp 97  F (36.1  C) (Oral)  Resp 16  Ht 1.702 m (5' 7.01\")  Wt 94.1 kg (207 lb 8 oz)  SpO2 95%  BMI 32.49 kg/m2 Estimated body mass index is 32.49 kg/(m^2) as calculated from the following:    Height as of this encounter: 1.702 m (5' 7.01\").    Weight as of this encounter: 94.1 kg (207 lb 8 oz). Body surface area is 2.11 meters squared.  No Pain (0) Comment: Data Unavailable   No LMP for male patient.  Allergies reviewed: Yes  Medications reviewed: Yes    Medications: MEDICATION REFILLS NEEDED TODAY. Provider was notified.  Pharmacy name entered into [employer]: [id number]    Clinical concerns: Talk to you about writing new prescriptions because patient's primary left. Susan was notified.    10 minutes for nursing intake (face to face time)     Albert Hale LPN            "

## 2018-09-12 NOTE — MR AVS SNAPSHOT
"              After Visit Summary   9/12/2018    Pranav Jackson    MRN: [id number]           Patient Information     Date Of Birth          11/28/1934        Visit Information        Provider Department      9/12/2018 9:45 AM Marielle Davis MD [facility]         Follow-ups after your visit        Your next 10 appointments already scheduled     Sep 12, 2018  9:45 AM CDT   (Arrive by 9:30 AM)   Return Visit with Marielle Davis MD   [facility] ([facility])    [address] [place] [address]  [address] [place] [id number] [phone]              Who to contact     If you have questions or need follow up information about today's clinic visit or your schedule please contact [facility] directly at [phone].  Normal or non-critical lab and imaging results will be communicated to you by MyChart, letter or phone within 4 business days after the clinic has received the results. If you do not hear from us within 7 days, please contact the clinic through MyChart or phone. If you have a critical or abnormal lab result, we will notify you by phone as soon as possible.  Submit refill requests through [employer] or call your pharmacy and they will forward the refill request to us. Please allow 3 business days for your refill to be completed.          Additional Information About Your Visit        MyChart Information     rag [employer] bone lets you send messages to your doctor, view your test results, renew your prescriptions, schedule appointments and more. To sign up, go to [email or web address] . Click on \"Log in\" on the left side of the screen, which will take you to the Welcome page. Then click on \"Sign up Now\" on the right side of the page.     You will be asked to enter the access code listed below, as well as some personal information. Please follow the directions to create your username and password.     Your access code is: [email or web address]  Expires: " 2018 10:08 AM     Your access code will  in 90 days. If you need help or a new code, please call your [facility] or [phone].        Care EveryWhere ID     This is your Care EveryWhere ID. This could be used by other organizations to access your [place] medical records  [id number]         Blood Pressure from Last 3 Encounters:   18 149/62   18 146/67   04/10/18 129/57    Weight from Last 3 Encounters:   18 87.5 kg (193 lb)   18 86.6 kg (191 lb)   04/10/18 85.8 kg (189 lb 3.2 oz)              Today, you had the following     No orders found for display       Primary Care Provider Office Phone # Fax #    Isacc Mcgrath [phone]       [facility] [address]  [facility] [address]        Equal Access to Services     SAGE ROBLEDO : Hadii aad ku hadasho Soomaali, waaxda luqadaha, qaybta kaalmada adeegyada, waxay imeldain haydustinn madi schofield . So [facility] [phone].    ATENCIÓN: Si davisla espfidel, tiene a stoner disposición servicios gratuitos de asistencia lingüística. Llame al [phone].    We comply with applicable federal civil rights laws and [place] laws. We do not discriminate on the basis of race, color, national origin, age, disability, sex, sexual orientation, or gender identity.            Thank you!     Thank you for choosing [facility]  for your care. Our goal is always to provide you with excellent care. Hearing back from our patients is one way we can continue to improve our services. Please take a few minutes to complete the written survey that you may receive in the mail after your visit with us. Thank you!             Your Updated Medication List - Protect others around you: Learn how to safely use, store and throw away your medicines at www.disposemymeds.org.          This list is accurate as of 8/3/18  2:41 PM.  Always use your most recent med list.                   Brand Name Dispense Instructions for  use Diagnosis    acetaminophen 500 MG tablet    TYLENOL    50 tablet    Take 2 tablets (1,000 mg) by mouth 4 times daily As scheduled for the next 5-7 days, then decrease both dose and frequency to as needed.    S/P right hemicolectomy       aspirin 325 MG tablet     120 tablet    Take 1 tablet (325 mg) by mouth daily        capecitabine 500 MG tablet CHEMO    XELODA    112 tablet    Take 4 tablets (2,000 mg) by mouth 2 times daily for 14 days Days 1 through 14, then off for 7 days. Take within 30 mins after meal.        citalopram 40 MG tablet    celeXA     Take 40 mg by mouth every morning        gabapentin 300 MG capsule    NEURONTIN     Take 300 mg by mouth every morning        IRON SUPPLEMENT PO      Take 65 mg by mouth 2 times daily (with meals)        lisinopril 20 MG tablet    PRINIVIL/ZESTRIL    7 tablet    Take 1 tablet (20 mg) by mouth daily for 7 days    Acute kidney injury (H), Dehydration, Diarrhea, unspecified type, Hypertension, unspecified type       metFORMIN 1000 MG tablet    GLUCOPHAGE     Take 1,000 mg by mouth 2 times daily (with meals)        MULTIVITAMIN ADULT PO      Take by mouth every morning        prochlorperazine 10 MG tablet    COMPAZINE    30 tablet    Take 0.5 tablets (5 mg) by mouth every 6 hours as needed (Nausea/Vomiting)        ranitidine 150 MG tablet    ZANTAC    60 tablet    Take 1 tablet (150 mg) by mouth 2 times daily    Gastroesophageal reflux disease without esophagitis       simvastatin 20 MG tablet    ZOCOR     Take 20 mg by mouth At Bedtime        TRULICITY SC      Inject 0.75 mg Subcutaneous once a week WED MORNING

## 2018-09-16 NOTE — PROGRESS NOTES
This is a followup visit for a diagnosis of colon cancer.  The patient returns today for followup.  Originally when he was seen, kindly refer to my note for that visit, he was seen several weeks ago for consideration of adjuvant chemotherapy for stage IIIB colon cancer, status post resection.  I made the recommendation of taking Xeloda as adjuvant therapy.  After reviewing he decided not to do any further therapy.  Today he is here with his family to discuss what would be the future course of action since he has done so well since our last visit.      INTERVAL HISTORY:  The patient states he is doing great.  He has gained about 10 pounds.  He is eating well, living independently, still staying at his own home, although his family provides a lot of support.  He has not noticed any change in his energy level.  Bowel and bladder function has been good.  He is denies any blood in his urine or stool.  No fevers, chills, coughing, breathing trouble, abdominal pain.  He does use a walker for ambulation but that is unchanged from his presentation.      MEDICAL/SURGICAL HISTORY:  Remains unchanged.      SOCIAL HISTORY:  Remains unchanged.      FAMILY HISTORY:  Remains unchanged.      MEDICATIONS:   Current Outpatient Prescriptions   Medication Sig Dispense Refill     acetaminophen (TYLENOL) 500 MG tablet Take 2 tablets (1,000 mg) by mouth 4 times daily As scheduled for the next 5-7 days, then decrease both dose and frequency to as needed. 50 tablet 0     aspirin 325 MG tablet Take 1 tablet (325 mg) by mouth daily 120 tablet      citalopram (CELEXA) 40 MG tablet Take 40 mg by mouth every morning        gabapentin (NEURONTIN) 300 MG capsule Take 300 mg by mouth every morning        lisinopril (PRINIVIL/ZESTRIL) 20 MG tablet Take 1 tablet (20 mg) by mouth daily for 7 days (Patient taking differently: Take 12.5 mg by mouth daily ) 7 tablet 0     metFORMIN (GLUCOPHAGE) 1000 MG tablet Take 1,000 mg by mouth 2 times daily (with  "meals)        Multiple Vitamins-Minerals (MULTIVITAMIN ADULT PO) Take by mouth every morning        omeprazole (PRILOSEC) 20 MG CR capsule        simvastatin (ZOCOR) 20 MG tablet Take 20 mg by mouth At Bedtime        capecitabine (XELODA) 500 MG tablet CHEMO Take 4 tablets (2,000 mg) by mouth 2 times daily for 14 days Days 1 through 14, then off for 7 days. Take within 30 mins after meal. 112 tablet 0     Dulaglutide (TRULICITY SC) Inject 0.75 mg Subcutaneous once a week WED MORNING       Ferrous Sulfate (IRON SUPPLEMENT PO) Take 65 mg by mouth 2 times daily (with meals)        prochlorperazine (COMPAZINE) 10 MG tablet Take 0.5 tablets (5 mg) by mouth every 6 hours as needed (Nausea/Vomiting) (Patient not taking: Reported on 9/12/2018) 30 tablet 2     ranitidine (ZANTAC) 150 MG tablet Take 1 tablet (150 mg) by mouth 2 times daily (Patient not taking: Reported on 9/12/2018) 60 tablet 11          ALLERGIES:      Allergies   Allergen Reactions     Penicillins Hives     Tolerated PO cephalosporin 7/2016          PHYSICAL EXAMINATION:   VITAL SIGNS:  /50 (BP Location: Right arm, Patient Position: Chair, Cuff Size: Adult Large)  Pulse 64  Temp 97  F (36.1  C) (Oral)  Resp 16  Ht 1.702 m (5' 7.01\")  Wt 94.1 kg (207 lb 8 oz)  SpO2 95%  BMI 32.49 kg/m2    GENERAL:  Alert and oriented x3, in no apparent distress.   HEENT:  Moist mucous membranes.   CARDIOVASCULAR:  S1, S2.   EXTREMITIES:  Lower extremities with no pedal edema.  Moving all extremities appropriately.   PSYCHIATRIC:  Appropriate affect.     NEUROLOGIC:  Ambulating with a walker.      LABORATORY:  We did not do any lab evaluation.  His kidney function, liver function testing and CBC were not done today as they were done on last visit.      IMAGING:  No interim imaging.      ASSESSMENT AND PLAN:  Patient with a history of stage IIIB colon cancer who chose not to pursue adjuvant chemotherapy returns today to discuss future course of action since he has " been doing so well.  I had a detailed discussion with the patient.  Adjuvant chemotherapy is to prevent metastatic disease, and if the idea from the patient's standpoint is not to do any chemotherapy at any point in time, there is no utility in oncologic follow up.   One could consider following up with imaging, but again if he is not going to take any treatment, there is no point in doing followup imaging either.  At this point, he has other chronic comorbid conditions.  He should reestablish with his primary care work and work with him regarding his medications.  We had a long conversation about power of , goals of care and hospice, and as of right now, they are not requesting a hospice consultation, but they would establish care and potentially establish with hospice more for support than anything at this point.  The patient does not need to return to see me.     I spent 35 minutes in the care of this patient >50% of which was spent in coordinating and counseling.  Marielle Davis   of Medicine   Hematology and medical Oncology   [facility]

## 2021-05-26 ENCOUNTER — RECORDS - HEALTHEAST (OUTPATIENT)
Dept: ADMINISTRATIVE | Facility: CLINIC | Age: 86
End: 2021-05-26

## 2021-05-29 ENCOUNTER — RECORDS - HEALTHEAST (OUTPATIENT)
Dept: ADMINISTRATIVE | Facility: CLINIC | Age: 86
End: 2021-05-29

## 2022-11-07 NOTE — TELEPHONE ENCOUNTER
Patient's daughter called this evening.  Patient vomited tonight and is having more burping.  He had large BM last night and smaller one this morning.  He is still passing gas.  No other concerns.      I advised that the patient move to a clear liquid diet for tonight and see if his symptoms improve and if not, they can call the clinic in the morning.  I told them that if his vomiting continues or has increasing pain or bloating, or stops having bowel function that he needs to come to the ED.      Nitin Segundo  General Surgery PGY-3  Pager [phone]     Rhofade Counseling: Rhofade is a topical medication which can decrease superficial blood flow where applied. Side effects are uncommon and include stinging, redness and allergic reactions.

## 2023-12-13 NOTE — BRIEF OP NOTE
[facility] Brief Operative Note    Pre-operative diagnosis: Malignant Neoplasm Of Ascending Colon    Post-operative diagnosis Malignant Neoplasm Of Ascending Colon    Procedure: Procedure(s):  Laparoscopic Right Hemicolectomy, Extensive Lysis of Adhesions,, Anesthesia Block (ERAS Patient) - Wound Class: II-Clean Contaminated   Surgeon(s): Surgeon(s) and Role:     * Brandin Ibarra MD - Primary     * Luc Balderas MD - Resident - Assisting     * Rohit Delgado MD - Resident - Assisting   Estimated blood loss: 5 mL    Specimens:   ID Type Source Tests Collected by Time Destination   1 : Blood for study Blood, venous Blood OR DOCUMENTATION ONLY Brandin Ibarra MD 3/21/2018  9:18 AM    2 : Urine for study Urine Urine catheter OR DOCUMENTATION ONLY Brandin Ibarra MD 3/21/2018  9:19 AM    A : Right Hemicolectomy Tissue Large Intestine, Right/Ascending SURGICAL PATHOLOGY EXAM Brandin Ibarra MD 3/21/2018 10:43 AM       Findings: Omental adhesions to prior midline requiring extensive laparoscopic lysis of adhesions, no evidence of peritoneal/metastatic disease; ileocolic pedicle divided with vascular staple load.      E-scribe request for metformin and allopurinol. Please review and e-scribe if applicable.      Last Visit Date:  12/8/23  Next Visit Date:  12/26/2023    Hemoglobin A1C (%)   Date Value   12/08/2023 6.2   08/01/2023 6.5   04/13/2023 6.6             ( goal A1C is < 7)   No components found for: \"LABMICR\"  LDL Cholesterol (mg/dL)   Date Value   07/31/2023 66       (goal LDL is <100)   AST (U/L)   Date Value   04/20/2022 15     ALT (U/L)   Date Value   04/20/2022 13     BUN (mg/dL)   Date Value   12/08/2023 18     BP Readings from Last 3 Encounters:   12/08/23 137/84   08/01/23 118/73   06/02/23 126/78          (goal 120/80)        Patient Active Problem List:     Essential hypertension     Chronic back pain     Morbid obesity with BMI of 50.0-59.9, adult ([facility])     ZURDO (obstructive sleep apnea)     Gout     CKD (chronic kidney disease)     Mixed hyperlipidemia     Squamous cell carcinoma of face     Type 2 diabetes mellitus without complication, without long-term current use of insulin (HCC)     Need for prophylactic vaccination and inoculation against varicella     Vitamin D deficiency     BMI 50.0-59.9, adult ([address])     Type 2 diabetes mellitus with hyperglycemia     Diabetes mellitus ([address])     Back spasm     Secondary hypertension     Type 2 diabetes mellitus with chronic kidney disease     Body mass index (BMI) 45.0-49.9, adult (HCC)     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

## (undated) DEVICE — ENDO SCOPE WARMER SEAL  C3101

## (undated) DEVICE — SU VICRYL 3-0 SH 27" J316H

## (undated) DEVICE — SOL WATER IRRIG 1000ML BOTTLE 2F7114

## (undated) DEVICE — ENDO CANNULA 05MM VERSAONE UNIVERSAL UNVCA5STF

## (undated) DEVICE — ENDO TROCAR OPTICAL 05MM VERSAPORT PLUS W/FIX CAN ONB5STF

## (undated) DEVICE — STPL ENDO HANDLE GIA ULTRA UNIVERSAL STD EGIAUSTND

## (undated) DEVICE — LINEN TOWEL PACK X6 WHITE 5487

## (undated) DEVICE — GLOVE PROTEXIS BLUE W/NEU-THERA 7.5  2D73EB75

## (undated) DEVICE — SUCTION TIP YANKAUER STR K87

## (undated) DEVICE — WIPES FOLEY CARE SURESTEP PROVON DFC100

## (undated) DEVICE — SU PDS II 0 CTX 36" Z370T

## (undated) DEVICE — PREP CHLORAPREP 26ML TINTED ORANGE  260815

## (undated) DEVICE — CATH TRAY FOLEY SURESTEP 16FR W/URNE MTR STLK LATEX A303316A

## (undated) DEVICE — STPL ENDO RELOAD 30X2.5MM ROTIC 030451

## (undated) DEVICE — SU MONOCRYL 4-0 PS-2 27" UND Y426H

## (undated) DEVICE — DRAPE LEGGINGS CLEAR 8430

## (undated) DEVICE — ENDO TROCAR 12MM VERSAONE BLADELESS W/STD FIX CAN NONB12STF

## (undated) DEVICE — ENDO TROCAR BLUNT TIP KII BALLOON 12X100MM C0R47

## (undated) DEVICE — LINEN TOWEL PACK X30 5481

## (undated) DEVICE — TUBING INSUFFLATION W/FILTER CPC TO LUER 620-030-301

## (undated) DEVICE — DRAPE IOBAN INCISE 23X17" 6650EZ

## (undated) DEVICE — Device

## (undated) DEVICE — SU VICRYL 0 UR-6 27" J603H

## (undated) DEVICE — DEVICE SUTURE GRASPER TROCAR CLOSURE 14GA PMITCSG

## (undated) DEVICE — ESU LIGASURE LAPAROSCOPIC BLUNT TIP SEALER 5MMX37CM LF1837

## (undated) DEVICE — SU VICRYL 3-0 SH 27" UND J416H

## (undated) DEVICE — ESU GROUND PAD ADULT W/CORD E7507

## (undated) DEVICE — NDL INSUFFLATION 120MM VERRES 172015

## (undated) DEVICE — KIT PATIENT POSITIONING PIGAZZI LATEX FREE 40580

## (undated) DEVICE — GLOVE PROTEXIS MICRO 7.0  2D73PM70

## (undated) DEVICE — SU DERMABOND ADVANCED .7ML DNX12

## (undated) DEVICE — BLADE CLIPPER SGL USE 9680

## (undated) DEVICE — ANTIFOG SOLUTION W/FOAM PAD 31142527

## (undated) DEVICE — SU VICRYL 0 TIE 54" J608H

## (undated) DEVICE — ENDO TROCAR 05MM VERSAONE BLADELESS W/STD FIX CAN NONB5STF

## (undated) DEVICE — STPL 80 X 3.8MM GIA8038S

## (undated) DEVICE — STPL LINEAR 90MM TL90

## (undated) DEVICE — SU VICRYL 2-0 TIE 12X18" J905T

## (undated) RX ORDER — ASPIRIN 81 MG/1
TABLET ORAL
Status: DISPENSED
Start: 2018-03-21

## (undated) RX ORDER — FENTANYL CITRATE 50 UG/ML
INJECTION, SOLUTION INTRAMUSCULAR; INTRAVENOUS
Status: DISPENSED
Start: 2018-03-21

## (undated) RX ORDER — HYDRALAZINE HYDROCHLORIDE 20 MG/ML
INJECTION INTRAMUSCULAR; INTRAVENOUS
Status: DISPENSED
Start: 2018-03-21

## (undated) RX ORDER — LABETALOL HYDROCHLORIDE 5 MG/ML
INJECTION, SOLUTION INTRAVENOUS
Status: DISPENSED
Start: 2018-03-21

## (undated) RX ORDER — PHENYLEPHRINE HCL IN 0.9% NACL 1 MG/10 ML
SYRINGE (ML) INTRAVENOUS
Status: DISPENSED
Start: 2018-03-21

## (undated) RX ORDER — GLYCOPYRROLATE 0.2 MG/ML
INJECTION, SOLUTION INTRAMUSCULAR; INTRAVENOUS
Status: DISPENSED
Start: 2018-03-21

## (undated) RX ORDER — HYDROMORPHONE HCL/0.9% NACL/PF 0.2MG/0.2
SYRINGE (ML) INTRAVENOUS
Status: DISPENSED
Start: 2018-03-21

## (undated) RX ORDER — SODIUM CHLORIDE, SODIUM LACTATE, POTASSIUM CHLORIDE, CALCIUM CHLORIDE 600; 310; 30; 20 MG/100ML; MG/100ML; MG/100ML; MG/100ML
INJECTION, SOLUTION INTRAVENOUS
Status: DISPENSED
Start: 2018-03-21

## (undated) RX ORDER — PROPOFOL 10 MG/ML
INJECTION, EMULSION INTRAVENOUS
Status: DISPENSED
Start: 2018-03-21

## (undated) RX ORDER — ONDANSETRON 2 MG/ML
INJECTION INTRAMUSCULAR; INTRAVENOUS
Status: DISPENSED
Start: 2018-03-21

## (undated) RX ORDER — LIDOCAINE HYDROCHLORIDE 20 MG/ML
INJECTION, SOLUTION EPIDURAL; INFILTRATION; INTRACAUDAL; PERINEURAL
Status: DISPENSED
Start: 2018-03-21

## (undated) RX ORDER — ACETAMINOPHEN 325 MG/1
TABLET ORAL
Status: DISPENSED
Start: 2018-03-21

## (undated) RX ORDER — ESMOLOL HYDROCHLORIDE 10 MG/ML
INJECTION INTRAVENOUS
Status: DISPENSED
Start: 2018-03-21

## (undated) RX ORDER — EPHEDRINE SULFATE 50 MG/ML
INJECTION, SOLUTION INTRAMUSCULAR; INTRAVENOUS; SUBCUTANEOUS
Status: DISPENSED
Start: 2018-03-21